# Patient Record
Sex: FEMALE | Race: WHITE | URBAN - METROPOLITAN AREA
[De-identification: names, ages, dates, MRNs, and addresses within clinical notes are randomized per-mention and may not be internally consistent; named-entity substitution may affect disease eponyms.]

---

## 2017-02-01 ENCOUNTER — NURSING HOME VISIT (OUTPATIENT)
Dept: GERIATRICS | Facility: CLINIC | Age: 82
End: 2017-02-01
Payer: COMMERCIAL

## 2017-02-01 VITALS
HEART RATE: 60 BPM | DIASTOLIC BLOOD PRESSURE: 74 MMHG | TEMPERATURE: 98 F | WEIGHT: 209 LBS | SYSTOLIC BLOOD PRESSURE: 118 MMHG | BODY MASS INDEX: 33.75 KG/M2 | RESPIRATION RATE: 18 BRPM

## 2017-02-01 DIAGNOSIS — E89.0 POSTSURGICAL HYPOTHYROIDISM: ICD-10-CM

## 2017-02-01 DIAGNOSIS — M54.42 CHRONIC LOW BACK PAIN WITH BILATERAL SCIATICA, UNSPECIFIED BACK PAIN LATERALITY: ICD-10-CM

## 2017-02-01 DIAGNOSIS — G89.29 CHRONIC LOW BACK PAIN WITH BILATERAL SCIATICA, UNSPECIFIED BACK PAIN LATERALITY: ICD-10-CM

## 2017-02-01 DIAGNOSIS — G62.9 PERIPHERAL POLYNEUROPATHY: Primary | Chronic | ICD-10-CM

## 2017-02-01 DIAGNOSIS — E11.9 TYPE 2 DIABETES MELLITUS WITHOUT COMPLICATION, WITHOUT LONG-TERM CURRENT USE OF INSULIN (H): ICD-10-CM

## 2017-02-01 DIAGNOSIS — N39.0 FREQUENT UTI: ICD-10-CM

## 2017-02-01 DIAGNOSIS — M54.41 CHRONIC LOW BACK PAIN WITH BILATERAL SCIATICA, UNSPECIFIED BACK PAIN LATERALITY: ICD-10-CM

## 2017-02-01 PROCEDURE — 99309 SBSQ NF CARE MODERATE MDM 30: CPT | Performed by: NURSE PRACTITIONER

## 2017-02-01 PROCEDURE — 99207 ZZC CDG-CORRECTLY CODED, REVIEWED AND AGREE: CPT | Performed by: NURSE PRACTITIONER

## 2017-02-01 NOTE — PROGRESS NOTES
[place] [facility]    Chief Complaint   Patient presents with     RECHECK     HPI:    Mireille Taylor is a 87 year old  (8/2/1929), who is being seen today for an episodic care visit at [facility]. Today's concern is:  Peripheral polyneuropathy (H)  Continues with chronic back and bilateral leg pain.  This has affected feeling in both lower extremities.  No recent changes.    Chronic low back pain with bilateral sciatica, unspecified back pain laterality  No recent changes to scheduled pain medications.  Took prn oxycodone 1-2 times per day in January.  Implanted stimulator remains in place.     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  TSH in August was 0.88.  No recent changes to levothyroxine dose.    Type 2 diabetes mellitus without complication, without long-term current use of insulin (H)  Fasting accuchecks in January: 124-139.  Remains on Metformin.    Frequent UTI, Dr Bigg Muhammad, Urologist  Remains on premarin cream twice a week, myrbetriq and hiprex. No UTI for 6 months.      ALLERGIES: Bactrim; Gabapentin; Lyrica; Methadone; Nsaids; Penicillin g; Tramadol; Cephalexin hcl; Clindamycin hcl; and Macrobid  Past Medical, Surgical, Family and Social History reviewed and updated in [facility].    Current Outpatient Prescriptions   Medication Sig Dispense Refill     DiazePAM (VALIUM PO) Take 2.5 mg by mouth At Bedtime       Multiple Vitamins-Minerals (PRESERVISION AREDS 2 PO) Take 1 tablet by mouth 2 times daily       Pseudoephedrine-DM-GG (ROBITUSSIN CF PO) Take 5 mLs by mouth as needed       Acetaminophen (TYLENOL PO) Take 650 mg by mouth 4 times daily       conjugated estrogens (PREMARIN) vaginal cream Place 0.5 g vaginally twice a week 30 g 12     polyethylene glycol (MIRALAX/GLYCOLAX) powder Take 17 g by mouth daily as needed for constipation 119 g      oxyCODONE (ROXICODONE) 5 MG immediate release tablet 2.5 mg am and HS and q6h prn pain.  Check with pt  before administering morning dose if she has had a prn overnight to see if she would like to hold the morning dose.  0     metFORMIN (GLUCOPHAGE) 500 MG tablet Take 1 tablet (500 mg) by mouth 2 times daily (with meals) 180 tablet 1     pantoprazole (PROTONIX) 40 MG enteric coated tablet Take 1 tablet (40 mg) by mouth daily Take 30-60 minutes before a meal. 180 tablet 3     levothyroxine (SYNTHROID, LEVOTHROID) 125 MCG tablet Take 1 tablet (125 mcg) by mouth daily 90 tablet 1     sodium chloride ([employer] SALINE NASAL SPRAY) 0.65 % nasal spray Spray 2 sprays into both nostrils 2 times daily       aspirin 81 MG EC tablet Take 1 tablet (81 mg) by mouth daily 90 tablet 3     Ascorbic Acid 1000 MG TABS Take 1,000 mg by mouth daily       furosemide (LASIX) 20 MG tablet Take 1 tablet (20 mg) by mouth daily 90 tablet 4     citalopram (CELEXA) 40 MG tablet TAKE 1 TABLET BY MOUTH DAILY 90 tablet 1     potassium chloride SA (K-DUR,KLOR-CON M) 10 MEQ tablet TAKE ONE TABLET BY MOUTH DAILY 90 tablet 1     methenamine hippurate (HIPREX) 1 G TABS TAKE 1 TABLET BY MOUTH ONCE A DAY.  TAKE ALONG WITH 1000MG VITAMIN-C 180 tablet 4     mirabegron (MYRBETRIQ) 50 MG 24 hr tablet Take 1 tablet (50 mg) by mouth daily 30 tablet 11     Medications reviewed:  Medications reconciled to facility chart and changes were made to reflect current medications as identified as above med list. Below are the changes that were made:   Medications stopped since last EPIC medication reconciliation:   There are no discontinued medications.    Medications started since last [facility] medication reconciliation:  No orders of the defined types were placed in this encounter.     Patient Active Problem List   Diagnosis     GERD (gastroesophageal reflux disease)     Systolic murmur     CARDIOVASCULAR SCREENING; LDL GOAL LESS THAN 100     JAMES on CPAP     Osteoporosis     BMI 30-35     Peripheral neuropathy (H)     Anxiety     Vitamin D deficiency     Mixed incontinence urge  and stress     Health Care Home     Frequent UTI, Dr Bigg Muhammad, Urologist     Candidiasis of skin     Dependent edema     Benzodiazepine dependence, pt reports daily use of valium since the 1930's.     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     Chronic low back pain with bilateral sciatica     Macular degeneration (senile) of retina     Recurrent major depressive disorder, in partial remission (H)       REVIEW OF SYSTEMS:  Negative selected, CONSTITUTIONAL: weight loss, weight gain, poor appetite and fevers, EYES: tearing, redness and Positive for eyeglasses and macular degeneration.  Followed by Dr. Carlson ENT: [place], dysphagia and positive for h/o epistaxis,  CV: chest pain and Positive for dependent edema., RESPIRATORY: shortness of breath, cough and wheezing, : Positive for frequent UTI's and dribble incontinence. Has implanted stimulator for bladder control...SEE HPI. GI: N&V.  Positive for occasional loose stools and constipation.  See HPI for rectal bleeding.. NEURO: headaches and Positive for variable memory issues - son reports it is dependent on how much valium and oxycodone she has taken & pt agrees.. , and MUSCULOSKELETAL: Positive for chronic back pain.    Physical Exam:  /74 mmHg  Pulse 60  Temp(Src) 98  F (36.7  C)  Resp 18  Wt 209 lb (94.802 kg)  GENERAL APPEARANCE: Alert, overweight female seen.  Sitting up at side of bed.  Well groomed. .  Able to  express self verbally. No obvious acute distress.  HEAD:  Normocephalic.  No facial asymmetry..  ENT: Mouth and posterior oropharynx normal, moist mucous membranes, .  EYES: EOM normal, conjunctiva and lids normal  NECK  Thick neck, trachea midline. No swallow difficulties.  RESP: Quiet, effortless respiration.  No cough.  Diminished lung sounds bilateral lower lobes, otherwise CTA bilaterally.  Old surgical scar from  thyroidectomy visible at base of neck and upper chest..  CV: regular rate and rhythm, 2/6 systolic murmur. Trace/+1 ankle and pedal edema.  ABDOMEN: Large, rounded abdomen. BS's positive all 4 quadrants. Slight discomfort with palpation of right periumbilical area.  PSYCH: oriented X 3, Mood is somewhat flat per baseline, but overall positive.     Recent Labs:    CBC RESULTS:   Recent Labs   Lab Test 12/05/16 09/27/16 09/21/13   1455  06/01/11   1845   WBC  5.2  5.8   < >  10.9   --    RBC  4.23  4.3   < >  4.77   --    HGB  13.1  13.3   < >  14.9  15.0   HCT  40.6  41.2   < >  43.1  49.2   MCV  96.0  95.8   < >  90  94.1   MCH   --    --    --   31.2  28.7   MCHC   --    --    --   34.6  30.5*   RDW  13.3  13.0   < >  12.7  13.7   PLT  175  179   < >  128*   --     < > = values in this interval not displayed.       Last Basic Metabolic Panel:  Recent Labs   Lab Test 12/05/16 09/27/16   NA  142  139   POTASSIUM  4.3  3.8   CHLORIDE  103  100   KURTIS  10.0  10.1   CO2  30  31   BUN  11  <10   CR  0.62  0.72   GLC  110*  203*     GFR ESTIMATE   Date Value Ref Range Status   12/05/2016 >60 ml/min/1.73m2 Final   09/27/2016 >60 >60 ml/min/1.73m2 Final   08/25/2016 >60 ml/min/1.73m2 Final   06/09/2016 >60 ml/min/1.73m2 Final   03/17/2016 >60 ml/min/1.73m2 Final     TSH   Date Value Ref Range Status   08/24/2016 0.88 0.20 - 4.50 mcU/mL Final   ]  A1C      5.7   8/24/2016  A1C      5.8   4/14/2016  A1C      5.9   12/17/2015  A1C      6.2   6/19/2015  A1C      6.4   1/13/2015      Assessment/Plan:  (G62.9) Peripheral polyneuropathy (H)  (primary encounter diagnosis)  Comment: Chronic  Plan: Continue current POC.  Dr. Leon to see on Monday for routine eval.    (M54.41,  G89.29,  M54.42) Chronic low back pain with bilateral sciatica, unspecified back pain laterality  Comment: Chronic, but she feels current medications are adequate for pain control  Plan: Continue current POC    (E89.0) Postsurgical hypothyroidism, Surgical  excision in her 20's.  Comment: Chronic  Plan: Continue current dose of levothyroxine and check TSH tomorrow due to the low trend of TSH 6 months ago.    (E11.9) Type 2 diabetes mellitus without complication, without long-term current use of insulin (H)  Comment: Chronic, HgbA1C goal of <8%. At goal  Plan: Continue current POC.  Check HgbA1C, Hgb and BMP in am.    (N39.0) Frequent UTI, Dr Bigg Muhammad, Urologist  Comment: Chronic, but with recent good control  Plan: Continue current POC.    Electronically signed by  SABINE Borjas CNP

## 2017-02-02 ENCOUNTER — TRANSFERRED RECORDS (OUTPATIENT)
Dept: HEALTH INFORMATION MANAGEMENT | Facility: CLINIC | Age: 82
End: 2017-02-02

## 2017-02-02 LAB
BUN SERPL-MCNC: 11 MG/DL (ref 9–26)
CALCIUM SERPL-MCNC: 9.7 MG/DL (ref 8.4–10.2)
CHLORIDE SERPLBLD-SCNC: 104 MMOL/L (ref 98–109)
CHLORIDE SERPLBLD-SCNC: 104 MMOL/L (ref 98–109)
CO2 SERPL-SCNC: 31 MMOL/L (ref 22–31)
CREAT SERPL-MCNC: 0.81 MG/DL (ref 0.55–1.02)
GFR SERPL CREATININE-BSD FRML MDRD: >60 ML/MIN/1.73M2
GLUCOSE SERPL-MCNC: 118 MG/DL (ref 70–100)
HBA1C MFR BLD: 5.8 % (ref 4–5.6)
HEMOGLOBIN: 13.1 G/DL (ref 11.8–15.5)
POTASSIUM SERPL-SCNC: 4.5 MMOL/L (ref 3.5–5.2)
POTASSIUM SERPL-SCNC: 4.5 MMOL/L (ref 3.5–5.2)
SODIUM SERPL-SCNC: 142 MMOL/L (ref 136–145)
SODIUM SERPL-SCNC: 142 MMOL/L (ref 136–145)
TSH SERPL-ACNC: 0.6 MCU/ML (ref 0.2–4.5)
TSH SERPL-ACNC: 0.6 ULU/ML (ref 0.2–4.5)

## 2017-02-20 ENCOUNTER — NURSING HOME VISIT (OUTPATIENT)
Dept: GERIATRICS | Facility: CLINIC | Age: 82
End: 2017-02-20

## 2017-02-20 DIAGNOSIS — G60.9 IDIOPATHIC PERIPHERAL NEUROPATHY: ICD-10-CM

## 2017-02-20 DIAGNOSIS — M54.41 CHRONIC LOW BACK PAIN WITH BILATERAL SCIATICA, UNSPECIFIED BACK PAIN LATERALITY: Primary | ICD-10-CM

## 2017-02-20 DIAGNOSIS — E11.9 TYPE 2 DIABETES MELLITUS WITHOUT COMPLICATION, WITHOUT LONG-TERM CURRENT USE OF INSULIN (H): ICD-10-CM

## 2017-02-20 DIAGNOSIS — G89.29 CHRONIC LOW BACK PAIN WITH BILATERAL SCIATICA, UNSPECIFIED BACK PAIN LATERALITY: Primary | ICD-10-CM

## 2017-02-20 DIAGNOSIS — M54.42 CHRONIC LOW BACK PAIN WITH BILATERAL SCIATICA, UNSPECIFIED BACK PAIN LATERALITY: Primary | ICD-10-CM

## 2017-02-21 NOTE — PROGRESS NOTES
Pt seen for a regulatory visit  Case reviewed with NP     Pt denies acute physical concerns     She would like to have oxycodone scheduled earlier in am, for low back and LE pain     She denies chest pain, SOB     VSS  In good spirits, appears well  Lungs clear  CV rrr  Abd soft  No LE edema     Assessment     Chronic LBP, stable  Peripheral neuropathy, stable  DM type 2, controlled on metformin  Hx chronic UTI, stable, Pt remains on Hiprex     Plan  Continue current tx  Adjust timing of scheduled am oxycodone.

## 2017-02-23 VITALS
HEIGHT: 66 IN | TEMPERATURE: 98.2 F | DIASTOLIC BLOOD PRESSURE: 66 MMHG | WEIGHT: 212 LBS | SYSTOLIC BLOOD PRESSURE: 129 MMHG | BODY MASS INDEX: 34.07 KG/M2 | RESPIRATION RATE: 20 BRPM | HEART RATE: 63 BPM

## 2017-02-24 ENCOUNTER — NURSING HOME VISIT (OUTPATIENT)
Dept: GERIATRICS | Facility: CLINIC | Age: 82
End: 2017-02-24
Payer: COMMERCIAL

## 2017-02-24 DIAGNOSIS — N39.46 MIXED INCONTINENCE URGE AND STRESS: Primary | Chronic | ICD-10-CM

## 2017-02-24 DIAGNOSIS — N32.89 BLADDER SPASMS: ICD-10-CM

## 2017-02-24 DIAGNOSIS — N39.0 CHRONIC UTI: ICD-10-CM

## 2017-02-24 PROCEDURE — 99309 SBSQ NF CARE MODERATE MDM 30: CPT | Performed by: NURSE PRACTITIONER

## 2017-04-12 ENCOUNTER — NURSING HOME VISIT (OUTPATIENT)
Dept: GERIATRICS | Facility: CLINIC | Age: 82
End: 2017-04-12
Payer: COMMERCIAL

## 2017-04-12 VITALS
WEIGHT: 210 LBS | HEART RATE: 80 BPM | SYSTOLIC BLOOD PRESSURE: 132 MMHG | TEMPERATURE: 98.1 F | BODY MASS INDEX: 33.89 KG/M2 | DIASTOLIC BLOOD PRESSURE: 72 MMHG | RESPIRATION RATE: 18 BRPM

## 2017-04-12 DIAGNOSIS — M54.41 CHRONIC BILATERAL LOW BACK PAIN WITH BILATERAL SCIATICA: ICD-10-CM

## 2017-04-12 DIAGNOSIS — L21.9 SEBORRHEIC DERMATITIS: Primary | ICD-10-CM

## 2017-04-12 DIAGNOSIS — F13.20 BENZODIAZEPINE DEPENDENCE (H): ICD-10-CM

## 2017-04-12 DIAGNOSIS — E89.0 POSTSURGICAL HYPOTHYROIDISM: ICD-10-CM

## 2017-04-12 DIAGNOSIS — G89.29 CHRONIC BILATERAL LOW BACK PAIN WITH BILATERAL SCIATICA: ICD-10-CM

## 2017-04-12 DIAGNOSIS — E11.9 TYPE 2 DIABETES MELLITUS WITHOUT COMPLICATION, WITHOUT LONG-TERM CURRENT USE OF INSULIN (H): ICD-10-CM

## 2017-04-12 DIAGNOSIS — M54.42 CHRONIC BILATERAL LOW BACK PAIN WITH BILATERAL SCIATICA: ICD-10-CM

## 2017-04-12 PROCEDURE — 99309 SBSQ NF CARE MODERATE MDM 30: CPT | Performed by: NURSE PRACTITIONER

## 2017-04-12 RX ORDER — SELENIUM SULFIDE 22.5 MG/ML
1 SHAMPOO TOPICAL
Start: 2017-04-13

## 2017-04-12 NOTE — PROGRESS NOTES
[place] [facility]    Chief Complaint   Patient presents with     alf Regulatory     HPI:    Mireille Taylor is a 87 year old  (8/2/1929), who is being seen today for a federally mandated E/M visit at [facility]. Today's concerns are:  Seborrheic dermatitis  Pt is reporting increasing scaliness and itching of scalp.  Shampoos completed weekly.    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  No recent changes in levothyroxine dose. TSH of .60 in February.    Chronic bilateral low back pain with bilateral sciatica  Long standing pain with h/o opioid dependency.  Currently on low dose oxycodone bid, but has taken 15 prn doses this month.  Also on tylenol qid and has implantable pain device.  She feels that she is controlling her pain well.    Type 2 diabetes mellitus without complication, without long-term current use of insulin (H)  Remains on metformin bid.  HgbA1c on 2/2 was 5.8%    Benzodiazepine dependence, pt reports daily use of valium since the 1930's.  Continues nightly use of valium  Has had 6 prn doses this month.  Comfort is her goal and is not interested in decreasing use.    ALLERGIES: Bactrim [sulfamethoxazole w-trimethoprim]; Gabapentin; Lyrica; Methadone; Nsaids; Penicillin g; Tramadol; Cephalexin hcl; Clindamycin hcl; and Macrobid [nitrofurantoin]  PAST MEDICAL HISTORY:  has a past medical history of Advanced directives, counseling/discussion, POLST completed 8/27/15, DNR/DNI, No tube feedings, No dialysis,  Do not hospitalize unless comfort can be improved.  OK for IV's and antibiotics (8/27/2015); Agoraphobia without mention of panic attacks; Benzodiazepine dependence, pt reports daily use of valium since the 1930's. 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 (1/28/2016); Recurrent major depressive disorder, in partial remission (H) (12/6/2016); Seborrheic dermatitis (4/12/2017); Squamous cell carcinoma of skin of upper limb, including shoulder; Type 2 diabetes mellitus without complication (H) (12/16/2015); Unspecified curvature of spine; Unspecified essential hypertension; and Unspecified vitamin D deficiency.  PAST SURGICAL HISTORY:  has a past surgical history that includes Abdomen surgery (1940); hip surgery (2010); Foot surgery (2000); Hysterectomy vaginal (1980'S); Breast surgery (1970'S); Eye surgery (2003); Abdomen surgery (1990's); Head and neck surgery (1960's); knee surgery (9/2004, 11/2007); implant back (4/7/2009); and Implant stimulator and leads sacral nerve (stage one and two) (2/16/2015 2005).  FAMILY HISTORY: family history includes Family History Negative in her son, son, son, and son; HEART DISEASE in her father and mother.  SOCIAL HISTORY:  reports that she quit smoking about 57 years ago. Her smoking use included Cigarettes. She has a 24.00 pack-year smoking history. She has never used smokeless tobacco. She reports that she uses illicit drugs. She reports that she does not drink alcohol.    MEDICATIONS:  Current Outpatient Prescriptions   Medication Sig Dispense Refill     DiazePAM (VALIUM PO) Take 2.5 mg by mouth At Bedtime       Multiple Vitamins-Minerals (PRESERVISION AREDS 2 PO) Take 1 tablet by mouth 2 times daily       Pseudoephedrine-DM-GG (ROBITUSSIN CF PO) Take 5 mLs by mouth as needed       Acetaminophen (TYLENOL PO) Take 650 mg by mouth 4 times daily       conjugated estrogens (PREMARIN) vaginal cream Place 0.5 g vaginally twice a week 30 g 12     polyethylene glycol (MIRALAX/GLYCOLAX) powder Take 17 g by mouth daily as needed for  constipation 119 g      oxyCODONE (ROXICODONE) 5 MG immediate release tablet 2.5 mg am and HS and q6h prn pain.  Check with pt before administering morning dose if she has had a prn overnight to see if she would like to hold the morning dose.  0     metFORMIN (GLUCOPHAGE) 500 MG tablet Take 1 tablet (500 mg) by mouth 2 times daily (with meals) 180 tablet 1     pantoprazole (PROTONIX) 40 MG enteric coated tablet Take 1 tablet (40 mg) by mouth daily Take 30-60 minutes before a meal. 180 tablet 3     levothyroxine (SYNTHROID, LEVOTHROID) 125 MCG tablet Take 1 tablet (125 mcg) by mouth daily 90 tablet 1     sodium chloride (CVS SALINE NASAL SPRAY) 0.65 % nasal spray Spray 2 sprays into both nostrils 2 times daily       aspirin 81 MG EC tablet Take 1 tablet (81 mg) by mouth daily 90 tablet 3     Ascorbic Acid 1000 MG TABS Take 1,000 mg by mouth daily       furosemide (LASIX) 20 MG tablet Take 1 tablet (20 mg) by mouth daily 90 tablet 4     citalopram (CELEXA) 40 MG tablet TAKE 1 TABLET BY MOUTH DAILY 90 tablet 1     potassium chloride SA (K-DUR,KLOR-CON M) 10 MEQ tablet TAKE ONE TABLET BY MOUTH DAILY 90 tablet 1     methenamine hippurate (HIPREX) 1 G TABS TAKE 1 TABLET BY MOUTH ONCE A DAY.  TAKE ALONG WITH 1000MG VITAMIN-C 180 tablet 4     mirabegron (MYRBETRIQ) 50 MG 24 hr tablet Take 1 tablet (50 mg) by mouth daily 30 tablet 11     Medications reviewed:  Medications reconciled to facility chart and changes were made to reflect current medications as identified as above med list. Below are the changes that were made:   Medications stopped since last EPIC medication reconciliation:   There are no discontinued medications.    Medications started since last [facility] medication reconciliation:  No orders of the defined types were placed in this encounter.    Patient Active Problem List   Diagnosis     GERD (gastroesophageal reflux disease)     Systolic murmur     CARDIOVASCULAR SCREENING; LDL GOAL LESS THAN 100     JAMES on CPAP      Osteoporosis     BMI 30-35     Peripheral neuropathy (H)     Anxiety     Vitamin D deficiency     Mixed incontinence urge and stress     Health Care Home     Frequent UTI, Dr Bigg Muhammad, Urologist     Candidiasis of skin     Dependent edema     Benzodiazepine dependence, pt reports daily use of valium since the 1930's.     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     Chronic low back pain with bilateral sciatica     Macular degeneration (senile) of retina     Recurrent major depressive disorder, in partial remission (H)     Seborrheic dermatitis       Case Management:  I have reviewed the care plan and MDS and do agree with the plan. Patient's desire to return to the community is present, but is not able due to care needs .  Information reviewed:  Medications, vital signs, orders, and nursing notes.    ROS:  Negative selected, CONSTITUTIONAL: weight loss, weight gain, poor appetite and fevers, EYES: tearing, redness and Positive for eyeglasses and macular degeneration.  Followed by Dr. Carlson ENT: [place], dysphagia and positive for h/o epistaxis,  CV: chest pain and Positive for dependent edema., RESPIRATORY: shortness of breath, cough and wheezing, : Positive for frequent UTI's and dribble incontinence. Has implanted stimulator for bladder control...SEE HPI. GI: N&V.  Positive for occasional loose stools and constipation.  See HPI for rectal bleeding.. NEURO: headaches and Positive for variable memory issues - son reports it is dependent on how much valium and oxycodone she has taken & pt agrees.. , and MUSCULOSKELETAL: Positive for chronic back pain.    Exam:  /72  Pulse 80  Temp 98.1  F (36.7  C)  Resp 18  Wt 210 lb (95.3 kg)  BMI 33.89 kg/m2  GENERAL APPEARANCE: Alert, overweight female seen.  Sitting up in w/c.  Well groomed. .  Able to  express self verbally. No obvious acute  distress.  HEAD:  Normocephalic.  No facial asymmetry..  ENT: Mouth and posterior oropharynx normal, moist mucous membranes, .  EYES: EOM normal, conjunctiva and lids normal  RESP: Quiet, effortless respiration.  No cough.  Diminished lung sounds bilateral lower lobes, otherwise CTA bilaterally.  Old surgical scar from thyroidectomy visible at base of neck and upper chest..  CV: regular rate and rhythm, 2/6 systolic murmur. Trace/+1 ankle and pedal edema.  MUSC:  Up in electric scooter.  Slight pain with palpation of lumbar spine and with ROM of knees.  ABDOMEN: Large, rounded abdomen. BS's positive all 4 quadrants. No discomfort with palpation of abdomen including suprapubic area.   SKIN:  Slightly scaly scalp.  No wounds.  No pedal wounds..  PSYCH: oriented X 3, Mood is somewhat flat per baseline, but overall positive.     Lab/Diagnostic data:    2/2/17:  Hgb: 13.1.    Last Basic Metabolic Panel:  Recent Labs   Lab Test 02/02/17 12/05/16 09/27/16   NA  142  142  142  139   POTASSIUM  4.5  4.5  4.3  3.8   CHLORIDE  104  104  103  100   KURTIS   --   10.0  10.1   CO2  31  30  31   BUN   --   11  <10   CR   --   0.62  0.72   GLC   --   110*  203*     GFR Estimate   Date Value Ref Range Status   12/05/2016 >60 ml/min/1.73m2 Final   09/27/2016 >60 >60 ml/min/1.73m2 Final   08/25/2016 >60 ml/min/1.73m2 Final     GFR Estimate If Black   Date Value Ref Range Status   12/05/2016 >60 ml/min/1.73m2 Final   08/25/2016 >60 ml/min/1.73m2 Final   06/09/2016 >60 ml/min/1.73m2 Final       TSH   Date Value Ref Range Status   02/02/2017 0.60 0.20 - 4.50 mcU/mL Final   02/02/2017 0.60 0.20 - 4.50 ulU/ml Final     Lab Results   Component Value Date    A1C 5.7 08/24/2016    A1C 5.8 04/14/2016    A1C 5.9 12/17/2015    A1C 6.2 06/19/2015    A1C 6.4 01/13/2015     2/2/17:  HgbA1C: 5.8%    ASSESSMENT/PLAN  (L21.9) Seborrheic dermatitis  (primary encounter diagnosis)  Comment: Acute  Plan: Selenium sulfide shampoo twice weekly.  Monitor for  improvement    (E89.0) Postsurgical hypothyroidism, Surgical excision in her 20's.  Comment: Controlled with levothyroxine  Plan: Continue current dose of levothyroxine and check TSH annually,    (M54.42,  M54.41,  G89.29) Chronic bilateral low back pain with bilateral sciatica  Comment: Chronic, opioid dependent  Plan: Continue current POC.  Pt is not willing to attempt further decrease of narcotics.    (E11.9) Type 2 diabetes mellitus without complication, without long-term current use of insulin (H)  Comment: Chronic, but controlled.  HgbA1C goal of <8%, at goal  Plan: Continue current POC.    (F13.20) Benzodiazepine dependence, pt reports daily use of valium since the 1930's.  Comment: Chronic  Plan: Continue current low dose of valium.  Monitor.    Electronically signed by:  SABINE Borjas CNP

## 2017-04-17 ENCOUNTER — TRANSFERRED RECORDS (OUTPATIENT)
Dept: HEALTH INFORMATION MANAGEMENT | Facility: CLINIC | Age: 82
End: 2017-04-17

## 2017-04-17 LAB
BUN SERPL-MCNC: 14 MG/DL (ref 9–26)
CALCIUM SERPL-MCNC: 9.5 MG/DL (ref 8.4–10.2)
CHLORIDE SERPLBLD-SCNC: 104 MMOL/L (ref 98–109)
CO2 SERPL-SCNC: 29 MMOL/L (ref 22–31)
CREAT SERPL-MCNC: 0.61 MG/DL (ref 0.55–1.02)
GFR SERPL CREATININE-BSD FRML MDRD: >60 ML/MIN/1.73M2
GLUCOSE SERPL-MCNC: 108 MG/DL (ref 70–100)
HEMOGLOBIN: 13.9 G/DL (ref 11.8–15.5)
POTASSIUM SERPL-SCNC: 3.9 MMOL/L (ref 3.5–5.2)
SODIUM SERPL-SCNC: 141 MMOL/L (ref 136–145)

## 2017-06-01 VITALS
TEMPERATURE: 96.3 F | HEIGHT: 66 IN | HEART RATE: 65 BPM | DIASTOLIC BLOOD PRESSURE: 60 MMHG | RESPIRATION RATE: 18 BRPM | SYSTOLIC BLOOD PRESSURE: 129 MMHG | BODY MASS INDEX: 34.39 KG/M2 | WEIGHT: 214 LBS

## 2017-06-01 NOTE — PROGRESS NOTES
[facility]    Chief Complaint   Patient presents with     RECHECK       HPI:    Mireille Taylor is a 87 year old  (8/2/1929), who is being seen today for an episodic care visit at [facility].  HPI information obtained from: facility chart records, facility staff, patient report and [facility] chart review.Today's concern is:  Frequent UTI, Dr Bigg Muhammad, Urologist  Remains on Hiprex and premarin cream.  She continues with chronic bladder and urethral pain with and without urination.  This occurs independent of infection.  Afebrile.    Vaginal burning  Premarin cream has been effective, but no long lasting.  Using it twice weekly.    Type 2 diabetes mellitus without complication, without long-term current use of insulin (H)  HgbA1C in February was 5.8%  Remains on metformin.    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  No recent changes to levothyroxine dose.  TSH on 2/2/17 was 0.60.    Chronic bilateral low back pain with bilateral sciatica  Remains on oxycodone and valium  Using prn valium and oxycodone most days.      ALLERGIES: Bactrim [sulfamethoxazole w-trimethoprim]; Gabapentin; Lyrica; Methadone; Nsaids; Penicillin g; Tramadol; Cephalexin hcl; Clindamycin hcl; and Macrobid [nitrofurantoin]  Past Medical, Surgical, Family and Social History reviewed and updated in [facility].    Current Outpatient Prescriptions   Medication Sig Dispense Refill     VITAMIN D, CHOLECALCIFEROL, PO Take 2,000 Units by mouth daily       Selenium Sulfide 2.25 % SHAM Externally apply 1 Application topically twice a week       DiazePAM (VALIUM PO) Take 2.5 mg by mouth At Bedtime       Multiple Vitamins-Minerals (PRESERVISION AREDS 2 PO) Take 1 tablet by mouth 2 times daily       Pseudoephedrine-DM-GG (ROBITUSSIN CF PO) Take 5 mLs by mouth as needed       Acetaminophen (TYLENOL PO) Take 650 mg by mouth 4 times daily       conjugated estrogens (PREMARIN) vaginal cream Place  0.5 g vaginally twice a week 30 g 12     polyethylene glycol (MIRALAX/GLYCOLAX) powder Take 17 g by mouth daily as needed for constipation 119 g      oxyCODONE (ROXICODONE) 5 MG immediate release tablet 2.5 mg am and HS and q6h prn pain.  Check with pt before administering morning dose if she has had a prn overnight to see if she would like to hold the morning dose.  0     metFORMIN (GLUCOPHAGE) 500 MG tablet Take 1 tablet (500 mg) by mouth 2 times daily (with meals) 180 tablet 1     pantoprazole (PROTONIX) 40 MG enteric coated tablet Take 1 tablet (40 mg) by mouth daily Take 30-60 minutes before a meal. 180 tablet 3     levothyroxine (SYNTHROID, LEVOTHROID) 125 MCG tablet Take 1 tablet (125 mcg) by mouth daily 90 tablet 1     sodium chloride ([employer]S SALINE NASAL SPRAY) 0.65 % nasal spray Spray 2 sprays into both nostrils 2 times daily       aspirin 81 MG EC tablet Take 1 tablet (81 mg) by mouth daily 90 tablet 3     Ascorbic Acid 1000 MG TABS Take 1,000 mg by mouth daily       furosemide (LASIX) 20 MG tablet Take 1 tablet (20 mg) by mouth daily 90 tablet 4     citalopram (CELEXA) 40 MG tablet TAKE 1 TABLET BY MOUTH DAILY 90 tablet 1     potassium chloride SA (K-DUR,KLOR-CON M) 10 MEQ tablet TAKE ONE TABLET BY MOUTH DAILY 90 tablet 1     methenamine hippurate (HIPREX) 1 G TABS TAKE 1 TABLET BY MOUTH ONCE A DAY.  TAKE ALONG WITH 1000MG VITAMIN-C 180 tablet 4     mirabegron (MYRBETRIQ) 50 MG 24 hr tablet Take 1 tablet (50 mg) by mouth daily 30 tablet 11     Medications reviewed:  Medications reconciled to facility chart and changes were made to reflect current medications as identified as above med list. Below are the changes that were made:   Medications stopped since last EPIC medication reconciliation:   There are no discontinued medications.    Medications started since last [facility] medication reconciliation:  Orders Placed This Encounter   Medications     VITAMIN D, CHOLECALCIFEROL, PO     Sig: Take 2,000 Units by  "mouth daily     Patient Active Problem List   Diagnosis     GERD (gastroesophageal reflux disease)     Systolic murmur     CARDIOVASCULAR SCREENING; LDL GOAL LESS THAN 100     JAMES on CPAP     Osteoporosis     BMI 30-35     Peripheral neuropathy (H)     Anxiety     Vitamin D deficiency     Mixed incontinence urge and stress     Health Care Home     Frequent UTI, Dr Bigg Muhammad, Urologist     Candidiasis of skin     Dependent edema     Benzodiazepine dependence, pt reports daily use of valium since the 1930's.     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     Chronic low back pain with bilateral sciatica     Macular degeneration (senile) of retina     Recurrent major depressive disorder, in partial remission (H)     Seborrheic dermatitis       REVIEW OF SYSTEMS:  Negative selected, CONSTITUTIONAL: weight loss, weight gain, poor appetite and fevers, EYES: tearing, redness and Positive for eyeglasses and macular degeneration.  Followed by Dr. Carlson ENT: [place], dysphagia and positive for h/o epistaxis,  CV: chest pain and Positive for dependent edema., RESPIRATORY: shortness of breath, cough and wheezing, : Positive for frequent UTI's and dribble incontinence. Has implanted stimulator for bladder control...SEE HPI. GI: N&V.  Positive for occasional loose stools and constipation.  See HPI for rectal bleeding.. NEURO: headaches and Positive for variable memory issues - son reports it is dependent on how much valium and oxycodone she has taken & pt agrees.. , and MUSCULOSKELETAL: Positive for chronic back pain.    Physical Exam:  /60  Pulse 65  Temp 96.3  F (35.7  C)  Resp 18  Ht 5' 6\" (1.676 m)  Wt 214 lb (97.1 kg)  BMI 34.54 kg/m2  GENERAL APPEARANCE: Alert, overweight female seen.  Resting in bed late morning.  Sleeping soundly, but easily aroused.  Well groomed. .  Able to  express self " verbally. No obvious acute distress.  HEAD:  Normocephalic.  No facial asymmetry..  ENT: Mouth and posterior oropharynx normal, moist mucous membranes, .  EYES: EOM normal, conjunctiva and lids normal  RESP: Quiet, effortless respiration.  No cough.  Diminished lung sounds bilateral lower lobes, otherwise CTA bilaterally.  Old surgical scar from thyroidectomy visible at base of neck and upper chest..  CV: regular rate and rhythm, 2/6 systolic murmur. Trace/+1 ankle and pedal edema.  MUSC: Slight pain with palpation of lumbar spine and with ROM of knees.  ABDOMEN: Large, rounded abdomen. BS's positive all 4 quadrants. No discomfort with palpation of abdomen including suprapubic area.   PSYCH: oriented X 3, Mood is somewhat flat per baseline, but overall positive.     Recent Labs:    CBC RESULTS:   Recent Labs   Lab Test 04/17/17 02/02/17 12/05/16 09/27/16 09/21/13   1455  06/01/11   1845   WBC   --    --   5.2  5.8   < >  10.9   --    RBC   --    --   4.23  4.3   < >  4.77   --    HGB  13.9  13.1  13.1  13.3   < >  14.9  15.0   HCT   --    --   40.6  41.2   < >  43.1  49.2   MCV   --    --   96.0  95.8   < >  90  94.1   MCH   --    --    --    --    --   31.2  28.7   MCHC   --    --    --    --    --   34.6  30.5*   RDW   --    --   13.3  13.0   < >  12.7  13.7   PLT   --    --   175  179   < >  128*   --     < > = values in this interval not displayed.       Last Basic Metabolic Panel:  Recent Labs   Lab Test 04/17/17 02/02/17   NA  141  142  142   POTASSIUM  3.9  4.5  4.5   CHLORIDE  104  104  104   KURTIS  9.5  9.7   CO2  29  31   BUN  14  11   CR  0.61  0.81   GLC  108*  118*     GFR Estimate   Date Value Ref Range Status   04/17/2017 >60 >60 mL/min/1.73m2 Final   02/02/2017 >60 >60 ml/min/1.73m2 Final   12/05/2016 >60 ml/min/1.73m2 Final   09/27/2016 >60 >60 ml/min/1.73m2 Final   08/25/2016 >60 ml/min/1.73m2 Final       Liver Function Studies -   Recent Labs   Lab Test  05/23/14   1510  12/13/13   1303    PROTTOTAL  7.0  7.0   ALBUMIN  4.3  3.8   BILITOTAL  0.9  1.0   ALKPHOS  79  85   AST  24  35   ALT  39  46     TSH   Date Value Ref Range Status   02/02/2017 0.60 0.20 - 4.50 mcU/mL Final   02/02/2017 0.60 0.20 - 4.50 ulU/ml Final   ]    Lab Results   Component Value Date    A1C 5.8 02/02/2017    A1C 5.7 08/24/2016    A1C 5.8 04/14/2016    A1C 5.9 12/17/2015    A1C 6.2 06/19/2015     Assessment/Plan:  (N39.0) Frequent UTI, Dr Bigg Muhammad, Urologist  (primary encounter diagnosis)  Comment: Ongoing bladder discomfort  Plan: Dr. Leon to see on 6/12.  Pt will discuss with him at that time.    (N94.9) Vaginal burning  Comment: Improved with premarin cream, but is not long lasting  Plan: Discuss with Dr. Leon.    (E11.9) Type 2 diabetes mellitus without complication, without long-term current use of insulin (H)  Comment: Chronic, HgbA1C goal: <8%, at goal.  Plan: Continue current POC.    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  Comment: Chronic, controlled by levothyroxine  Plan: Continue current POC.  TSH annually.    (M54.42,  M54.41,  G89.29) Chronic bilateral low back pain with bilateral sciatica  Comment: Chronic  Plan: Continue current pain management.  Has known dependency to narcotics and benzodiazepine    Electronically signed by  SABINE Borjas CNP

## 2017-06-02 ENCOUNTER — NURSING HOME VISIT (OUTPATIENT)
Dept: GERIATRICS | Facility: CLINIC | Age: 82
End: 2017-06-02
Payer: COMMERCIAL

## 2017-06-02 DIAGNOSIS — M54.42 CHRONIC BILATERAL LOW BACK PAIN WITH BILATERAL SCIATICA: ICD-10-CM

## 2017-06-02 DIAGNOSIS — N39.0 FREQUENT UTI: Primary | ICD-10-CM

## 2017-06-02 DIAGNOSIS — E89.0 POSTSURGICAL HYPOTHYROIDISM: ICD-10-CM

## 2017-06-02 DIAGNOSIS — E11.9 TYPE 2 DIABETES MELLITUS WITHOUT COMPLICATION, WITHOUT LONG-TERM CURRENT USE OF INSULIN (H): ICD-10-CM

## 2017-06-02 DIAGNOSIS — M54.41 CHRONIC BILATERAL LOW BACK PAIN WITH BILATERAL SCIATICA: ICD-10-CM

## 2017-06-02 DIAGNOSIS — N94.89 VAGINAL BURNING: ICD-10-CM

## 2017-06-02 DIAGNOSIS — G89.29 CHRONIC BILATERAL LOW BACK PAIN WITH BILATERAL SCIATICA: ICD-10-CM

## 2017-06-02 PROCEDURE — 99309 SBSQ NF CARE MODERATE MDM 30: CPT | Performed by: NURSE PRACTITIONER

## 2017-06-07 ENCOUNTER — NURSING HOME VISIT (OUTPATIENT)
Dept: GERIATRICS | Facility: CLINIC | Age: 82
End: 2017-06-07
Payer: COMMERCIAL

## 2017-06-07 ENCOUNTER — TRANSFERRED RECORDS (OUTPATIENT)
Dept: HEALTH INFORMATION MANAGEMENT | Facility: CLINIC | Age: 82
End: 2017-06-07

## 2017-06-07 VITALS
HEIGHT: 66 IN | BODY MASS INDEX: 34.72 KG/M2 | TEMPERATURE: 98.6 F | DIASTOLIC BLOOD PRESSURE: 80 MMHG | RESPIRATION RATE: 20 BRPM | WEIGHT: 216 LBS | SYSTOLIC BLOOD PRESSURE: 119 MMHG | HEART RATE: 66 BPM

## 2017-06-07 DIAGNOSIS — R30.0 DYSURIA: Primary | ICD-10-CM

## 2017-06-07 DIAGNOSIS — N95.2 ATROPHIC VAGINITIS: ICD-10-CM

## 2017-06-07 DIAGNOSIS — N39.0 FREQUENT UTI: ICD-10-CM

## 2017-06-07 PROCEDURE — 99309 SBSQ NF CARE MODERATE MDM 30: CPT | Performed by: NURSE PRACTITIONER

## 2017-06-07 RX ORDER — PYRIDOXINE HCL (VITAMIN B6) 100 MG
1 TABLET ORAL 2 TIMES DAILY
Qty: 90 CAPSULE
Start: 2017-06-07

## 2017-06-07 NOTE — PROGRESS NOTES
[facility]    Chief Complaint   Patient presents with     RECHECK     HPI:    Mireille Taylor is a 87 year old  (8/2/1929), who is being seen today for an episodic care visit at [facility].  HPI information obtained from: facility chart records, facility staff, patient report and [facility] chart review.Today's concern is:  Dysuria  Pt has chronic dysuria, but today she reports that in the past two days the dysuria has been more intense and she has experienced chills.  Afebrile at this time and she reports no signs of obvious blood.    Atrophic vaginitis  Chronic vaginal irritation.  Receives premarin cream twice a week, which she reports is helpful, but only for a short period of time.  Unclear if the vaginal burning correlates with increase in burning with urination.    Frequent UTI, Dr Bigg Muhammad, Urologist  Has implanted device to help with her pain due to her bladder.  Continues with oxycodone as she reports that this is the only thing that eases the pain at times.  Has not seen Dr. Muhammad for several months.  Remains on mybretriq and Hipprex.    ALLERGIES: Bactrim [sulfamethoxazole w-trimethoprim]; Gabapentin; Lyrica; Methadone; Nsaids; Penicillin g; Tramadol; Cephalexin hcl; Clindamycin hcl; and Macrobid [nitrofurantoin]  Past Medical, Surgical, Family and Social History reviewed and updated in [facility].    Current Outpatient Prescriptions   Medication Sig Dispense Refill     VITAMIN D, CHOLECALCIFEROL, PO Take 2,000 Units by mouth daily       Selenium Sulfide 2.25 % SHAM Externally apply 1 Application topically twice a week       DiazePAM (VALIUM PO) Take 2.5 mg by mouth At Bedtime       Multiple Vitamins-Minerals (PRESERVISION AREDS 2 PO) Take 1 tablet by mouth 2 times daily       Pseudoephedrine-DM-GG (ROBITUSSIN CF PO) Take 5 mLs by mouth as needed       Acetaminophen (TYLENOL PO) Take 650 mg by mouth 4 times daily       conjugated estrogens (PREMARIN)  vaginal cream Place 0.5 g vaginally twice a week 30 g 12     polyethylene glycol (MIRALAX/GLYCOLAX) powder Take 17 g by mouth daily as needed for constipation 119 g      oxyCODONE (ROXICODONE) 5 MG immediate release tablet 2.5 mg am and HS and q6h prn pain.  Check with pt before administering morning dose if she has had a prn overnight to see if she would like to hold the morning dose.  0     metFORMIN (GLUCOPHAGE) 500 MG tablet Take 1 tablet (500 mg) by mouth 2 times daily (with meals) 180 tablet 1     pantoprazole (PROTONIX) 40 MG enteric coated tablet Take 1 tablet (40 mg) by mouth daily Take 30-60 minutes before a meal. 180 tablet 3     levothyroxine (SYNTHROID, LEVOTHROID) 125 MCG tablet Take 1 tablet (125 mcg) by mouth daily 90 tablet 1     sodium chloride (CVS SALINE NASAL SPRAY) 0.65 % nasal spray Spray 2 sprays into both nostrils 2 times daily       aspirin 81 MG EC tablet Take 1 tablet (81 mg) by mouth daily 90 tablet 3     Ascorbic Acid 1000 MG TABS Take 1,000 mg by mouth daily       furosemide (LASIX) 20 MG tablet Take 1 tablet (20 mg) by mouth daily 90 tablet 4     citalopram (CELEXA) 40 MG tablet TAKE 1 TABLET BY MOUTH DAILY 90 tablet 1     potassium chloride SA (K-DUR,KLOR-CON M) 10 MEQ tablet TAKE ONE TABLET BY MOUTH DAILY 90 tablet 1     methenamine hippurate (HIPREX) 1 G TABS TAKE 1 TABLET BY MOUTH ONCE A DAY.  TAKE ALONG WITH 1000MG VITAMIN-C 180 tablet 4     mirabegron (MYRBETRIQ) 50 MG 24 hr tablet Take 1 tablet (50 mg) by mouth daily 30 tablet 11     Medications reviewed:  Medications reconciled to facility chart and changes were made to reflect current medications as identified as above med list. Below are the changes that were made:   Medications stopped since last EPIC medication reconciliation:   There are no discontinued medications.    Medications started since last [facility] medication reconciliation:  No orders of the defined types were placed in this encounter.    Patient Active Problem  "List   Diagnosis     GERD (gastroesophageal reflux disease)     Systolic murmur     CARDIOVASCULAR SCREENING; LDL GOAL LESS THAN 100     JAMES on CPAP     Osteoporosis     BMI 30-35     Peripheral neuropathy (H)     Anxiety     Vitamin D deficiency     Mixed incontinence urge and stress     Health Care Home     Frequent UTI, Dr Bigg Muhammad, Urologist     Candidiasis of skin     Dependent edema     Benzodiazepine dependence, pt reports daily use of valium since the 1930's.     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     Chronic low back pain with bilateral sciatica     Macular degeneration (senile) of retina     Recurrent major depressive disorder, in partial remission (H)     Seborrheic dermatitis       REVIEW OF SYSTEMS:  egative selected, CONSTITUTIONAL: weight loss, weight gain, poor appetite and fevers, EYES: tearing, redness and Positive for eyeglasses and macular degeneration.  Followed by Dr. Carlson ENT: [place], dysphagia and positive for h/o epistaxis,  CV: chest pain and Positive for dependent edema., RESPIRATORY: shortness of breath, cough and wheezing, : Positive for frequent UTI's and dribble incontinence. Has implanted stimulator for bladder control...SEE HPI. GI: N&V.  Positive for occasional loose stools and constipation.  See HPI for rectal bleeding.. NEURO: headaches and Positive for variable memory issues - son reports it is dependent on how much valium and oxycodone she has taken & pt agrees.. , and MUSCULOSKELETAL: Positive for chronic back pain.      Physical Exam:  /80  Pulse 66  Temp 98.6  F (37  C)  Resp 20  Ht 5' 6\" (1.676 m)  Wt 216 lb (98 kg)  BMI 34.86 kg/m2  GENERAL APPEARANCE: Alert, overweight female seen.  Up in w/c preparing for guests.  Well groomed. .  Able to  express self verbally. No obvious acute distress.  HEAD:  Normocephalic.  No facial " asymmetry..  ENT: Mouth and posterior oropharynx normal, moist mucous membranes, .  EYES: EOM normal, conjunctiva and lids normal  RESP: Quiet, effortless respiration.  No cough.  Diminished lung sounds bilateral lower lobes, otherwise CTA bilaterally.  Old surgical scar from thyroidectomy visible at base of neck and upper chest..  CV: regular rate and rhythm, 2/6 systolic murmur. Trace/+1 ankle and pedal edema.  MUSC: Slight pain with palpation of lumbar spine and with ROM of knees.  ABDOMEN: Large, rounded abdomen. BS's positive all 4 quadrants. No discomfort with palpation of abdomen including suprapubic area. Unable to assess perineal area as pt was up in chair, but in the past her exam has been negative with same complaints..  PSYCH: oriented X 3, Mood is somewhat flat per baseline, but overall positive.     Recent Labs:    CBC RESULTS:   Recent Labs   Lab Test 04/17/17 02/02/17 12/05/16 09/27/16 09/21/13   1455  06/01/11   1845   WBC   --    --   5.2  5.8   < >  10.9   --    RBC   --    --   4.23  4.3   < >  4.77   --    HGB  13.9  13.1  13.1  13.3   < >  14.9  15.0   HCT   --    --   40.6  41.2   < >  43.1  49.2   MCV   --    --   96.0  95.8   < >  90  94.1   MCH   --    --    --    --    --   31.2  28.7   MCHC   --    --    --    --    --   34.6  30.5*   RDW   --    --   13.3  13.0   < >  12.7  13.7   PLT   --    --   175  179   < >  128*   --     < > = values in this interval not displayed.       Last Basic Metabolic Panel:  Recent Labs   Lab Test 04/17/17 02/02/17   NA  141  142  142   POTASSIUM  3.9  4.5  4.5   CHLORIDE  104  104  104   KURTIS  9.5  9.7   CO2  29  31   BUN  14  11   CR  0.61  0.81   GLC  108*  118*     GFR Estimate   Date Value Ref Range Status   04/17/2017 >60 >60 mL/min/1.73m2 Final   02/02/2017 >60 >60 ml/min/1.73m2 Final   12/05/2016 >60 ml/min/1.73m2 Final   09/27/2016 >60 >60 ml/min/1.73m2 Final   08/25/2016 >60 ml/min/1.73m2 Final     Assessment/Plan:  (R30.0) Dysuria  (primary  encounter diagnosis)  Comment: Chronic with recent worsening of symptoms.  Plan: Obtain urine specimen by catheterization for UA/UC.  Start cranberry tablets.  Dr. Leon to see MOnday.  CBC and BMP in morning.    (N95.2) Atrophic vaginitis  Comment: Chronic  Plan: Continue premarin cream. Discuss with Dr. Leon on Monday morning.    (N39.0) Frequent UTI, Dr Bigg Muhammad, Urologist  Comment: Ongoing  Plan: Same as number one.  Continue Hipprex and myrbetriq per Urology plan.  If symptoms persist, pt will need to see Urology again.    Electronically signed by  SABINE Borjas CNP

## 2017-06-13 ENCOUNTER — NURSING HOME VISIT (OUTPATIENT)
Dept: GERIATRICS | Facility: CLINIC | Age: 82
End: 2017-06-13

## 2017-06-13 DIAGNOSIS — R39.9 GENITOURINARY SYMPTOMS: Primary | ICD-10-CM

## 2017-06-13 DIAGNOSIS — M54.41 CHRONIC BILATERAL LOW BACK PAIN WITH BILATERAL SCIATICA: ICD-10-CM

## 2017-06-13 DIAGNOSIS — R30.0 DYSURIA: Primary | ICD-10-CM

## 2017-06-13 DIAGNOSIS — G89.29 CHRONIC BILATERAL LOW BACK PAIN WITH BILATERAL SCIATICA: ICD-10-CM

## 2017-06-13 DIAGNOSIS — M54.42 CHRONIC BILATERAL LOW BACK PAIN WITH BILATERAL SCIATICA: ICD-10-CM

## 2017-06-13 DIAGNOSIS — N95.2 ATROPHIC VAGINITIS: ICD-10-CM

## 2017-06-13 RX ORDER — ESTRADIOL 0.1 MG/G
2 CREAM VAGINAL
Qty: 42.5 G
Start: 2017-06-14 | End: 2019-01-01

## 2017-06-13 NOTE — PROGRESS NOTES
Premarin cream not covered by insurance.  Pharmacy has recommended Estrace vaginal cream as substitute and reports that it is covered.

## 2017-06-14 NOTE — PROGRESS NOTES
Pt was seen for a regulatory LTC visit  Case reviewed with NP    Pt has recently experienced recurrence of dysuria, was found to have a + UC E coli.  Is now in the midst of treatment of Cipro. She continues to receive premarin cream 2 X weekly, with minimal benefit    Pt notes improvement in dysuria since cipro started  She denies fevers, chills  Chronic LBP is stable    Exam  In NAD  Lungs clear  CV rrr  Abd soft, non-distended, non-tender  No CVA tenderness      Assessment    Hx of chronic dysuria, currently on cipro for + UC, on cipro (and chronic premarin cream), with some apparent improvement.  Suspect symptoms are secondary to UTI and vaginitis    Plan  Complete course of cipro  Consider increase in frequency of premarin cream  Urology f/u

## 2017-06-28 ENCOUNTER — TELEPHONE (OUTPATIENT)
Dept: GERIATRICS | Facility: CLINIC | Age: 82
End: 2017-06-28

## 2017-06-29 ENCOUNTER — TELEPHONE (OUTPATIENT)
Dept: GERIATRICS | Facility: CLINIC | Age: 82
End: 2017-06-29

## 2017-06-29 NOTE — TELEPHONE ENCOUNTER
Called about patient's diazepam, which she has run out of tonight.   PLAN: talked with pharmacist.  Okay for emergency supply #10.   Follow up with primary team.   Cata Whyte MD

## 2017-06-29 NOTE — TELEPHONE ENCOUNTER
TELEPHONE ENCOUNTER:      Mireille Taylor is a 87 year old  (8/2/1929),Nurse called today to report: patient nearly out of her Diazepam,  In need of hard copy    ASSESSMENT/PLAN  Controlled substance reorder - script sent to [facility] - NB for 60 tabs and 3 refills    SABINE Garza CNP

## 2017-07-14 ENCOUNTER — TRANSFERRED RECORDS (OUTPATIENT)
Dept: HEALTH INFORMATION MANAGEMENT | Facility: CLINIC | Age: 82
End: 2017-07-14

## 2017-07-14 LAB
BUN SERPL-MCNC: 16 MG/DL (ref 9–26)
CALCIUM SERPL-MCNC: 9.9 MG/DL (ref 8.4–10.2)
CHLORIDE SERPLBLD-SCNC: 101 MMOL/L (ref 98–109)
CO2 SERPL-SCNC: 29 MMOL/L (ref 22–31)
CREAT SERPL-MCNC: 0.74 MG/DL (ref 0.55–1.02)
GFR SERPL CREATININE-BSD FRML MDRD: >60 ML/MIN/1.73M2
GLUCOSE SERPL-MCNC: 108 MG/DL (ref 70–100)
HEMOGLOBIN: 13.5 G/DL (ref 11.8–15.5)
POTASSIUM SERPL-SCNC: 4.5 MMOL/L (ref 3.5–5.2)
SODIUM SERPL-SCNC: 138 MMOL/L (ref 136–145)

## 2017-08-17 VITALS
DIASTOLIC BLOOD PRESSURE: 70 MMHG | BODY MASS INDEX: 35.03 KG/M2 | WEIGHT: 218 LBS | SYSTOLIC BLOOD PRESSURE: 128 MMHG | HEIGHT: 66 IN | RESPIRATION RATE: 22 BRPM | TEMPERATURE: 97.2 F | HEART RATE: 64 BPM

## 2017-08-17 RX ORDER — CHLORHEXIDINE GLUCONATE ORAL RINSE 1.2 MG/ML
SOLUTION DENTAL 2 TIMES DAILY
COMMUNITY
End: 2019-01-01

## 2017-08-17 ASSESSMENT — PATIENT HEALTH QUESTIONNAIRE - PHQ9: SUM OF ALL RESPONSES TO PHQ QUESTIONS 1-9: 4

## 2017-08-17 NOTE — PROGRESS NOTES
"[facility]  Chief Complaint   Patient presents with     Annual Comprehensive Nursing Home     HPI:    Mireille Taylor is a 88 year old  (8/2/1929), who is being seen today for an annual comprehensive visit at [facility].  HPI information obtained from: facility chart records, facility staff, patient report and [facility] chart review.  Today's concerns are:  Type 2 diabetes mellitus without complication, without long-term current use of insulin (H)  No recent change to metformin.  Accuchecks this month (fasting) 141-148.  HgbA1C in February was 5.8    Chronic bilateral low back pain with bilateral sciatica  Continues on oxycodone and valium, which she has used from many years.   Us of low dose oxycodone 18 times this month and well as scheduled bid. Also on tylenol qid.  Has implanted estim device for many years.  Relies on electric w/c for all distance movement..     Recurrent major depressive disorder, in partial remission (H)  Remains on citalopram daily.  PHQ 9 score of 4. Discussed today with patient the possibility of conversion from citalopram to cymbalta for control of both her depression and chronic pain.  At this time, she feels she is handling her situation well and does not want any med changes.    Morbid obesity, unspecified obesity type (H)  BMI of 35, but affiliated with multiple comorbidities, does meet criteria for morbid obesity.  Sedentary due to her spinal stenosis and w/c dependent.  Had been utilizing the therapeutic pool at the facility, but no longer is, as she \"feels lazy\".    Slow transit constipation  BM's qd-qod    Peripheral polyneuropathy (H)  Chronic LE pain.  Does have allergy to gabapentin and lyrica.    Benzodiazepine dependence, pt reports daily use of valium since the 1930's.  Ongoing daily use of valium at HS and has had 5 prn doses this month.  Reports that her back pain is too severe without it.    Frequent UTI, Dr Bigg Muhammad, " Urologist  Treated for UTI in June with Cipro for Ecoli.  Remains on Hipprex, vitamin C and myrbetriq per Urology. Plans to see Urology following the current appts with opthalmology and dental.    JAMES on CPAP  Refuses CPAP at night and ordered was asael'd for ongoing use.    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  No recent changes to levothyroxine dose.  TSH in February was 0.60.    Hypertension screen  BP ranges in past month: 111//70.  No reports of chest pain.    PHQ-9 SCORE 5/23/2017       Total Score 4       ALLERGIES: Bactrim [sulfamethoxazole w-trimethoprim]; Gabapentin; Lyrica; Methadone; Nsaids; Penicillin g; Tramadol; Cephalexin hcl; Clindamycin hcl; and Macrobid [nitrofurantoin]   PROBLEM LIST:  Patient Active Problem List   Diagnosis     GERD (gastroesophageal reflux disease)     Systolic murmur     CARDIOVASCULAR SCREENING; LDL GOAL LESS THAN 100     JAMES on CPAP     Osteoporosis     Peripheral neuropathy (H)     Anxiety     Vitamin D deficiency     Mixed incontinence urge and stress     Health Care Home     Frequent UTI, Dr Bigg Muhammad, Urologist     Candidiasis of skin     Dependent edema     Benzodiazepine dependence, pt reports daily use of valium since the 1930's.     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     Chronic low back pain with bilateral sciatica     Macular degeneration (senile) of retina     Recurrent major depressive disorder, in partial remission (H)     Seborrheic dermatitis     Morbid obesity (H)     BMI 35.0-35.9,adult     PAST MEDICAL HISTORY:  has a past medical history of Advanced directives, counseling/discussion, POLST completed 8/27/15, DNR/DNI, No tube feedings, No dialysis,  Do not hospitalize unless comfort can be improved.  OK for IV's and antibiotics (8/27/2015); Agoraphobia without mention of panic attacks; Benzodiazepine  dependence, pt reports daily use of valium since the 1930's. (8/27/2015); Chronic low back pain with bilateral sciatica (6/3/2016); Constipation (8/27/2015); Degenerative disc disease; Dependent edema (8/27/2015); Depressive disorder, not elsewhere classified; Esophageal reflux; Hypothyroidism; Macular degeneration (senile) of retina (8/23/2016); Nasal bleeding (1/28/2016); Obstructive sleep apnea (adult) (pediatric); Postsurgical hypothyroidism, Surgical excision in her 20's. (1/28/2016); Recurrent major depressive disorder, in partial remission (H) (12/6/2016); Seborrheic dermatitis (4/12/2017); Squamous cell carcinoma of skin of upper limb, including shoulder; Type 2 diabetes mellitus without complication (H) (12/16/2015); Unspecified curvature of spine; Unspecified essential hypertension; and Unspecified vitamin D deficiency.  PAST SURGICAL HISTORY:  has a past surgical history that includes Abdomen surgery (1940); hip surgery (2010); Foot surgery (2000); Hysterectomy vaginal (1980'S); Breast surgery (1970'S); Eye surgery (2003); Abdomen surgery (1990's); Head and neck surgery (1960's); knee surgery (9/2004, 11/2007); implant back (4/7/2009); and Implant stimulator and leads sacral nerve (stage one and two) (2/16/2015 2005).  FAMILY HISTORY: family history includes Family History Negative in her son, son, son, and son; HEART DISEASE in her father and mother.  SOCIAL HISTORY:  reports that she quit smoking about 57 years ago. Her smoking use included Cigarettes. She has a 24.00 pack-year smoking history. She has never used smokeless tobacco. She reports that she uses illicit drugs. She reports that she does not drink alcohol.  IMMUNIZATIONS:  Most Recent Immunizations   Administered Date(s) Administered     Influenza (High Dose) 3 valent vaccine 11/12/2015     Influenza Vaccine IM 3yrs+ 4 Valent IIV4 10/06/2016     Mantoux 08/21/2015     Pneumococcal (PCV 13) 08/21/2015     Pneumococcal 23 valent 11/05/2008      TDAP Vaccine (Adacel) 11/30/2012     Zoster vaccine, live 09/24/2007     Above immunizations pulled from [facility]. [facility] and facility records also reconciled.   Future immunizations needed:  yearly influenza per facility protocol  MEDICATIONS:  Current Outpatient Prescriptions   Medication Sig Dispense Refill     chlorhexidine (PERIDEX) 0.12 % solution Swish and spit in mouth 2 times daily Rinse with 1/2 oz BID for 30 secs and expectorate       estradiol (ESTRACE VAGINAL) 0.1 MG/GM cream Place 2 g vaginally three times a week 42.5 g      Cranberry (CRANBERRY CONCENTRATE) 500 MG CAPS Take 1 capsule (500 mg) by mouth 2 times daily 90 capsule      VITAMIN D, CHOLECALCIFEROL, PO Take 2,000 Units by mouth daily       Selenium Sulfide 2.25 % SHAM Externally apply 1 Application topically twice a week       DiazePAM (VALIUM PO) Take 2.5 mg by mouth At Bedtime And q8h prn. Do not give more than 3 doses in 24 hrs,  Including scheduled dose.       Multiple Vitamins-Minerals (PRESERVISION AREDS 2 PO) Take 1 tablet by mouth 2 times daily       Pseudoephedrine-DM-GG (ROBITUSSIN CF PO) Take 5 mLs by mouth as needed       Acetaminophen (TYLENOL PO) Take 650 mg by mouth 4 times daily       polyethylene glycol (MIRALAX/GLYCOLAX) powder Take 17 g by mouth daily as needed for constipation 119 g      oxyCODONE (ROXICODONE) 5 MG immediate release tablet 2.5 mg am and HS and q6h prn pain.  Check with pt before administering morning dose if she has had a prn overnight to see if she would like to hold the morning dose.  0     metFORMIN (GLUCOPHAGE) 500 MG tablet Take 1 tablet (500 mg) by mouth 2 times daily (with meals) 180 tablet 1     pantoprazole (PROTONIX) 40 MG enteric coated tablet Take 1 tablet (40 mg) by mouth daily Take 30-60 minutes before a meal. 180 tablet 3     levothyroxine (SYNTHROID, LEVOTHROID) 125 MCG tablet Take 1 tablet (125 mcg) by mouth daily 90 tablet 1     sodium chloride ([employer] SALINE NASAL SPRAY) 0.65 % nasal  spray Spray 2 sprays into both nostrils 2 times daily       aspirin 81 MG EC tablet Take 1 tablet (81 mg) by mouth daily 90 tablet 3     Ascorbic Acid 1000 MG TABS Take 1,000 mg by mouth daily       furosemide (LASIX) 20 MG tablet Take 1 tablet (20 mg) by mouth daily 90 tablet 4     citalopram (CELEXA) 40 MG tablet TAKE 1 TABLET BY MOUTH DAILY 90 tablet 1     potassium chloride SA (K-DUR,KLOR-CON M) 10 MEQ tablet TAKE ONE TABLET BY MOUTH DAILY 90 tablet 1     methenamine hippurate (HIPREX) 1 G TABS TAKE 1 TABLET BY MOUTH ONCE A DAY.  TAKE ALONG WITH 1000MG VITAMIN-C 180 tablet 4     mirabegron (MYRBETRIQ) 50 MG 24 hr tablet Take 1 tablet (50 mg) by mouth daily 30 tablet 11     Medications reviewed:  Medications reconciled to facility chart and changes were made to reflect current medications as identified as above med list. Below are the changes that were made:   Medications stopped since last EPIC medication reconciliation:   There are no discontinued medications.    Medications started since last [facility] medication reconciliation:  Orders Placed This Encounter   Medications     chlorhexidine (PERIDEX) 0.12 % solution     Sig: Swish and spit in mouth 2 times daily Rinse with 1/2 oz BID for 30 secs and expectorate       Case Management:  I have reviewed the facility/SNF care plan/MDS which was done 5/23/17, including the falls risk, nutrition and pain screening. I also reviewed the current immunizations, and preventive care..Future cancer screening is not clinically indicated secondary to age/goals of care Patient's desire to return to the community is present, but is not able due to care needs . Current Level of Care is appropriate.    Advance Directive Discussion:    I reviewed the current advanced directives as reflected in EPIC, the POLST and the facility chart, and verified the congruency of orders. I reviewed the POLST with the patient (who is her own decision maker) and discussed the plan of Care.  She has opted  "for no changes.  Wants to stay at the NH for acute illnesses, including potential life ending illnesses.      Team Discussion:  I communicated with the appropriate disciplines involved with the Plan of Care:   Nursing:  Nsg manager    Patient Goal:  Patient's goal is pain control and comfort.    Information reviewed:  Medications, vital signs, orders, and nursing notes.    ROS:  Negative selected, CONSTITUTIONAL: weight loss, weight gain, poor appetite and fevers, EYES: tearing, redness and Positive for eyeglasses and macular degeneration.  Followed by Dr. Carlson ENT: [place], dysphagia and positive for h/o epistaxis,  CV: chest pain and Positive for dependent edema., RESPIRATORY: shortness of breath, cough and wheezing, : Positive for frequent UTI's and dribble incontinence. Has implanted stimulator for bladder control...SEE HPI. GI: N&V.  Positive for occasional loose stools and constipation.  See HPI for rectal bleeding.. NEURO: headaches and Positive for variable memory issues - son reports it is dependent on how much valium and oxycodone she has taken & pt agrees.. , and MUSCULOSKELETAL: Positive for chronic back pain.    Exam:  /70  Pulse 64  Temp 97.2  F (36.2  C)  Resp 22  Ht 5' 6\" (1.676 m)  Wt 218 lb (98.9 kg)  BMI 35.19 kg/m2  GENERAL APPEARANCE: Alert, overweight female seen.  Up in w/c.  Well groomed. .  Able to  express self verbally. No obvious acute distress.  HEAD:  Normocephalic.  No facial asymmetry..  NECK  Thick neck.  Trachea midline, no palpable masses.  ENT: Mouth and posterior oropharynx normal, moist mucous membranes, .  EYES: EOM normal, conjunctiva and lids normal. Wearing eyeglasses.  RESP: Quiet, effortless respiration.  No cough.  Diminished lung sounds bilateral lower lobes, otherwise CTA bilaterally.  Old surgical scar from thyroidectomy visible at base of neck and upper chest..  BREASTS:  Large, symmetrical breasts.  No dimpling.  No palpable masses, but difficult to " "evaluate due to size.  No axilla lymphadenopathy.  Pt declines mammography, stated \"I would not do anything about it anyway\".  CV: regular rate and rhythm, 2/6 systolic murmur. Trace/+1 ankle and pedal edema.  MUSC: Slight pain with palpation of lumbar spine and with ROM of knees. Purposeful movement of arms without pain. Implanted pain stimulator palpable in right upper buttock.  ABDOMEN: Large, rounded abdomen. BS's positive all 4 quadrants. No discomfort with palpation of abdomen including suprapubic area.   SKIN:  Raised lesion on lateral left mid shin, approx 1.5 cm of total area, with the lesion approx 0.5 cm, surrounded by mild pinkness. No pain with palpation and pt has no awareness of it.  Some firmness under the skin   NEURO:  Alert and oriented X3.  Purposeful movement of extremities without focal weakness, although legs are weaker than arms due to deconditioning. No tremors or cogwheeling.  Strong hand grasp bilaterally.  PSYCH: Mood is somewhat flat per baseline, but overall positive.  No anxiety    Lab/Diagnostic data:    CBC RESULTS:   Recent Labs   Lab Test 07/14/17 04/17/17 12/05/16 09/27/16 09/21/13   1455  06/01/11   1845   WBC   --    --    --   5.2  5.8   < >  10.9   --    RBC   --    --    --   4.23  4.3   < >  4.77   --    HGB  13.5  13.9   < >  13.1  13.3   < >  14.9  15.0   HCT   --    --    --   40.6  41.2   < >  43.1  49.2   MCV   --    --    --   96.0  95.8   < >  90  94.1   MCH   --    --    --    --    --    --   31.2  28.7   MCHC   --    --    --    --    --    --   34.6  30.5*   RDW   --    --    --   13.3  13.0   < >  12.7  13.7   PLT   --    --    --   175  179   < >  128*   --     < > = values in this interval not displayed.       Last Basic Metabolic Panel:  Recent Labs   Lab Test 07/14/17 04/17/17   NA  138  141   POTASSIUM  4.5  3.9   CHLORIDE  101  104   KURTIS  9.9  9.5   CO2  29  29   BUN  16  14   CR  0.74  0.61   GLC  108*  108*     GFR Estimate   Date Value Ref Range " Status   07/14/2017 >60 >60 ml/min/1.73m2 Final   04/17/2017 >60 >60 mL/min/1.73m2 Final   02/02/2017 >60 >60 ml/min/1.73m2 Final   12/05/2016 >60 ml/min/1.73m2 Final   09/27/2016 >60 >60 ml/min/1.73m2 Final     Lab Results   Component Value Date    A1C 5.8 02/02/2017    A1C 5.7 08/24/2016    A1C 5.8 04/14/2016    A1C 5.9 12/17/2015    A1C 6.2 06/19/2015     TSH   Date Value Ref Range Status   02/02/2017 0.60 0.20 - 4.50 mcU/mL Final   02/02/2017 0.60 0.20 - 4.50 ulU/ml Final   ]    ASSESSMENT/PLAN  (E11.9) Type 2 diabetes mellitus without complication, without long-term current use of insulin (H)  (primary encounter diagnosis)  Comment: Chronic, HgbA1C goal <8%, at goal  Plan: Continue current POC.  HgbA1C at next lab day.    (M54.42,  M54.41,  G89.29) Chronic bilateral low back pain with bilateral sciatica  Comment: Chronic, with dependence on narcotics and benzo's.  Plan: Continue POC.  Offered consideration of cymbalta which patient is declining at this time.    (F33.41) Recurrent major depressive disorder, in partial remission (H)  Comment: Depression screen done: PHQ-9 Given screen score and clinical assessment patient is stable on current plan of care/interventions of citalopram and is not interested in converting to cymbalta for duel therapy of depression and pain.  PLAN:Continue current dose of citalopram.  No dose reduction at this time due to mood and concern for further decline with psychological condition negatively affective quality of life.  On going assessment will continue.    (E66.01) Morbid obesity, unspecified obesity type (H)  Comment: Chronic with multiple comorbidities  Plan: Pt is independent with food choices.  Continue to encourage good food choices as well as increased activity.  Pain and motivation continue to be limiting factors.    (K59.01) Slow transit constipation  Comment: Chronic, but controlled  Plan: Continue current POC.    (G62.9) Peripheral polyneuropathy (H)  Comment:  Chronic  Plan: Monitor    (F13.20) Benzodiazepine dependence, pt reports daily use of valium since the 1930's.  Comment: Chronic  Plan: Comfort is goal of tx.  Continue current dose of valium.    (N39.0) Frequent UTI, Dr Bigg Muhammad, Urologist  Comment: Ongoing  Plan: Continue POC as developed by Dr. Muhammad.  Pt plans to see him soon.    (G47.33,  Z99.89) JAMES on CPAP  Comment: Chronic, pt refusing CPAP.  Plan:  Monitor.    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  Comment: Chronic  Plan: Continue current dose of levothyroxine and check TSH annually.    (Z13.6) Hypertension screen  Comment: Based on JNC-8 goals,  patients age of 88 year old, presence of diabetes or CKD, and goals of care goal BP is <150/90 mm Hg. patient is stable and continue without pharmacological invention with routine assessment.    (L98.9) Leg skin lesion, left  Comment:  Unclear onset  Plan: Nsg to monitor for adverse changes.     Electronically signed by:  SABINE Borjas CNP

## 2017-08-18 ENCOUNTER — NURSING HOME VISIT (OUTPATIENT)
Dept: GERIATRICS | Facility: CLINIC | Age: 82
End: 2017-08-18
Payer: COMMERCIAL

## 2017-08-18 DIAGNOSIS — K59.01 SLOW TRANSIT CONSTIPATION: ICD-10-CM

## 2017-08-18 DIAGNOSIS — E66.01 MORBID OBESITY, UNSPECIFIED OBESITY TYPE (H): ICD-10-CM

## 2017-08-18 DIAGNOSIS — G62.9 PERIPHERAL POLYNEUROPATHY: Chronic | ICD-10-CM

## 2017-08-18 DIAGNOSIS — G47.33 OSA ON CPAP: Chronic | ICD-10-CM

## 2017-08-18 DIAGNOSIS — E11.9 TYPE 2 DIABETES MELLITUS WITHOUT COMPLICATION, WITHOUT LONG-TERM CURRENT USE OF INSULIN (H): Primary | ICD-10-CM

## 2017-08-18 DIAGNOSIS — M54.41 CHRONIC BILATERAL LOW BACK PAIN WITH BILATERAL SCIATICA: ICD-10-CM

## 2017-08-18 DIAGNOSIS — E89.0 POSTSURGICAL HYPOTHYROIDISM: ICD-10-CM

## 2017-08-18 DIAGNOSIS — G89.29 CHRONIC BILATERAL LOW BACK PAIN WITH BILATERAL SCIATICA: ICD-10-CM

## 2017-08-18 DIAGNOSIS — M54.42 CHRONIC BILATERAL LOW BACK PAIN WITH BILATERAL SCIATICA: ICD-10-CM

## 2017-08-18 DIAGNOSIS — N39.0 FREQUENT UTI: ICD-10-CM

## 2017-08-18 DIAGNOSIS — Z13.6 HYPERTENSION SCREEN: ICD-10-CM

## 2017-08-18 DIAGNOSIS — F33.41 RECURRENT MAJOR DEPRESSIVE DISORDER, IN PARTIAL REMISSION (H): ICD-10-CM

## 2017-08-18 DIAGNOSIS — F13.20 BENZODIAZEPINE DEPENDENCE (H): ICD-10-CM

## 2017-08-18 DIAGNOSIS — L98.9 LEG SKIN LESION, LEFT: ICD-10-CM

## 2017-08-18 PROCEDURE — 99318 ZZC ANNUAL NURSING FAC ASSESSMNT, STABLE: CPT | Performed by: NURSE PRACTITIONER

## 2017-08-21 ENCOUNTER — TRANSFERRED RECORDS (OUTPATIENT)
Dept: HEALTH INFORMATION MANAGEMENT | Facility: CLINIC | Age: 82
End: 2017-08-21

## 2017-08-21 LAB
BUN SERPL-MCNC: 19 MG/DL (ref 9–26)
CALCIUM SERPL-MCNC: 10 MG/DL (ref 8.4–10.2)
CHLORIDE SERPLBLD-SCNC: 101 MMOL/L (ref 98–109)
CO2 SERPL-SCNC: 32 MMOL/L (ref 22–31)
CREAT SERPL-MCNC: 0.67 MG/DL (ref 0.55–1.02)
GFR SERPL CREATININE-BSD FRML MDRD: >60 ML/MIN/1.73M2
GLUCOSE SERPL-MCNC: 116 MG/DL (ref 70–100)
HBA1C MFR BLD: 6 % (ref 4–5.6)
HEMOGLOBIN: 13.8 G/DL (ref 11.8–15.5)
POTASSIUM SERPL-SCNC: 4.2 MMOL/L (ref 3.5–5.2)
SODIUM SERPL-SCNC: 141 MMOL/L (ref 136–145)

## 2017-09-05 NOTE — PROGRESS NOTES
[facility]    Chief Complaint   Patient presents with     RECHECK     HPI:    Mireille Taylor is a 88 year old  (8/2/1929), who is being seen today for an episodic care visit at [facility].  HPI information obtained from: facility chart records, facility staff, patient report and [facility] chart review.Today's concern is:  Primary insomnia  Pt requested to speak to me due to her waking most nights between 1 and 2 am.  She feels that back and joint pain is what wakes her.  She takes a valium and oxycodone before bed at 8:30pm and falls to sleep but then awakes as noted above.  In the past, she has tried melatonin for sleep and cannot recall if it was effective, but is willing to try it again.    Chronic bilateral low back pain with bilateral sciatica  Long standing use of opioids.  Likely dependent. Pt has become overly sedated when 12 hour formulation of oxycontin tried and rather than use this again for pain during the night, is willing to try sleep aid.    Benzodiazepine dependence, pt reports daily use of valium since the 1930's.  Receives both scheduled valium as well as a prn most days.  Comfort is goal of tx for patient and she is not interested in treatment for this dependency.      ALLERGIES: Bactrim [sulfamethoxazole w-trimethoprim]; Gabapentin; Lyrica; Methadone; Nsaids; Penicillin g; Tramadol; Cephalexin hcl; Clindamycin hcl; and Macrobid [nitrofurantoin]  Past Medical, Surgical, Family and Social History reviewed and updated in [facility].    Current Outpatient Prescriptions   Medication Sig Dispense Refill     chlorhexidine (PERIDEX) 0.12 % solution Swish and spit in mouth 2 times daily Rinse with 1/2 oz BID for 30 secs and expectorate       estradiol (ESTRACE VAGINAL) 0.1 MG/GM cream Place 2 g vaginally three times a week 42.5 g      Cranberry (CRANBERRY CONCENTRATE) 500 MG CAPS Take 1 capsule (500 mg) by mouth 2 times daily 90 capsule      VITAMIN D,  CHOLECALCIFEROL, PO Take 2,000 Units by mouth daily       Selenium Sulfide 2.25 % SHAM Externally apply 1 Application topically twice a week       DiazePAM (VALIUM PO) Take 2.5 mg by mouth At Bedtime And q8h prn. Do not give more than 3 doses in 24 hrs,  Including scheduled dose.       Multiple Vitamins-Minerals (PRESERVISION AREDS 2 PO) Take 1 tablet by mouth 2 times daily       Pseudoephedrine-DM-GG (ROBITUSSIN CF PO) Take 5 mLs by mouth as needed       Acetaminophen (TYLENOL PO) Take 650 mg by mouth 4 times daily       polyethylene glycol (MIRALAX/GLYCOLAX) powder Take 17 g by mouth daily as needed for constipation 119 g      oxyCODONE (ROXICODONE) 5 MG immediate release tablet 2.5 mg am and HS and q6h prn pain.  Check with pt before administering morning dose if she has had a prn overnight to see if she would like to hold the morning dose.  0     metFORMIN (GLUCOPHAGE) 500 MG tablet Take 1 tablet (500 mg) by mouth 2 times daily (with meals) 180 tablet 1     pantoprazole (PROTONIX) 40 MG enteric coated tablet Take 1 tablet (40 mg) by mouth daily Take 30-60 minutes before a meal. 180 tablet 3     levothyroxine (SYNTHROID, LEVOTHROID) 125 MCG tablet Take 1 tablet (125 mcg) by mouth daily 90 tablet 1     sodium chloride (CVS SALINE NASAL SPRAY) 0.65 % nasal spray Spray 2 sprays into both nostrils 2 times daily       aspirin 81 MG EC tablet Take 1 tablet (81 mg) by mouth daily 90 tablet 3     Ascorbic Acid 1000 MG TABS Take 1,000 mg by mouth daily       furosemide (LASIX) 20 MG tablet Take 1 tablet (20 mg) by mouth daily 90 tablet 4     citalopram (CELEXA) 40 MG tablet TAKE 1 TABLET BY MOUTH DAILY 90 tablet 1     potassium chloride SA (K-DUR,KLOR-CON M) 10 MEQ tablet TAKE ONE TABLET BY MOUTH DAILY 90 tablet 1     methenamine hippurate (HIPREX) 1 G TABS TAKE 1 TABLET BY MOUTH ONCE A DAY.  TAKE ALONG WITH 1000MG VITAMIN-C 180 tablet 4     mirabegron (MYRBETRIQ) 50 MG 24 hr tablet Take 1 tablet (50 mg) by mouth daily 30  tablet 11     Medications reviewed:  Medications reconciled to facility chart and changes were made to reflect current medications as identified as above med list. Below are the changes that were made:   Medications stopped since last EPIC medication reconciliation:   There are no discontinued medications.    Medications started since last [facility] medication reconciliation:  No orders of the defined types were placed in this encounter.    Patient Active Problem List   Diagnosis     GERD (gastroesophageal reflux disease)     Systolic murmur     CARDIOVASCULAR SCREENING; LDL GOAL LESS THAN 100     JAMES on CPAP     Osteoporosis     Peripheral neuropathy (H)     Anxiety     Vitamin D deficiency     Mixed incontinence urge and stress     Health Care Home     Frequent UTI, Dr Bigg Muhammad, Urologist     Candidiasis of skin     Dependent edema     Benzodiazepine dependence, pt reports daily use of valium since the 1930's.     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     Chronic low back pain with bilateral sciatica     Macular degeneration (senile) of retina     Recurrent major depressive disorder, in partial remission (H)     Seborrheic dermatitis     Morbid obesity (H)     BMI 35.0-35.9,adult     Leg skin lesion, left     REVIEW OF SYSTEMS:  Negative selected, CONSTITUTIONAL: weight loss, weight gain, poor appetite and fevers, EYES: tearing, redness and Positive for eyeglasses and macular degeneration.  Followed by Dr. Carlson ENT: [place], dysphagia and positive for h/o epistaxis,  CV: chest pain and Positive for dependent edema., RESPIRATORY: shortness of breath, cough and wheezing, : Positive for frequent UTI's and dribble incontinence. Has implanted stimulator for bladder control.... GI: N&V.  Positive for occasional loose stools and constipation.  H/o occ rectal bleeding.. NEURO: headaches and Positive for  "variable memory issues - son reports it is dependent on how much valium and oxycodone she has taken & pt agrees.. , and MUSCULOSKELETAL: Positive for chronic back pain.    Physical Exam:  /67  Pulse 56  Temp 98  F (36.7  C)  Resp 18  Ht 5' 6\" (1.676 m)  Wt 219 lb (99.3 kg)  BMI 35.35 kg/m2  GENERAL APPEARANCE: Alert, overweight female seen.  Up in w/c.  Well groomed. .  Able to  express self verbally. No obvious acute distress.  HEAD:  Normocephalic.  No facial asymmetry..  ENT: Mouth and posterior oropharynx normal, moist mucous membranes, .  EYES: EOM normal, conjunctiva and lids normal. Wearing eyeglasses.  RESP: Quiet, effortless respiration.  No cough.  Diminished lung sounds bilateral lower lobes, otherwise CTA bilaterally.  Old surgical scar from thyroidectomy visible at base of neck and upper chest..  CV: regular rate and rhythm, 2/6 systolic murmur. Trace/+1 ankle and pedal edema.  MUSC: Slight pain with palpation of lumbar spine and with ROM of knees. Purposeful movement of arms without pain. Implanted pain stimulator palpable in right upper buttock.  ABDOMEN: Large, rounded abdomen. BS's positive all 4 quadrants. No discomfort with palpation of abdomen including suprapubic area.   NEURO:  Alert and oriented X3.  Purposeful movement of extremities without focal weakness, although legs are weaker than arms due to deconditioning. No tremors or cogwheeling.  Strong hand grasp bilaterally.  PSYCH: Mood is somewhat flat per baseline, but overall positive.  No anxiety    Recent Labs:    CBC RESULTS:   Recent Labs   Lab Test 08/21/17 07/14/17 12/05/16 09/27/16 09/21/13   1455  06/01/11   1845   WBC   --    --    --   5.2  5.8   < >  10.9   --    RBC   --    --    --   4.23  4.3   < >  4.77   --    HGB  13.8  13.5   < >  13.1  13.3   < >  14.9  15.0   HCT   --    --    --   40.6  41.2   < >  43.1  49.2   MCV   --    --    --   96.0  95.8   < >  90  94.1   MCH   --    --    --    --    --    --   " 31.2  28.7   MCHC   --    --    --    --    --    --   34.6  30.5*   RDW   --    --    --   13.3  13.0   < >  12.7  13.7   PLT   --    --    --   175  179   < >  128*   --     < > = values in this interval not displayed.       Last Basic Metabolic Panel:  Recent Labs   Lab Test 08/21/17 07/14/17   NA  141  138   POTASSIUM  4.2  4.5   CHLORIDE  101  101   KURTIS  10.0  9.9   CO2  32*  29   BUN  19  16   CR  0.67  0.74   GLC  116*  108*     GFR Estimate   Date Value Ref Range Status   08/21/2017 >60 >60 mL/min/1.73m2 Final   07/14/2017 >60 >60 ml/min/1.73m2 Final   04/17/2017 >60 >60 mL/min/1.73m2 Final   02/02/2017 >60 >60 ml/min/1.73m2 Final   12/05/2016 >60 ml/min/1.73m2 Final       Lab Results   Component Value Date    A1C 6.0 08/21/2017    A1C 5.8 02/02/2017     Assessment/Plan:  (F51.01) Primary insomnia  (primary encounter diagnosis)  Comment: Related to pain and benzodiazepine wearing off.  Plan: Will try melatonin to determine if this will extend the sleeptime.  Monitor.    (M54.42,  M54.41,  G89.29) Chronic bilateral low back pain with bilateral sciatica  Comment: Chronic, likely opioid dependence  Plan: Continue current pain meds.  Has implantable device for back pain control.  Long acting opioids have resulted in excessive sedation.    (F13.20) Benzodiazepine dependence, pt reports daily use of valium since the 1930's.  Comment: Ongoing  Plan: Continue current dose of valium.  Pt is not interested in treatment.    Electronically signed by  SABINE Borjas CNP

## 2017-09-06 ENCOUNTER — NURSING HOME VISIT (OUTPATIENT)
Dept: GERIATRICS | Facility: CLINIC | Age: 82
End: 2017-09-06
Payer: COMMERCIAL

## 2017-09-06 VITALS
RESPIRATION RATE: 18 BRPM | TEMPERATURE: 98 F | SYSTOLIC BLOOD PRESSURE: 123 MMHG | WEIGHT: 219 LBS | HEART RATE: 56 BPM | DIASTOLIC BLOOD PRESSURE: 67 MMHG | HEIGHT: 66 IN | BODY MASS INDEX: 35.2 KG/M2

## 2017-09-06 DIAGNOSIS — M54.41 CHRONIC BILATERAL LOW BACK PAIN WITH BILATERAL SCIATICA: ICD-10-CM

## 2017-09-06 DIAGNOSIS — F51.01 PRIMARY INSOMNIA: Primary | ICD-10-CM

## 2017-09-06 DIAGNOSIS — G89.29 CHRONIC BILATERAL LOW BACK PAIN WITH BILATERAL SCIATICA: ICD-10-CM

## 2017-09-06 DIAGNOSIS — F13.20 BENZODIAZEPINE DEPENDENCE (H): ICD-10-CM

## 2017-09-06 DIAGNOSIS — M54.42 CHRONIC BILATERAL LOW BACK PAIN WITH BILATERAL SCIATICA: ICD-10-CM

## 2017-09-06 PROCEDURE — 99309 SBSQ NF CARE MODERATE MDM 30: CPT | Performed by: NURSE PRACTITIONER

## 2017-10-04 ENCOUNTER — NURSING HOME VISIT (OUTPATIENT)
Dept: GERIATRICS | Facility: CLINIC | Age: 82
End: 2017-10-04
Payer: COMMERCIAL

## 2017-10-04 VITALS
HEART RATE: 64 BPM | DIASTOLIC BLOOD PRESSURE: 61 MMHG | TEMPERATURE: 97.5 F | SYSTOLIC BLOOD PRESSURE: 128 MMHG | RESPIRATION RATE: 16 BRPM | WEIGHT: 231 LBS | BODY MASS INDEX: 37.12 KG/M2 | HEIGHT: 66 IN | OXYGEN SATURATION: 90 %

## 2017-10-04 DIAGNOSIS — F13.20 BENZODIAZEPINE DEPENDENCE (H): ICD-10-CM

## 2017-10-04 DIAGNOSIS — E66.01 MORBID OBESITY (H): ICD-10-CM

## 2017-10-04 DIAGNOSIS — M54.41 CHRONIC MIDLINE LOW BACK PAIN WITH BILATERAL SCIATICA: ICD-10-CM

## 2017-10-04 DIAGNOSIS — M54.42 CHRONIC MIDLINE LOW BACK PAIN WITH BILATERAL SCIATICA: ICD-10-CM

## 2017-10-04 DIAGNOSIS — R05.9 COUGH: Primary | ICD-10-CM

## 2017-10-04 DIAGNOSIS — G89.29 CHRONIC MIDLINE LOW BACK PAIN WITH BILATERAL SCIATICA: ICD-10-CM

## 2017-10-04 DIAGNOSIS — E89.0 POSTSURGICAL HYPOTHYROIDISM: ICD-10-CM

## 2017-10-04 DIAGNOSIS — L98.9 LEG SKIN LESION, LEFT: ICD-10-CM

## 2017-10-04 DIAGNOSIS — E11.9 TYPE 2 DIABETES MELLITUS WITHOUT COMPLICATION, WITHOUT LONG-TERM CURRENT USE OF INSULIN (H): ICD-10-CM

## 2017-10-04 PROCEDURE — 99309 SBSQ NF CARE MODERATE MDM 30: CPT | Performed by: NURSE PRACTITIONER

## 2017-10-04 RX ORDER — ALBUTEROL SULFATE 0.83 MG/ML
1 SOLUTION RESPIRATORY (INHALATION) 3 TIMES DAILY
Qty: 360 ML
Start: 2017-10-04 | End: 2017-10-19

## 2017-10-04 NOTE — PROGRESS NOTES
[facility]    Chief Complaint   Patient presents with     RECHECK     HPI:    Mireille Taylor is a 88 year old  (8/2/1929), who is being seen today for an episodic care visit at [facility].  HPI information obtained from: facility chart records, facility staff, patient report and [facility] chart review.Today's concern is:  Cough  Pt seen for new onset of loose productive cough.  She reports that it started overnight and she is coughing up large amounts of clear phlegm.  Slight headache and sore throat.  No chest pain or new GI issues.  Has not been febrile    Type 2 diabetes mellitus without complication, without long-term current use of insulin (H)  No recent change to metformin dose.  Fasting accuchecks in past month: 138-141. 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  TSH was .60 on 2/2/17.  No recent changes to levothyroxine dose.    Chronic midline low back pain with bilateral sciatica  Continues on bid low dose oxycodone.  Is receiving 1-2 doses a day of prn oxycodone.  Usually wakes up around midnight after falling to sleep at 9pm and asks for it and then awakes at approx 3 am and asks for a valium.  Discussed today and she believes that insomnia is what awakes her and if she has a sleep aid, she may not need the prn oxycodone or valium.  Had declined melatonin due to insurance non payment, but is now willing to pay for it and give it a try (once her current illness is resolved).    Morbid obesity (H)  No weight loss.  Weight is stable though.  Limited activity due to her spinal pain.    Benzodiazepine dependence, pt reports daily use of valium since the 1930's.  Continues with daily and prn use of valium.  Does admit to daytime sleepiness, likely due to her night time use of oxycodone and valium.      Left leg lesion  Seeing dermatology on 10/9 for evaluation of non healing painful left calf lesion.    ALLERGIES:  Bactrim [sulfamethoxazole w-trimethoprim]; Gabapentin; Lyrica; Methadone; Nsaids; Penicillin g; Tramadol; Cephalexin hcl; Clindamycin hcl; and Macrobid [nitrofurantoin]  Past Medical, Surgical, Family and Social History reviewed and updated in EPIC.    Current Outpatient Prescriptions   Medication Sig Dispense Refill     chlorhexidine (PERIDEX) 0.12 % solution Swish and spit in mouth 2 times daily Rinse with 1/2 oz BID for 30 secs and expectorate       estradiol (ESTRACE VAGINAL) 0.1 MG/GM cream Place 2 g vaginally three times a week 42.5 g      Cranberry (CRANBERRY CONCENTRATE) 500 MG CAPS Take 1 capsule (500 mg) by mouth 2 times daily 90 capsule      VITAMIN D, CHOLECALCIFEROL, PO Take 2,000 Units by mouth daily       Selenium Sulfide 2.25 % SHAM Externally apply 1 Application topically twice a week       DiazePAM (VALIUM PO) Take 2.5 mg by mouth At Bedtime And q8h prn. Do not give more than 3 doses in 24 hrs,  Including scheduled dose.       Multiple Vitamins-Minerals (PRESERVISION AREDS 2 PO) Take 1 tablet by mouth 2 times daily       Pseudoephedrine-DM-GG (ROBITUSSIN CF PO) Take 5 mLs by mouth as needed       Acetaminophen (TYLENOL PO) Take 650 mg by mouth 4 times daily       polyethylene glycol (MIRALAX/GLYCOLAX) powder Take 17 g by mouth daily as needed for constipation 119 g      oxyCODONE (ROXICODONE) 5 MG immediate release tablet 2.5 mg am and HS and q6h prn pain.  Check with pt before administering morning dose if she has had a prn overnight to see if she would like to hold the morning dose.  0     metFORMIN (GLUCOPHAGE) 500 MG tablet Take 1 tablet (500 mg) by mouth 2 times daily (with meals) 180 tablet 1     pantoprazole (PROTONIX) 40 MG enteric coated tablet Take 1 tablet (40 mg) by mouth daily Take 30-60 minutes before a meal. 180 tablet 3     levothyroxine (SYNTHROID, LEVOTHROID) 125 MCG tablet Take 1 tablet (125 mcg) by mouth daily 90 tablet 1     sodium chloride (CVS SALINE NASAL SPRAY) 0.65 % nasal  spray Spray 2 sprays into both nostrils 2 times daily       aspirin 81 MG EC tablet Take 1 tablet (81 mg) by mouth daily 90 tablet 3     Ascorbic Acid 1000 MG TABS Take 1,000 mg by mouth daily       furosemide (LASIX) 20 MG tablet Take 1 tablet (20 mg) by mouth daily 90 tablet 4     citalopram (CELEXA) 40 MG tablet TAKE 1 TABLET BY MOUTH DAILY 90 tablet 1     potassium chloride SA (K-DUR,KLOR-CON M) 10 MEQ tablet TAKE ONE TABLET BY MOUTH DAILY 90 tablet 1     methenamine hippurate (HIPREX) 1 G TABS TAKE 1 TABLET BY MOUTH ONCE A DAY.  TAKE ALONG WITH 1000MG VITAMIN-C 180 tablet 4     mirabegron (MYRBETRIQ) 50 MG 24 hr tablet Take 1 tablet (50 mg) by mouth daily 30 tablet 11     Medications reviewed:  Medications reconciled to facility chart and changes were made to reflect current medications as identified as above med list. Below are the changes that were made:   Medications stopped since last EPIC medication reconciliation:   There are no discontinued medications.    Medications started since last [facility] medication reconciliation:  No orders of the defined types were placed in this encounter.    Patient Active Problem List   Diagnosis     GERD (gastroesophageal reflux disease)     Systolic murmur     CARDIOVASCULAR SCREENING; LDL GOAL LESS THAN 100     JAMES on CPAP     Osteoporosis     Peripheral neuropathy     Anxiety     Vitamin D deficiency     Mixed incontinence urge and stress     Health Care Home     Frequent UTI, Dr Bigg Muhammad, Urologist     Candidiasis of skin     Dependent edema     Benzodiazepine dependence, pt reports daily use of valium since the 1930's.     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     Chronic low back pain with bilateral sciatica     Macular degeneration (senile) of retina     Recurrent major depressive disorder, in partial remission (H)     Seborrheic dermatitis      "Morbid obesity (H)     BMI 35.0-35.9,adult     Leg skin lesion, left       REVIEW OF SYSTEMS:  Negative selected, CONSTITUTIONAL: weight loss, weight gain, poor appetite and fevers, EYES: tearing, redness and Positive for eyeglasses and macular degeneration.  Followed by Dr. Carlson ENT: [place], dysphagia and positive for h/o epistaxis,  CV: chest pain and Positive for dependent edema., RESPIRATORY: shortness of breath, Positive for cough and wheezing, : Positive for frequent UTI's and dribble incontinence. Has implanted stimulator for bladder control.... GI: N&V.  Positive for occasional loose stools and constipation.  H/o occ rectal bleeding.. NEURO: headaches and Positive for variable memory issues - son reports it is dependent on how much valium and oxycodone she has taken & pt agrees and MUSCULOSKELETAL: Positive for chronic back pain.    Physical Exam:  /61  Pulse 64  Temp 97.5  F (36.4  C)  Resp 16  Ht 5' 6\" (1.676 m)  Wt 231 lb (104.8 kg)  SpO2 90%  BMI 37.28 kg/m2  GENERAL APPEARANCE: Alert, overweight female seen.  Up in w/c.  Well groomed. .  Able to  express self verbally. Pale facial color.  HEAD:  Normocephalic.  No facial asymmetry..  ENT: Mouth and posterior oropharynx normal, moist mucous membranes, .  EYES: EOM normal, conjunctiva and lids normal. Wearing eyeglasses.  RESP: Frequent cough.  Diminished lung sounds bilateral lower lobes, with faint expiratory wheezes in right lung.  Old surgical scar from thyroidectomy visible at base of neck and upper chest..  CV: regular rate and rhythm, 2/6 systolic murmur. +1 ankle and pedal edema.  MUSC: Slight pain with palpation of lumbar spine and with ROM of knees. Purposeful movement of arms without pain. Implanted pain stimulator palpable in right upper buttock.  ABDOMEN: Large, rounded abdomen. BS's positive all 4 quadrants. No discomfort with palpation of abdomen including suprapubic area.  SKIN:  2 cm dark wound surrounded by light redness.  " Tender to touch.  Irregular edges with slight firmness palpated below lesion.   NEURO:  Alert and oriented X3.  Purposeful movement of extremities without focal weakness, although legs are weaker than arms due to deconditioning. No tremors or cogwheeling.  Strong hand grasp bilaterally.  PSYCH: Mood is somewhat flat per baseline, but is interactive and grateful for visit.  Forgetful about past discussions..  No anxiety      Recent Labs:    CBC RESULTS:   Recent Labs   Lab Test 08/21/17 07/14/17 12/05/16 09/27/16 09/21/13   1455  06/01/11   1845   WBC   --    --    --   5.2  5.8   < >  10.9   --    RBC   --    --    --   4.23  4.3   < >  4.77   --    HGB  13.8  13.5   < >  13.1  13.3   < >  14.9  15.0   HCT   --    --    --   40.6  41.2   < >  43.1  49.2   MCV   --    --    --   96.0  95.8   < >  90  94.1   MCH   --    --    --    --    --    --   31.2  28.7   MCHC   --    --    --    --    --    --   34.6  30.5*   RDW   --    --    --   13.3  13.0   < >  12.7  13.7   PLT   --    --    --   175  179   < >  128*   --     < > = values in this interval not displayed.       Last Basic Metabolic Panel:  Recent Labs   Lab Test 08/21/17 07/14/17   NA  141  138   POTASSIUM  4.2  4.5   CHLORIDE  101  101   KURTIS  10.0  9.9   CO2  32*  29   BUN  19  16   CR  0.67  0.74   GLC  116*  108*     GFR Estimate   Date Value Ref Range Status   08/21/2017 >60 >60 mL/min/1.73m2 Final   07/14/2017 >60 >60 ml/min/1.73m2 Final   04/17/2017 >60 >60 mL/min/1.73m2 Final   02/02/2017 >60 >60 ml/min/1.73m2 Final   12/05/2016 >60 ml/min/1.73m2 Final       Lab Results   Component Value Date    A1C 6.0 08/21/2017    A1C 5.8 02/02/2017     TSH   Date Value Ref Range Status   02/02/2017 0.60 0.20 - 4.50 mcU/mL Final   02/02/2017 0.60 0.20 - 4.50 ulU/ml Final   ]    Assessment/Plan:  (R05) Cough  (primary encounter diagnosis)  Comment: Acute, likely viral, but will work up due to risk factors.  Plan: STAT chest XRay.  CBC and BMP in am.  Albuterol  nebs tid for 5 days.  Robitussin cough syrup bid and prn.  Monitor VS's and encourage fluids.    (E11.9) Type 2 diabetes mellitus without complication, without long-term current use of insulin (H)  Comment: Chronic, HgbA1C goal <7%, at goal  Plan: Continue current POC.  HgbA1C q6m    (E89.0) Postsurgical hypothyroidism, Surgical excision in her 20's.  Comment: Chronic  Plan: Continue levothyroxine dose and check TSH annually.    (M54.41,  M54.42,  G89.29) Chronic midline low back pain with bilateral sciatica  Comment: Chronic  Plan: Continue current pain meds.  After tx for acute illness, retry melatonin for sleep and evaluate effectiveness.    (E66.01) Morbid obesity (H)  Comment: Chronic, weight loss unlikely due to level of inactivity  Plan: Continue to encourage positive food choices.    (F13.20) Benzodiazepine dependence, pt reports daily use of valium since the 1930's.  Comment: Chronic  Plan:  After tx for acute respiratory illness, consider melatonin for sleep and monitor if prn use of valium is decreased.    (L98.9) Leg skin lesion, left  Comment: Non healing, concerning for cancer  Plan: Dermatology appt on Monday.    Total time spent with patient visit at the skilled nursing facility was 35 min including patient visit, review of past records and phone call to patient contact. Greater than 50% of total time spent with counseling and coordinating care due to acute respiratory illness and other comorbid conditions.    Electronically signed by  SABINE Borjas CNP

## 2017-10-05 ENCOUNTER — TRANSFERRED RECORDS (OUTPATIENT)
Dept: HEALTH INFORMATION MANAGEMENT | Facility: CLINIC | Age: 82
End: 2017-10-05

## 2017-10-05 LAB
DIFFERENTIAL: NORMAL
ERYTHROCYTE [DISTWIDTH] IN BLOOD BY AUTOMATED COUNT: 13.4 % (ref 11–15)
HCT VFR BLD AUTO: 42.1 % (ref 35–46)
HEMOGLOBIN: 13.2 G/DL (ref 11.8–15.5)
MCV RBC AUTO: 96.8 FL (ref 80–100)
PLATELET # BLD AUTO: 153 K/CMM (ref 140–450)
RBC # BLD AUTO: 4.35 M/CMM (ref 3.7–5.2)
WBC # BLD AUTO: 9.3 K/CMM (ref 3.8–11)

## 2017-10-16 ENCOUNTER — NURSING HOME VISIT (OUTPATIENT)
Dept: GERIATRICS | Facility: CLINIC | Age: 82
End: 2017-10-16

## 2017-10-16 DIAGNOSIS — E11.9 TYPE 2 DIABETES MELLITUS WITHOUT COMPLICATION, WITHOUT LONG-TERM CURRENT USE OF INSULIN (H): ICD-10-CM

## 2017-10-16 DIAGNOSIS — R05.9 COUGH: Primary | ICD-10-CM

## 2017-10-18 NOTE — PROGRESS NOTES
Pt was seen for a regulatory LTC visit  Case reviewed with NP    Pt has been ill with a cough, chest congestion, likely secondary to a viral URI. She is completing a 7 day course of prednisone.. CXR 10/4/17 was clear    Pt notes some improvement in cough, but still feels ill with a non-productive cough, SOB  Staff notes paroxysms of coughing, particularly at night  She has not recently had a fever    VSS  Alert, sitting in WC, appears comfortable, RR 12 unlabored, though Pt c/o SOB at the time of exam  No coughing noted during interview and exam  Lungs clear  CV rrr  Abd soft      Assessment    URI, with persistent cough.  Exam currently unremarkable.  DM, stable on metformin    Plan  Extend course of prednisone for another 3-4 days  Continue neb tx  Repeat CXR if Pt has persistent cough or fever  Monitor BG

## 2017-10-19 ENCOUNTER — TRANSFERRED RECORDS (OUTPATIENT)
Dept: HEALTH INFORMATION MANAGEMENT | Facility: CLINIC | Age: 82
End: 2017-10-19

## 2017-10-19 ENCOUNTER — NURSING HOME VISIT (OUTPATIENT)
Dept: GERIATRICS | Facility: CLINIC | Age: 82
End: 2017-10-19
Payer: COMMERCIAL

## 2017-10-19 VITALS
RESPIRATION RATE: 20 BRPM | HEIGHT: 66 IN | HEART RATE: 92 BPM | TEMPERATURE: 97.8 F | OXYGEN SATURATION: 92 % | WEIGHT: 229 LBS | DIASTOLIC BLOOD PRESSURE: 78 MMHG | BODY MASS INDEX: 36.8 KG/M2 | SYSTOLIC BLOOD PRESSURE: 126 MMHG

## 2017-10-19 DIAGNOSIS — G47.33 OSA (OBSTRUCTIVE SLEEP APNEA): ICD-10-CM

## 2017-10-19 DIAGNOSIS — E11.9 TYPE 2 DIABETES MELLITUS WITHOUT COMPLICATION, WITHOUT LONG-TERM CURRENT USE OF INSULIN (H): ICD-10-CM

## 2017-10-19 DIAGNOSIS — J20.9 ACUTE BRONCHITIS WITH SYMPTOMS > 10 DAYS: Primary | ICD-10-CM

## 2017-10-19 DIAGNOSIS — I49.9 IRREGULAR HEART BEAT: ICD-10-CM

## 2017-10-19 DIAGNOSIS — R60.0 LOCALIZED EDEMA: ICD-10-CM

## 2017-10-19 LAB
BUN SERPL-MCNC: 18 MG/DL (ref 9–26)
CALCIUM SERPL-MCNC: 9.9 MG/DL (ref 8.4–10.2)
CHLORIDE SERPLBLD-SCNC: 97 MMOL/L (ref 98–109)
CO2 SERPL-SCNC: 32 MMOL/L (ref 22–31)
CREAT SERPL-MCNC: 0.78 MG/DL (ref 0.55–1.02)
DIFFERENTIAL: NORMAL
ERYTHROCYTE [DISTWIDTH] IN BLOOD BY AUTOMATED COUNT: 13.1 % (ref 11–15)
GFR SERPL CREATININE-BSD FRML MDRD: >60 ML/MIN/1.73M2
GLUCOSE SERPL-MCNC: 216 MG/DL (ref 70–100)
HCT VFR BLD AUTO: 42.6 % (ref 35–46)
HEMOGLOBIN: 13.3 G/DL (ref 11.8–15.5)
MCV RBC AUTO: 97.9 FL (ref 80–100)
PLATELET # BLD AUTO: 201 K/CMM (ref 140–450)
POTASSIUM SERPL-SCNC: 4.1 MMOL/L (ref 3.5–5.2)
RBC # BLD AUTO: 4.35 M/CMM (ref 3.7–5.2)
SODIUM SERPL-SCNC: 139 MMOL/L (ref 136–145)
WBC # BLD AUTO: 8.8 K/CMM (ref 3.8–11)

## 2017-10-19 PROCEDURE — 99207 ZZC CDG-CORRECTLY CODED, REVIEWED AND AGREE: CPT | Performed by: NURSE PRACTITIONER

## 2017-10-19 PROCEDURE — 99310 SBSQ NF CARE HIGH MDM 45: CPT | Performed by: NURSE PRACTITIONER

## 2017-10-19 RX ORDER — IPRATROPIUM BROMIDE AND ALBUTEROL SULFATE 2.5; .5 MG/3ML; MG/3ML
1 SOLUTION RESPIRATORY (INHALATION) 4 TIMES DAILY
COMMUNITY
Start: 2017-10-16 | End: 2017-10-20

## 2017-10-19 RX ORDER — AZITHROMYCIN 250 MG/1
TABLET, FILM COATED ORAL
Qty: 6 TABLET
Start: 2017-10-19 | End: 2017-10-23

## 2017-10-19 RX ORDER — IPRATROPIUM BROMIDE AND ALBUTEROL SULFATE 2.5; .5 MG/3ML; MG/3ML
1 SOLUTION RESPIRATORY (INHALATION) EVERY 4 HOURS PRN
COMMUNITY
End: 2017-10-20

## 2017-10-19 NOTE — PROGRESS NOTES
"[facility]    Chief Complaint   Patient presents with     Nursing Home Acute     HPI:    Mireille Taylor is a 88 year old  (8/2/1929), who is being seen today for an episodic care visit at [facility], [place].  HPI information obtained from: facility chart records, facility staff, patient report and [facility] chart review.Today's concern is:  Acute bronchitis with symptoms > 10 days  Pt has been ill with productive cough for 2 weeks.  Has been afebrile and O2 sats >90% on room air.  Has had audible wheezing and is on nebs.  Due to ongoing reactive airway issues, also placed on prednisone.  Wheezing improves with nebs and prednisone but productive cough continues.  Pt reports that she coughs very hard and will \"throw up\" white phlegm which will ease the cough.  Also on Robitussin which is minimally helpful. Cough is very difficult at night causing a significant sleep disturbance. Chest XRay completed early in illness, which was negative for pneumonia and was repeated again today.  Chest XRay showed that the study was limited by technical factors including reduced inspiration and obesity.  But did not show any overt consolidation or pleural fluid. Labs today showed a normal WBC, but slightly altered Chloride and CO2.      Irregular heart beat  Noted on exam today.  Patient denies any h/o atrial fibrillation or feelings of palpitations. No nsg reports of irregular heart beats, but pulse this past week has ranged from .    JAMES (obstructive sleep apnea)  Has known long standing sleep apnea, but refuses to wear CPAP.    Type 2 diabetes mellitus without complication, without long-term current use of insulin (H)  On metformin with fasting accucheck q2w.   HgbA1C was 6% in August.    Localized edema  Chronic LE edema and is on low dose lasix and KCL.  Pt feels that her edema may be increased. Weight does appear to be 10 lbs from baseline in August.     ALLERGIES: Bactrim " [sulfamethoxazole w-trimethoprim]; Gabapentin; Lyrica; Methadone; Nsaids; Penicillin g; Tramadol; Cephalexin hcl; Clindamycin hcl; and Macrobid [nitrofurantoin]  Past Medical, Surgical, Family and Social History reviewed and updated in EPIC.    Current Outpatient Prescriptions   Medication Sig Dispense Refill     PREDNISONE PO Take 10 mg by mouth daily       ipratropium - albuterol 0.5 mg/2.5 mg/3 mL (DUONEB) 0.5-2.5 (3) MG/3ML neb solution Take 1 vial by nebulization 4 times daily       ipratropium - albuterol 0.5 mg/2.5 mg/3 mL (DUONEB) 0.5-2.5 (3) MG/3ML neb solution Take 1 vial by nebulization every 4 hours as needed for shortness of breath / dyspnea or wheezing       chlorhexidine (PERIDEX) 0.12 % solution Swish and spit in mouth 2 times daily Rinse with 1/2 oz BID for 30 secs and expectorate       estradiol (ESTRACE VAGINAL) 0.1 MG/GM cream Place 2 g vaginally three times a week 42.5 g      Cranberry (CRANBERRY CONCENTRATE) 500 MG CAPS Take 1 capsule (500 mg) by mouth 2 times daily 90 capsule      VITAMIN D, CHOLECALCIFEROL, PO Take 2,000 Units by mouth daily       Selenium Sulfide 2.25 % SHAM Externally apply 1 Application topically twice a week       DiazePAM (VALIUM PO) Take 2.5 mg by mouth At Bedtime And q8h prn. Do not give more than 3 doses in 24 hrs,  Including scheduled dose.       Multiple Vitamins-Minerals (PRESERVISION AREDS 2 PO) Take 1 tablet by mouth 2 times daily       Pseudoephedrine-DM-GG (ROBITUSSIN CF PO) Take 5 mLs by mouth as needed       Acetaminophen (TYLENOL PO) Take 650 mg by mouth 4 times daily       polyethylene glycol (MIRALAX/GLYCOLAX) powder Take 17 g by mouth daily as needed for constipation 119 g      oxyCODONE (ROXICODONE) 5 MG immediate release tablet 2.5 mg am and HS and q6h prn pain.  Check with pt before administering morning dose if she has had a prn overnight to see if she would like to hold the morning dose.  0     metFORMIN (GLUCOPHAGE) 500 MG tablet Take 1 tablet (500  mg) by mouth 2 times daily (with meals) 180 tablet 1     pantoprazole (PROTONIX) 40 MG enteric coated tablet Take 1 tablet (40 mg) by mouth daily Take 30-60 minutes before a meal. 180 tablet 3     levothyroxine (SYNTHROID, LEVOTHROID) 125 MCG tablet Take 1 tablet (125 mcg) by mouth daily 90 tablet 1     sodium chloride ([employer]S SALINE NASAL SPRAY) 0.65 % nasal spray Spray 2 sprays into both nostrils 2 times daily       aspirin 81 MG EC tablet Take 1 tablet (81 mg) by mouth daily 90 tablet 3     Ascorbic Acid 1000 MG TABS Take 1,000 mg by mouth daily       furosemide (LASIX) 20 MG tablet Take 1 tablet (20 mg) by mouth daily 90 tablet 4     citalopram (CELEXA) 40 MG tablet TAKE 1 TABLET BY MOUTH DAILY 90 tablet 1     potassium chloride SA (K-DUR,KLOR-CON M) 10 MEQ tablet TAKE ONE TABLET BY MOUTH DAILY 90 tablet 1     methenamine hippurate (HIPREX) 1 G TABS TAKE 1 TABLET BY MOUTH ONCE A DAY.  TAKE ALONG WITH 1000MG VITAMIN-C 180 tablet 4     mirabegron (MYRBETRIQ) 50 MG 24 hr tablet Take 1 tablet (50 mg) by mouth daily 30 tablet 11     Medications reviewed:  Medications reconciled to facility chart and changes were made to reflect current medications as identified as above med list. Below are the changes that were made:   Medications stopped since last EPIC medication reconciliation:   There are no discontinued medications.    Medications started since last [facility] medication reconciliation:  No orders of the defined types were placed in this encounter.    Patient Active Problem List   Diagnosis     GERD (gastroesophageal reflux disease)     Systolic murmur     CARDIOVASCULAR SCREENING; LDL GOAL LESS THAN 100     JAMES on CPAP     Osteoporosis     Peripheral neuropathy     Anxiety     Vitamin D deficiency     Mixed incontinence urge and stress     Health Care Home     Frequent UTI, Dr Bigg Muhammad, Urologist     Candidiasis of skin     Dependent edema     Benzodiazepine dependence, pt reports daily use of valium since the  "1930's.     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     Chronic low back pain with bilateral sciatica     Macular degeneration (senile) of retina     Recurrent major depressive disorder, in partial remission (H)     Seborrheic dermatitis     Morbid obesity (H)     BMI 35.0-35.9,adult     Leg skin lesion, left       REVIEW OF SYSTEMS:  Negative selected, CONSTITUTIONAL: weight loss, weight gain, poor appetite and fevers, EYES: tearing, redness and Positive for eyeglasses and macular degeneration.  Followed by Dr. Carlson ENT: [place], dysphagia and positive for h/o epistaxis,  CV: chest pain and Positive for dependent edema and now weight gain with irregular heartrate.., RESPIRATORY: shortness of breath, Positive for cough and wheezing, : Positive for frequent UTI's and dribble incontinence. Has implanted stimulator for bladder control.... GI: N&V.  Positive for occasional loose stools and constipation.  H/o occ rectal bleeding.. NEURO: headaches and Positive for variable memory issues - son reports it is dependent on how much valium and oxycodone she has taken & pt agrees and MUSCULOSKELETAL: Positive for chronic back pain.    Physical Exam:  /78  Pulse 92  Temp 97.8  F (36.6  C)  Resp 20  Ht 5' 6\" (1.676 m)  Wt 229 lb (103.9 kg)  SpO2 92%  BMI 36.96 kg/m2  GENERAL APPEARANCE: Alert, overweight female seen.  Up in w/c.  Well groomed. .  Able to  express self verbally. Pale facial color.  HEAD:  Normocephalic.  No facial asymmetry..  ENT: Mouth and posterior oropharynx normal, moist mucous membranes, .  EYES: EOM normal, conjunctiva and lids normal. Wearing eyeglasses.  RESP: Frequent loose cough.  Diminished lung sounds bilateral lower lobes, with faint expiratory wheezes in right lung.  Old surgical scar from thyroidectomy visible at base of neck and upper chest..  CV: Irregularly, " irregular rate and rhythm, 2/6 systolic murmur. +2/+3 ankle and pedal edema.  MUSC: Slight pain with palpation of lumbar spine and with ROM of knees. Purposeful movement of arms without pain. Implanted pain stimulator palpable in right upper buttock.  ABDOMEN: Large, rounded abdomen. BS's positive all 4 quadrants. No discomfort with palpation of abdomen including suprapubic area.  SKIN:  2 cm scab from Bx site on left lower posterior leg. .   NEURO:  Alert and oriented X3.  Purposeful movement of extremities without focal weakness, although legs are weaker than arms due to deconditioning. No tremors or cogwheeling.  Strong hand grasp bilaterally.  PSYCH: Mood is somewhat flat per baseline, but is interactive and grateful for visit.  Fo No anxiety    Recent Labs:    CBC RESULTS:   Recent Labs   Lab Test 10/19/17 10/05/17   09/21/13   1455   06/01/11   1845   WBC  8.8  9.3   < >  10.9   --    --    RBC  4.35  4.35   < >  4.77   --    --    HGB  13.3  13.2   < >  14.9   --   15.0   HCT  42.6  42.1   < >  43.1   --   49.2   MCV  97.9  96.8   < >  90   --   94.1   MCH   --    --    --   31.2   --   28.7   MCHC   --    --    --   34.6   --   30.5*   RDW  13.1  13.4   < >  12.7   --   13.7   PLT  201  153   < >  128*   < >   --     < > = values in this interval not displayed.       Last Basic Metabolic Panel:  Recent Labs   Lab Test 10/19/17 08/21/17   NA  139  141   POTASSIUM  4.1  4.2   CHLORIDE  97*  101   KURTIS  9.9  10.0   CO2  32*  32*   BUN  18  19   CR  0.78  0.67   GLC  216*  116*       GFR Estimate   Date Value Ref Range Status   10/19/2017 >60 >60 ml/min/1.73m2 Final   08/21/2017 >60 >60 mL/min/1.73m2 Final   07/14/2017 >60 >60 ml/min/1.73m2 Final   04/17/2017 >60 >60 mL/min/1.73m2 Final   02/02/2017 >60 >60 ml/min/1.73m2 Final     Lab Results   Component Value Date    A1C 6.0 08/21/2017    A1C 5.8 02/02/2017    A1C 5.7 08/24/2016    A1C 5.8 04/14/2016    A1C 5.9 12/17/2015       Assessment/Plan:  (J20.9) Acute  bronchitis with symptoms > 10 days  (primary encounter diagnosis)  Comment: Ongoing with no improvement and ongoing copious mucous production.  Reactive airway process  Plan: Pertussis culture. Will start Zithromax due to length of illness with no improvement and comorbid conditions increasing her risk for infection.  Continue to monitor carefully.    (I49.9) Irregular heart beat  Comment: Likely atrial fibrillation caused by ongoing respiratory illness.  Plan: STAT chest XRay.  On ASA qd.  Monitor rate.  If cardiac rate >100/min, will need to start beta blocker.    (G47.33) JAMES (obstructive sleep apnea)  Comment: Long standing, but refuses CPAP, putting her at risk for respiratory infections, atrial fibrillation and CHF.  Plan: Monitor.    (E11.9) Type 2 diabetes mellitus without complication, without long-term current use of insulin (H)  Comment: Chronic  Plan: Due to prednisone and illness, will increase frequency of monitoring of accuchecks.    (R60.0) Localized edema  Comment: Increased with weight gain.  Cannot r/o CHF due to possible new onset CHF.  Plan: Give additional Lasix 40 mg today and tomorrow and additional 20 meq of KCL.  Monitor weights before breakfast and repeat BMP on Monday    Total time spent with patient visit at the [facility] nursing facility was 35 min including patient visit and review of past records. Greater than 50% of total time spent with counseling and coordinating care due to respiratory illness    Electronically signed by  SABINE Borjas CNP

## 2017-10-20 ENCOUNTER — NURSING HOME VISIT (OUTPATIENT)
Dept: GERIATRICS | Facility: CLINIC | Age: 82
End: 2017-10-20
Payer: COMMERCIAL

## 2017-10-20 VITALS
WEIGHT: 234 LBS | OXYGEN SATURATION: 91 % | RESPIRATION RATE: 22 BRPM | TEMPERATURE: 98.4 F | BODY MASS INDEX: 37.61 KG/M2 | DIASTOLIC BLOOD PRESSURE: 69 MMHG | SYSTOLIC BLOOD PRESSURE: 102 MMHG | HEART RATE: 112 BPM | HEIGHT: 66 IN

## 2017-10-20 DIAGNOSIS — R07.81 RIB PAIN ON LEFT SIDE: ICD-10-CM

## 2017-10-20 DIAGNOSIS — G47.33 OSA ON CPAP: Chronic | ICD-10-CM

## 2017-10-20 DIAGNOSIS — J20.9 ACUTE BRONCHITIS WITH SYMPTOMS > 10 DAYS: Primary | ICD-10-CM

## 2017-10-20 DIAGNOSIS — I48.91 ATRIAL FIBRILLATION, NEW ONSET (H): ICD-10-CM

## 2017-10-20 DIAGNOSIS — R60.9 DEPENDENT EDEMA: ICD-10-CM

## 2017-10-20 PROBLEM — I48.20 CHRONIC ATRIAL FIBRILLATION (H): Status: ACTIVE | Noted: 2017-10-20

## 2017-10-20 PROCEDURE — 99310 SBSQ NF CARE HIGH MDM 45: CPT | Performed by: NURSE PRACTITIONER

## 2017-10-20 RX ORDER — METOPROLOL TARTRATE 25 MG/1
12.5 TABLET, FILM COATED ORAL 2 TIMES DAILY
Qty: 90 TABLET | Refills: 3 | Status: SHIPPED | OUTPATIENT
Start: 2017-10-20 | End: 2018-01-01

## 2017-10-20 RX ORDER — LEVALBUTEROL INHALATION SOLUTION 0.63 MG/3ML
1 SOLUTION RESPIRATORY (INHALATION) EVERY 8 HOURS
Qty: 360 ML
Start: 2017-10-20 | End: 2017-10-23

## 2017-10-20 RX ORDER — BUDESONIDE 1 MG/2ML
1 INHALANT ORAL 2 TIMES DAILY
Start: 2017-10-20 | End: 2017-12-14

## 2017-10-20 RX ORDER — PREDNISONE 1 MG/1
TABLET ORAL
Start: 2017-10-20 | End: 2017-10-23

## 2017-10-20 NOTE — PROGRESS NOTES
"[facility]    Chief Complaint   Patient presents with     RECHECK     HPI:    Mireille Taylor is a 88 year old  (8/2/1929), who is being seen today for an episodic care visit at [facility].  HPI information obtained from: facility chart records, facility staff, patient report and [facility] chart review.Today's concern is:  Acute bronchitis with symptoms > 10 days  Chest XRay completed early in illness, which was negative for pneumonia and was repeated againyesterday.  Chest XRay showed that the study was limited by technical factors including reduced inspiration and obesity.  But did not show any overt consolidation or pleural fluid. Labs yesterday showed a normal WBC, but slightly altered Chloride and CO2. Started on Zithromax yesterday.  Remains on prednisone and dubonebs.  Now with new onset atrial fibrillation confirmed by EKG last evening.  Has been afebrile and O2 sats >90%.  Continues to produce thick clear to white mucous that at times is thick enough to make her feel that she will throw up.  Also now with left rib pain from coughing. Pertussis swab pending.    Atrial fibrillation, new onset (H)  Likely cause is her lengthy respiratory illness that has not improved.  Today pulse is 112/min.  Pt denies chest pain or palpitations.  Has been on duonebs for respiratory infection.  Weight today shows a 10 lbs weight gain in one month with increase of LE edema.  No reports of heart failure in her medical history. Received increased lasix last night and this morning. CHAds-Vasc2 score of 3 and a Has- bled score of 2. Discussed anticoagulation with patient and reviewed above scores.  She does not want coumadin and prefers ASA for anticoagulation.  She understands that this is not as effective as coumadin for reducing stroke risk.  She reports that \"I am not afraid of dying\".  Desires comfort to be goal of care and no hospitalization.    JAMES on CPAP  Pt has been " refusing her CPAP device for her sleep apnea.     Rib pain on left side  Developed following lengthy respiratory illness with cough.  She is using her prn oxycodone and ice packs for relief.    Dependent edema  Chronic issue with use of daily lasix.  Increased lasix dose last night and this morning due to her new atrial fibrillation and weight gain.        ALLERGIES: Bactrim [sulfamethoxazole w-trimethoprim]; Gabapentin; Lyrica; Methadone; Nsaids; Penicillin g; Tramadol; Cephalexin hcl; Clindamycin hcl; and Macrobid [nitrofurantoin]  Past Medical, Surgical, Family and Social History reviewed and updated in [facility].    Current Outpatient Prescriptions   Medication Sig Dispense Refill     PREDNISONE PO Take 10 mg by mouth daily       ipratropium - albuterol 0.5 mg/2.5 mg/3 mL (DUONEB) 0.5-2.5 (3) MG/3ML neb solution Take 1 vial by nebulization 4 times daily       ipratropium - albuterol 0.5 mg/2.5 mg/3 mL (DUONEB) 0.5-2.5 (3) MG/3ML neb solution Take 1 vial by nebulization every 4 hours as needed for shortness of breath / dyspnea or wheezing       azithromycin (ZITHROMAX) 250 MG tablet Two tablets first day, then one tablet daily for four days. 6 tablet      chlorhexidine (PERIDEX) 0.12 % solution Swish and spit in mouth 2 times daily Rinse with 1/2 oz BID for 30 secs and expectorate       estradiol (ESTRACE VAGINAL) 0.1 MG/GM cream Place 2 g vaginally three times a week 42.5 g      Cranberry (CRANBERRY CONCENTRATE) 500 MG CAPS Take 1 capsule (500 mg) by mouth 2 times daily 90 capsule      VITAMIN D, CHOLECALCIFEROL, PO Take 2,000 Units by mouth daily       Selenium Sulfide 2.25 % SHAM Externally apply 1 Application topically twice a week       DiazePAM (VALIUM PO) Take 2.5 mg by mouth At Bedtime And q8h prn. Do not give more than 3 doses in 24 hrs,  Including scheduled dose.       Multiple Vitamins-Minerals (PRESERVISION AREDS 2 PO) Take 1 tablet by mouth 2 times daily       Pseudoephedrine-DM-GG (ROBITUSSIN CF PO) Take  5 mLs by mouth as needed       Acetaminophen (TYLENOL PO) Take 650 mg by mouth 4 times daily       polyethylene glycol (MIRALAX/GLYCOLAX) powder Take 17 g by mouth daily as needed for constipation 119 g      oxyCODONE (ROXICODONE) 5 MG immediate release tablet 2.5 mg am and HS and q6h prn pain.  Check with pt before administering morning dose if she has had a prn overnight to see if she would like to hold the morning dose.  0     metFORMIN (GLUCOPHAGE) 500 MG tablet Take 1 tablet (500 mg) by mouth 2 times daily (with meals) 180 tablet 1     pantoprazole (PROTONIX) 40 MG enteric coated tablet Take 1 tablet (40 mg) by mouth daily Take 30-60 minutes before a meal. 180 tablet 3     levothyroxine (SYNTHROID, LEVOTHROID) 125 MCG tablet Take 1 tablet (125 mcg) by mouth daily 90 tablet 1     sodium chloride (CVS SALINE NASAL SPRAY) 0.65 % nasal spray Spray 2 sprays into both nostrils 2 times daily       aspirin 81 MG EC tablet Take 1 tablet (81 mg) by mouth daily 90 tablet 3     Ascorbic Acid 1000 MG TABS Take 1,000 mg by mouth daily       furosemide (LASIX) 20 MG tablet Take 1 tablet (20 mg) by mouth daily 90 tablet 4     citalopram (CELEXA) 40 MG tablet TAKE 1 TABLET BY MOUTH DAILY 90 tablet 1     potassium chloride SA (K-DUR,KLOR-CON M) 10 MEQ tablet TAKE ONE TABLET BY MOUTH DAILY 90 tablet 1     methenamine hippurate (HIPREX) 1 G TABS TAKE 1 TABLET BY MOUTH ONCE A DAY.  TAKE ALONG WITH 1000MG VITAMIN-C 180 tablet 4     mirabegron (MYRBETRIQ) 50 MG 24 hr tablet Take 1 tablet (50 mg) by mouth daily 30 tablet 11     Medications reviewed:  Medications reconciled to facility chart and changes were made to reflect current medications as identified as above med list. Below are the changes that were made:   Medications stopped since last EPIC medication reconciliation:   There are no discontinued medications.    Medications started since last [facility] medication reconciliation:  No orders of the defined types were placed in this  "encounter.    Patient Active Problem List   Diagnosis     GERD (gastroesophageal reflux disease)     Systolic murmur     CARDIOVASCULAR SCREENING; LDL GOAL LESS THAN 100     JAMES on CPAP     Osteoporosis     Peripheral neuropathy     Anxiety     Vitamin D deficiency     Mixed incontinence urge and stress     Health Care Home     Frequent UTI, Dr Bigg Muhammad, Urologist     Candidiasis of skin     Dependent edema     Benzodiazepine dependence, pt reports daily use of valium since the 1930's.     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     Chronic low back pain with bilateral sciatica     Macular degeneration (senile) of retina     Recurrent major depressive disorder, in partial remission (H)     Seborrheic dermatitis     Morbid obesity (H)     BMI 35.0-35.9,adult     Leg skin lesion, left     Chronic atrial fibrillation (H)       REVIEW OF SYSTEMS:  Negative selected, CONSTITUTIONAL: weight loss, weight gain, poor appetite and fevers, EYES: tearing, redness and Positive for eyeglasses and macular degeneration.  Followed by Dr. Carlson ENT: [place], dysphagia and positive for h/o epistaxis,  CV: chest pain and Positive for dependent edema and now weight gain with irregular heartrate.., RESPIRATORY: , Positive for productive cough, crackles and wheezing, : Positive for frequent UTI's and dribble incontinence. Has implanted stimulator for bladder control.... GI: N&V.  Positive for occasional loose stools and constipation.  H/o occ rectal bleeding.. NEURO: headaches and Positive for variable memory issues - son reports it is dependent on how much valium and oxycodone she has taken & pt agrees and MUSCULOSKELETAL: Positive for chronic back pain and now left rib pain.    Physical Exam:  /69  Pulse 112  Temp 98.4  F (36.9  C)  Resp 22  Ht 5' 6\" (1.676 m)  Wt 234 lb (106.1 kg)  SpO2 91%  BMI 37.77 " kg/m2  GENERAL APPEARANCE: Alert, overweight female seen.  Up in w/c.  Well groomed. .  Able to  express self verbally. Pale facial color.  HEAD:  Normocephalic.  No facial asymmetry..  ENT: Mouth and posterior oropharynx normal, moist mucous membranes, .  EYES: EOM normal, conjunctiva and lids normal. Wearing eyeglasses.  RESP: Frequent loose productive cough.  Diminished lung sounds bilateral lower lobes, with crackles at bases and expiratory wheezes in right lung.  Old surgical scar from thyroidectomy visible at base of neck and upper chest..  CV: Irregularly, irregular rate and rhythm, 2/6 systolic murmur. +2/+3 ankle and pedal edema.  MUSC: Slight pain with palpation of lumbar spine and with ROM of knees. Purposeful movement of arms without pain. Implanted pain stimulator palpable in right upper buttock.  Left lower anterior rib pain with any cough.  ABDOMEN: Large, rounded abdomen. BS's positive all 4 quadrants. No discomfort with palpation of abdomen including suprapubic area.  SKIN:  2 cm scab from Bx site on left lower posterior leg. .   NEURO:  Alert and oriented X3.  Purposeful movement of extremities without focal weakness, although legs are weaker than arms due to deconditioning. No tremors or cogwheeling.  Strong hand grasp bilaterally.    Recent Labs:    CBC RESULTS:   Recent Labs   Lab Test 10/19/17 10/05/17   09/21/13   1455   06/01/11   1845   WBC  8.8  9.3   < >  10.9   --    --    RBC  4.35  4.35   < >  4.77   --    --    HGB  13.3  13.2   < >  14.9   --   15.0   HCT  42.6  42.1   < >  43.1   --   49.2   MCV  97.9  96.8   < >  90   --   94.1   MCH   --    --    --   31.2   --   28.7   MCHC   --    --    --   34.6   --   30.5*   RDW  13.1  13.4   < >  12.7   --   13.7   PLT  201  153   < >  128*   < >   --     < > = values in this interval not displayed.       Last Basic Metabolic Panel:  Recent Labs   Lab Test 10/19/17 08/21/17   NA  139  141   POTASSIUM  4.1  4.2   CHLORIDE  97*  101   KURTIS  9.9   10.0   CO2  32*  32*   BUN  18  19   CR  0.78  0.67   GLC  216*  116*       GFR Estimate   Date Value Ref Range Status   10/19/2017 >60 >60 ml/min/1.73m2 Final   08/21/2017 >60 >60 mL/min/1.73m2 Final   07/14/2017 >60 >60 ml/min/1.73m2 Final   04/17/2017 >60 >60 mL/min/1.73m2 Final   02/02/2017 >60 >60 ml/min/1.73m2 Final     Family Communication:  Discussed current status [place] to call to discuss status.    Dr. Leon updated and consulted.    Assessment/Plan:  (J20.9) Acute bronchitis with symptoms > 10 days  (primary encounter diagnosis)  Comment: No improvement after 2 weeks of conservative care with nebs and steroids, now on antibiotic. Now with reactive airway disease and likely bronchospasm  Plan: Continue antibiotic.  Start budesonide nebs bid.  Taper off prednisone. Pertussis swab pending.  Change nebs to xopenex due to new atrial fibrillation.  Monitor carefully.    (I48.91) Atrial fibrillation, new onset (H)  Comment: Likely cause is 2 week respiratory illness.  Plan: Start metoprolol bid.  Pt's choice is to continue ASA for anticoagulation vs coumadin.  Portable echocardiogram today to determine if she has CHF. Monitor VS's bid     (G47.33,  Z99.89) JAMES on CPAP  Comment: Chronic increasing her risk of heart disease and respiratory illnesses  Plan: Pt continues to refuse CPAP.      (R07.81) Rib pain on left side  Comment: Secondary to coughing  Plan: Continue tylenol and oxycodone with supportive care with ice/warm packs for comfort.    (R60.9) Dependent edema  Comment: Also with weight gain  Plan: Due to new onset atrial fibrillation, may need ongoing increase of lasix.  Reevaluate on Monday.    Electronically signed by  SABINE Borjas CNP

## 2017-10-23 ENCOUNTER — TRANSFERRED RECORDS (OUTPATIENT)
Dept: HEALTH INFORMATION MANAGEMENT | Facility: CLINIC | Age: 82
End: 2017-10-23

## 2017-10-23 ENCOUNTER — NURSING HOME VISIT (OUTPATIENT)
Dept: GERIATRICS | Facility: CLINIC | Age: 82
End: 2017-10-23
Payer: COMMERCIAL

## 2017-10-23 VITALS
DIASTOLIC BLOOD PRESSURE: 77 MMHG | TEMPERATURE: 97.3 F | BODY MASS INDEX: 37.61 KG/M2 | HEART RATE: 100 BPM | HEIGHT: 66 IN | RESPIRATION RATE: 18 BRPM | WEIGHT: 234 LBS | OXYGEN SATURATION: 92 % | SYSTOLIC BLOOD PRESSURE: 121 MMHG

## 2017-10-23 DIAGNOSIS — E11.9 TYPE 2 DIABETES MELLITUS WITHOUT COMPLICATION, WITHOUT LONG-TERM CURRENT USE OF INSULIN (H): ICD-10-CM

## 2017-10-23 DIAGNOSIS — I50.9 ACUTE CONGESTIVE HEART FAILURE, UNSPECIFIED CONGESTIVE HEART FAILURE TYPE: ICD-10-CM

## 2017-10-23 DIAGNOSIS — J20.9 ACUTE BRONCHITIS WITH SYMPTOMS > 10 DAYS: Primary | ICD-10-CM

## 2017-10-23 DIAGNOSIS — I48.20 CHRONIC ATRIAL FIBRILLATION (H): ICD-10-CM

## 2017-10-23 DIAGNOSIS — S20.212D CONTUSION OF RIB ON LEFT SIDE, SUBSEQUENT ENCOUNTER: ICD-10-CM

## 2017-10-23 DIAGNOSIS — R60.9 DEPENDENT EDEMA: ICD-10-CM

## 2017-10-23 LAB
BUN SERPL-MCNC: 19 MG/DL (ref 9–26)
CALCIUM SERPL-MCNC: 9.7 MG/DL (ref 8.4–10.2)
CHLORIDE SERPLBLD-SCNC: 99 MMOL/L (ref 98–109)
CO2 SERPL-SCNC: 32 MMOL/L (ref 22–31)
CREAT SERPL-MCNC: 0.83 MG/DL (ref 0.55–1.02)
EJECTION FRACTION: 58
GFR SERPL CREATININE-BSD FRML MDRD: >60 ML/MIN/1.73M2
GLUCOSE SERPL-MCNC: 122 MG/DL (ref 70–100)
POTASSIUM SERPL-SCNC: 4.4 MMOL/L (ref 3.5–5.2)
SODIUM SERPL-SCNC: 140 MMOL/L (ref 136–145)

## 2017-10-23 PROCEDURE — 99310 SBSQ NF CARE HIGH MDM 45: CPT | Performed by: NURSE PRACTITIONER

## 2017-10-23 RX ORDER — LEVALBUTEROL INHALATION SOLUTION 0.63 MG/3ML
1 SOLUTION RESPIRATORY (INHALATION) EVERY 8 HOURS PRN
COMMUNITY
Start: 2017-10-21 | End: 2017-11-06

## 2017-10-23 RX ORDER — POTASSIUM CHLORIDE 750 MG/1
20 TABLET, EXTENDED RELEASE ORAL DAILY
Qty: 90 TABLET | Refills: 1
Start: 2017-10-23 | End: 2018-01-01

## 2017-10-23 RX ORDER — FUROSEMIDE 20 MG
40 TABLET ORAL 2 TIMES DAILY
Qty: 90 TABLET | Refills: 4
Start: 2017-10-23 | End: 2018-01-01

## 2017-10-23 NOTE — PROGRESS NOTES
[facility]    Chief Complaint   Patient presents with     RECHECK     HPI:    Mireille Taylor is a 88 year old  (8/2/1929), who is being seen today for an episodic care visit at [facility].  HPI information obtained from: facility chart records, facility staff, patient report and [facility] chart review.Today's concern is:  Acute bronchitis with symptoms > 10 days  Pt now on Xopenex and budesonide nebs.  Has been tapering off oral prednisone. Also on Zithromax due to length of time of illness.  She reports that she is starting to feel better.   Cough remains loose and productive, but less frequent.  Pertussis swab negative.  Afebrile and O2 sats >90% on oxygen.  Chest XRay on 10/19 was inconclusive due to technical limitations. Continues to report chronic SOB.    Acute congestive heart failure, unspecified congestive heart failure type (H)  Not confirmed by chest XRay, but result was technically limited. Weight gain of 10 lbs in past month.  Lasix increased last Thursday and Friday.  Todays' weight of 234 is unchanged from Friday. Echocardiogram pending today.    Contusion of rib on left side, subsequent encounter  Due to her coughing, she has significant left lower rib pain.  Pt is supported her ribs with cough and has increased the use of her prn oxycodone.  XRays did not confirm fx.    Type 2 diabetes mellitus without complication, without long-term current use of insulin (H)  Accuchecks increased to bid for one week due to prednisone use.  0730: 127-158, 5pm: 169-273.    Chronic atrial fibrillation  New onset with current illness. Confirmed by EKG. Metoprolol started on 10/20 due to RVR new atrial fibrillation:  Pulse ranges following start:  with BP ranges of 108-123/71-81.  Other than rib pain, pt denies other chest pain or palpitations.    ALLERGIES: Bactrim [sulfamethoxazole w-trimethoprim]; Gabapentin; Lyrica; Methadone; Nsaids; Penicillin g; Tramadol;  Cephalexin hcl; Clindamycin hcl; and Macrobid [nitrofurantoin]  Past Medical, Surgical, Family and Social History reviewed and updated in EPIC.    Current Outpatient Prescriptions   Medication Sig Dispense Refill     PREDNISONE PO Take 2 mg by mouth daily       levalbuterol (XOPENEX) 0.63 MG/3ML neb solution Take 1 ampule by nebulization every 8 hours as needed for shortness of breath / dyspnea or wheezing       budesonide (PULMICORT) 1 MG/2ML SUSP neb solution Take 2 mLs (1 mg) by nebulization 2 times daily       metoprolol (LOPRESSOR) 25 MG tablet Take 0.5 tablets (12.5 mg) by mouth 2 times daily Hold for systolic BP <100 or pulse <60. 90 tablet 3     chlorhexidine (PERIDEX) 0.12 % solution Swish and spit in mouth 2 times daily Rinse with 1/2 oz BID for 30 secs and expectorate       estradiol (ESTRACE VAGINAL) 0.1 MG/GM cream Place 2 g vaginally three times a week 42.5 g      Cranberry (CRANBERRY CONCENTRATE) 500 MG CAPS Take 1 capsule (500 mg) by mouth 2 times daily 90 capsule      VITAMIN D, CHOLECALCIFEROL, PO Take 2,000 Units by mouth daily       Selenium Sulfide 2.25 % SHAM Externally apply 1 Application topically twice a week       DiazePAM (VALIUM PO) Take 2.5 mg by mouth At Bedtime And q8h prn. Do not give more than 3 doses in 24 hrs,  Including scheduled dose.       Multiple Vitamins-Minerals (PRESERVISION AREDS 2 PO) Take 1 tablet by mouth 2 times daily       Pseudoephedrine-DM-GG (ROBITUSSIN CF PO) Take 5 mLs by mouth 4 times daily as needed        Acetaminophen (TYLENOL PO) Take 650 mg by mouth 4 times daily       polyethylene glycol (MIRALAX/GLYCOLAX) powder Take 17 g by mouth daily as needed for constipation 119 g      oxyCODONE (ROXICODONE) 5 MG immediate release tablet 2.5 mg am and HS and q6h prn pain.  Check with pt before administering morning dose if she has had a prn overnight to see if she would like to hold the morning dose.  0     metFORMIN (GLUCOPHAGE) 500 MG tablet Take 1 tablet (500 mg)  by mouth 2 times daily (with meals) 180 tablet 1     pantoprazole (PROTONIX) 40 MG enteric coated tablet Take 1 tablet (40 mg) by mouth daily Take 30-60 minutes before a meal. 180 tablet 3     levothyroxine (SYNTHROID, LEVOTHROID) 125 MCG tablet Take 1 tablet (125 mcg) by mouth daily 90 tablet 1     sodium chloride ([employer]S SALINE NASAL SPRAY) 0.65 % nasal spray Spray 2 sprays into both nostrils 2 times daily       aspirin 81 MG EC tablet Take 1 tablet (81 mg) by mouth daily 90 tablet 3     Ascorbic Acid 1000 MG TABS Take 1,000 mg by mouth daily       furosemide (LASIX) 20 MG tablet Take 1 tablet (20 mg) by mouth daily 90 tablet 4     citalopram (CELEXA) 40 MG tablet TAKE 1 TABLET BY MOUTH DAILY 90 tablet 1     potassium chloride SA (K-DUR,KLOR-CON M) 10 MEQ tablet TAKE ONE TABLET BY MOUTH DAILY 90 tablet 1     methenamine hippurate (HIPREX) 1 G TABS TAKE 1 TABLET BY MOUTH ONCE A DAY.  TAKE ALONG WITH 1000MG VITAMIN-C 180 tablet 4     mirabegron (MYRBETRIQ) 50 MG 24 hr tablet Take 1 tablet (50 mg) by mouth daily 30 tablet 11     [DISCONTINUED] levalbuterol (XOPENEX) 0.63 MG/3ML neb solution Take 3 mLs (0.63 mg) by nebulization every 8 hours 360 mL      Medications reviewed:  Medications reconciled to facility chart and changes were made to reflect current medications as identified as above med list. Below are the changes that were made:   Medications stopped since last EPIC medication reconciliation:   There are no discontinued medications.    Medications started since last [facility] medication reconciliation:  No orders of the defined types were placed in this encounter.    Patient Active Problem List   Diagnosis     GERD (gastroesophageal reflux disease)     Systolic murmur     CARDIOVASCULAR SCREENING; LDL GOAL LESS THAN 100     JAMES on CPAP     Osteoporosis     Peripheral neuropathy     Anxiety     Vitamin D deficiency     Mixed incontinence urge and stress     Health Care Home     Frequent UTI, Dr Bigg Muhammad,  "Urologist     Candidiasis of skin     Dependent edema     Benzodiazepine dependence, pt reports daily use of valium since the 1930's.     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     Chronic low back pain with bilateral sciatica     Macular degeneration (senile) of retina     Recurrent major depressive disorder, in partial remission (H)     Seborrheic dermatitis     Morbid obesity (H)     BMI 35.0-35.9,adult     Leg skin lesion, left     Chronic atrial fibrillation (H)     REVIEW OF SYSTEMS:  Negative selected, CONSTITUTIONAL: weight loss, weight gain, poor appetite and fevers, EYES: tearing, redness and Positive for eyeglasses and macular degeneration.  Followed by Dr. Carlson ENT: [place], dysphagia and positive for h/o epistaxis,  CV: chest pain and Positive for dependent edema and now weight gain with irregular heartrate.., RESPIRATORY: , Positive for productive cough, crackles and wheezing, : Positive for frequent UTI's and dribble incontinence. Has implanted stimulator for bladder control.... GI: N&V.  Positive for occasional loose stools and constipation.  H/o occ rectal bleeding.. NEURO: headaches and Positive for variable memory issues - son reports it is dependent on how much valium and oxycodone she has taken & pt agrees and MUSCULOSKELETAL: Positive for chronic back pain and now left rib pain.    Physical Exam:  /77  Pulse 100  Temp 97.3  F (36.3  C)  Resp 18  Ht 5' 6\" (1.676 m)  Wt 234 lb (106.1 kg)  SpO2 92%  BMI 37.77 kg/m2  GENERAL APPEARANCE: Alert, overweight female seen.  Up in w/c.  Well groomed. .  Able to  express self verbally. Pale facial color. Some forgetfulness of past details.  HEAD:  Normocephalic.  No facial asymmetry..  ENT: Mouth and posterior oropharynx normal, moist mucous membranes, .  EYES: EOM normal, conjunctiva and lids normal. Wearing eyeglasses.  RESP: " Loose productive cough, less frequent than 72 hours ago..  Diminished lung sounds bilateral lower lobes, with crackles at bases, but no wheezes or rhonchi today.  Old surgical scar from thyroidectomy visible at base of neck and upper chest..  CV: Irregularly, irregular rate and rhythm, 2/6 systolic murmur. +2/+3 ankle and pedal edema.  MUSC: Slight pain with palpation of lumbar spine and with ROM of knees. Purposeful movement of arms without pain. Implanted pain stimulator palpable in right upper buttock.  Left lower anterior rib pain with any cough.  ABDOMEN: Large, rounded abdomen. BS's positive all 4 quadrants. No discomfort with palpation of abdomen including suprapubic area.  NEURO:  Alert and oriented X3.  Purposeful movement of extremities without focal weakness, although legs are weaker than arms due to deconditioning. No tremors or cogwheeling.  Strong hand grasp bilaterally.    Recent Labs:    CBC RESULTS:   Recent Labs   Lab Test 10/19/17 10/05/17   09/21/13   1455   06/01/11   1845   WBC  8.8  9.3   < >  10.9   --    --    RBC  4.35  4.35   < >  4.77   --    --    HGB  13.3  13.2   < >  14.9   --   15.0   HCT  42.6  42.1   < >  43.1   --   49.2   MCV  97.9  96.8   < >  90   --   94.1   MCH   --    --    --   31.2   --   28.7   MCHC   --    --    --   34.6   --   30.5*   RDW  13.1  13.4   < >  12.7   --   13.7   PLT  201  153   < >  128*   < >   --     < > = values in this interval not displayed.       Last Basic Metabolic Panel:  Recent Labs   Lab Test 10/19/17 08/21/17   NA  139  141   POTASSIUM  4.1  4.2   CHLORIDE  97*  101   KURTIS  9.9  10.0   CO2  32*  32*   BUN  18  19   CR  0.78  0.67   GLC  216*  116*     GFR Estimate   Date Value Ref Range Status   10/19/2017 >60 >60 ml/min/1.73m2 Final   08/21/2017 >60 >60 mL/min/1.73m2 Final   07/14/2017 >60 >60 ml/min/1.73m2 Final   04/17/2017 >60 >60 mL/min/1.73m2 Final   02/02/2017 >60 >60 ml/min/1.73m2 Final     Lab Results   Component Value Date    A1C 6.0  08/21/2017    A1C 5.8 02/02/2017    A1C 5.7 08/24/2016    A1C 5.8 04/14/2016    A1C 5.9 12/17/2015       10/20/17:  Pertussis swab: No pertussis detected  10/19/17:  EKG:  Atrial Fibrillation  10/19/17:  Chest XRay:  IMPRESSION:  Technically limited exam, without definite acute process.    Assessment/Plan:  (J20.9) Acute bronchitis with symptoms > 10 days  (primary encounter diagnosis)  Comment: Some improvement  Plan: Continue current POC and monitoring.    (I50.9) Acute congestive heart failure, unspecified congestive heart failure type (H)  Comment: Ongoing  Plan:Increase lasix to 40 mg bid (beginning today) and increase lasix to 20 meq a day. BMP pending today and repeat on Thursday.  Weights before breakfast Mon, Wed, Friday.  Reevaluate on Thursday.  Echocardiogram pending today.     (S20.689D) Contusion of rib on left side, subsequent encounter  Comment: Ongoing  Plan: Continue current POC.    (E11.9) Type 2 diabetes mellitus without complication, without long-term current use of insulin (H)  Comment: Chronic but controlled  Plan: Continue current POC.     (I48.2) Chronic atrial fibrillation (H)  Comment: Ongoing, with ventricular rate around 100  Plan: Continue metoprolol and monitor for lowered rate as cough and CHF symptoms improve.    Total time spent with patient visit at the skilled nursing facility was 35 min including patient visit and review of past records. Greater than 50% of total time spent with counseling and coordinating care due to CHF and bronchitis    Electronically signed by  SABINE Borjas CNP

## 2017-10-26 ENCOUNTER — TRANSFERRED RECORDS (OUTPATIENT)
Dept: HEALTH INFORMATION MANAGEMENT | Facility: CLINIC | Age: 82
End: 2017-10-26

## 2017-10-26 LAB
BUN SERPL-MCNC: 16 MG/DL (ref 9–26)
CALCIUM SERPL-MCNC: 10 MG/DL (ref 8.4–10.2)
CHLORIDE SERPLBLD-SCNC: 98 MMOL/L (ref 98–109)
CO2 SERPL-SCNC: 36 MMOL/L (ref 22–31)
CREAT SERPL-MCNC: 0.73 MG/DL (ref 0.55–1.02)
GFR SERPL CREATININE-BSD FRML MDRD: >60 ML/MIN/1.73M2
GLUCOSE SERPL-MCNC: 148 MG/DL (ref 70–100)
POTASSIUM SERPL-SCNC: 4.1 MMOL/L (ref 3.5–5.2)
SODIUM SERPL-SCNC: 142 MMOL/L (ref 136–145)

## 2017-11-06 ENCOUNTER — NURSING HOME VISIT (OUTPATIENT)
Dept: GERIATRICS | Facility: CLINIC | Age: 82
End: 2017-11-06
Payer: COMMERCIAL

## 2017-11-06 VITALS
DIASTOLIC BLOOD PRESSURE: 90 MMHG | HEART RATE: 101 BPM | SYSTOLIC BLOOD PRESSURE: 138 MMHG | RESPIRATION RATE: 20 BRPM | WEIGHT: 232 LBS | BODY MASS INDEX: 37.45 KG/M2 | TEMPERATURE: 98.1 F

## 2017-11-06 DIAGNOSIS — L98.9 LEG SKIN LESION, LEFT: ICD-10-CM

## 2017-11-06 DIAGNOSIS — L98.9 SKIN LESION: ICD-10-CM

## 2017-11-06 DIAGNOSIS — J45.30 MILD PERSISTENT REACTIVE AIRWAY DISEASE WITHOUT COMPLICATION: Primary | ICD-10-CM

## 2017-11-06 PROCEDURE — 99309 SBSQ NF CARE MODERATE MDM 30: CPT | Performed by: NURSE PRACTITIONER

## 2017-11-06 RX ORDER — LEVALBUTEROL INHALATION SOLUTION 0.63 MG/3ML
1 SOLUTION RESPIRATORY (INHALATION) EVERY 8 HOURS PRN
Qty: 360 ML
Start: 2017-11-06 | End: 2018-01-01

## 2017-11-06 NOTE — PROGRESS NOTES
[facility]    Chief Complaint   Patient presents with     Nursing Home Acute       HPI:    Mireille Taylor is a 88 year old  (8/2/1929), who is being seen today for an episodic care visit at [facility].  HPI information obtained from: facility chart records, facility staff, patient report and [facility] chart review.Today's concern is:  Mild persistent reactive airway disease without complication  Pt had lengthy illness with cough that eventually improved with ABX, pulmicort nebs and xopenex nebs.  Has completed course of xopenex and nsg reporting increasing cough.  Afebrile and O2 sats >90% on room air.    Leg skin lesion, left  Lesion biopsied on 10/9 by dermatology.  Examined today and sutures remain in place. No pathology from dermatology as yet.    Skin lesion  Punch biopsy completed by dermatology on 10/31.  No results as yet.  Suspicious for BCC      ALLERGIES: Bactrim [sulfamethoxazole w-trimethoprim]; Gabapentin; Lyrica; Methadone; Nsaids; Penicillin g; Tramadol; Cephalexin hcl; Clindamycin hcl; and Macrobid [nitrofurantoin]  Past Medical, Surgical, Family and Social History reviewed and updated in [facility].    Current Outpatient Prescriptions   Medication Sig Dispense Refill     DOXYCYCLINE HYCLATE PO Take 100 mg by mouth 2 times daily       furosemide (LASIX) 20 MG tablet Take 2 tablets (40 mg) by mouth 2 times daily 90 tablet 4     potassium chloride SA (K-DUR/KLOR-CON M) 10 MEQ CR tablet Take 2 tablets (20 mEq) by mouth daily 90 tablet 1     budesonide (PULMICORT) 1 MG/2ML SUSP neb solution Take 2 mLs (1 mg) by nebulization 2 times daily       metoprolol (LOPRESSOR) 25 MG tablet Take 0.5 tablets (12.5 mg) by mouth 2 times daily Hold for systolic BP <100 or pulse <60. 90 tablet 3     chlorhexidine (PERIDEX) 0.12 % solution Swish and spit in mouth 2 times daily Rinse with 1/2 oz BID for 30 secs and expectorate       estradiol (ESTRACE VAGINAL) 0.1 MG/GM cream  Place 2 g vaginally three times a week 42.5 g      Cranberry (CRANBERRY CONCENTRATE) 500 MG CAPS Take 1 capsule (500 mg) by mouth 2 times daily 90 capsule      VITAMIN D, CHOLECALCIFEROL, PO Take 2,000 Units by mouth daily       Selenium Sulfide 2.25 % SHAM Externally apply 1 Application topically twice a week       DiazePAM (VALIUM PO) Take 2.5 mg by mouth At Bedtime And q8h prn. Do not give more than 3 doses in 24 hrs,  Including scheduled dose.       Multiple Vitamins-Minerals (PRESERVISION AREDS 2 PO) Take 1 tablet by mouth 2 times daily       Pseudoephedrine-DM-GG (ROBITUSSIN CF PO) Take 5 mLs by mouth 4 times daily as needed        Acetaminophen (TYLENOL PO) Take 650 mg by mouth 4 times daily       polyethylene glycol (MIRALAX/GLYCOLAX) powder Take 17 g by mouth daily as needed for constipation 119 g      oxyCODONE (ROXICODONE) 5 MG immediate release tablet 2.5 mg am and HS and q6h prn pain.  Check with pt before administering morning dose if she has had a prn overnight to see if she would like to hold the morning dose.  0     metFORMIN (GLUCOPHAGE) 500 MG tablet Take 1 tablet (500 mg) by mouth 2 times daily (with meals) 180 tablet 1     pantoprazole (PROTONIX) 40 MG enteric coated tablet Take 1 tablet (40 mg) by mouth daily Take 30-60 minutes before a meal. 180 tablet 3     levothyroxine (SYNTHROID, LEVOTHROID) 125 MCG tablet Take 1 tablet (125 mcg) by mouth daily 90 tablet 1     sodium chloride (CVS SALINE NASAL SPRAY) 0.65 % nasal spray Spray 2 sprays into both nostrils 2 times daily       aspirin 81 MG EC tablet Take 1 tablet (81 mg) by mouth daily 90 tablet 3     Ascorbic Acid 1000 MG TABS Take 1,000 mg by mouth daily       citalopram (CELEXA) 40 MG tablet TAKE 1 TABLET BY MOUTH DAILY 90 tablet 1     methenamine hippurate (HIPREX) 1 G TABS TAKE 1 TABLET BY MOUTH ONCE A DAY.  TAKE ALONG WITH 1000MG VITAMIN-C 180 tablet 4     mirabegron (MYRBETRIQ) 50 MG 24 hr tablet Take 1 tablet (50 mg) by mouth daily 30  tablet 11     levalbuterol (XOPENEX) 0.63 MG/3ML neb solution Take 1 ampule by nebulization every 8 hours as needed for shortness of breath / dyspnea or wheezing       Medications reviewed:  Medications reconciled to facility chart and changes were made to reflect current medications as identified as above med list. Below are the changes that were made:   Medications stopped since last EPIC medication reconciliation:   There are no discontinued medications.    Medications started since last [facility] medication reconciliation:  Orders Placed This Encounter   Medications     DOXYCYCLINE HYCLATE PO     Sig: Take 100 mg by mouth 2 times daily     Patient Active Problem List   Diagnosis     GERD (gastroesophageal reflux disease)     Systolic murmur     CARDIOVASCULAR SCREENING; LDL GOAL LESS THAN 100     JAMES on CPAP     Osteoporosis     Peripheral neuropathy     Anxiety     Vitamin D deficiency     Mixed incontinence urge and stress     Health Care Home     Frequent UTI, Dr Bigg Muhammad, Urologist     Candidiasis of skin     Dependent edema     Benzodiazepine dependence, pt reports daily use of valium since the 1930's.     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     Chronic low back pain with bilateral sciatica     Macular degeneration (senile) of retina     Recurrent major depressive disorder, in partial remission (H)     Seborrheic dermatitis     Morbid obesity (H)     BMI 35.0-35.9,adult     Leg skin lesion, left     Chronic atrial fibrillation (H)     Reactive airway disease without complication       REVIEW OF SYSTEMS:  Negative selected, CONSTITUTIONAL: weight loss, weight gain, poor appetite and fevers, EYES: tearing, redness and Positive for eyeglasses and macular degeneration.  Followed by Dr. Carlson ENT: [place], dysphagia and positive for h/o epistaxis,  CV: chest pain and Positive for dependent edema  and now weight gain with irregular heartrate.., RESPIRATORY: , Positive for productive cough, crackles and wheezing, : Positive for frequent UTI's and dribble incontinence. Has implanted stimulator for bladder control.... GI: N&V.  Positive for occasional loose stools and constipation.  H/o occ rectal bleeding.. NEURO: headaches and Positive for variable memory issues - son reports it is dependent on how much valium and oxycodone she has taken & pt agrees and MUSCULOSKELETAL: Positive for chronic back pain .    Physical Exam:  /90  Pulse 101  Temp 98.1  F (36.7  C)  Resp 20  Wt 232 lb (105.2 kg)  BMI 37.45 kg/m2     GENERAL APPEARANCE: Alert, overweight female seen.  Resting in bed.  Well groomed. .  Able to  express self verbally. Pale facial color. Some forgetfulness of past details.  HEAD:  Normocephalic.  No facial asymmetry..  ENT: Mouth and posterior oropharynx normal, moist mucous membranes, .  EYES: EOM normal, conjunctiva and lids normal. Wearing eyeglasses.  RESP: Occ harsh non productive cough.  Diminished lung sounds bilateral lower lobes, with crackles at bases, but no wheezes or rhonchi today.  Old surgical scar from thyroidectomy visible at base of neck and upper chest..  CV: Irregularly, irregular rate and rhythm, 2/6 systolic murmur. +2 ankle and pedal edema.  ABDOMEN: Large, rounded abdomen. BS's positive all 4 quadrants. No discomfort with palpation of abdomen including suprapubic area.  SKIN:  Left posterior lower leg has sutures remaining following bx on 10/9 with localized mild edema and redness. Posterior upper right back has punch bx area without surrounding redness.  NEURO:  Alert and oriented X3.  Purposeful movement of extremities without focal weakness, although legs are weaker than arms due to deconditioning. No tremors or cogwheeling.  Strong hand grasp bilaterally.    Recent Labs:   CBC RESULTS:   Recent Labs   Lab Test 10/19/17 10/05/17   09/21/13   1455   06/01/11   5480    WBC  8.8  9.3   < >  10.9   --    --    RBC  4.35  4.35   < >  4.77   --    --    HGB  13.3  13.2   < >  14.9   --   15.0   HCT  42.6  42.1   < >  43.1   --   49.2   MCV  97.9  96.8   < >  90   --   94.1   MCH   --    --    --   31.2   --   28.7   MCHC   --    --    --   34.6   --   30.5*   RDW  13.1  13.4   < >  12.7   --   13.7   PLT  201  153   < >  128*   < >   --     < > = values in this interval not displayed.     Last Basic Metabolic Panel:  Recent Labs   Lab Test 10/26/17 10/23/17   NA  142  140   POTASSIUM  4.1  4.4   CHLORIDE  98  99   KURTIS  10.0  9.7   CO2  36*  32*   BUN  16  19   CR  0.73  0.83   GLC  148*  122*     Liver Function Studies -   Recent Labs   Lab Test  05/23/14   1510  12/13/13   1305   PROTTOTAL  7.0  7.0   ALBUMIN  4.3  3.8   BILITOTAL  0.9  1.0   ALKPHOS  79  85   AST  24  35   ALT  39  46     TSH   Date Value Ref Range Status   02/02/2017 0.60 0.20 - 4.50 mcU/mL Final   02/02/2017 0.60 0.20 - 4.50 ulU/ml Final     Lab Results   Component Value Date    A1C 6.0 08/21/2017    A1C 5.8 02/02/2017     GFR Estimate   Date Value Ref Range Status   10/26/2017 >60 >60 mL/min/1.73m2 Final   10/23/2017 >60 >60 ml/min/1.73m2 Final   10/19/2017 >60 >60 ml/min/1.73m2 Final   08/21/2017 >60 >60 mL/min/1.73m2 Final   07/14/2017 >60 >60 ml/min/1.73m2 Final     Assessment/Plan:  (J45.30) Mild persistent reactive airway disease without complication  (primary encounter diagnosis)  Comment: Recurrent cough  Plan: Restart xopenex nebs for two week and then continue q8h prn.  Monitor.    (L98.9) Leg skin lesion, left  Comment: Sutures remain in place.  Plan: Nsg manager to remove today.  Nsg to contact dermatology for path reports.    (L98.9) Skin lesion  Comment: Right upper back, bx  Plan: Continue local tx and nsg to contact dermatology for path reports.    Electronically signed by  SABINE Borjas CNP

## 2017-11-09 ENCOUNTER — TRANSFERRED RECORDS (OUTPATIENT)
Dept: HEALTH INFORMATION MANAGEMENT | Facility: CLINIC | Age: 82
End: 2017-11-09

## 2017-11-09 LAB
BUN SERPL-MCNC: 23 MG/DL (ref 9–26)
CALCIUM SERPL-MCNC: 10.4 MG/DL (ref 8.4–10.2)
CHLORIDE SERPLBLD-SCNC: 99 MMOL/L (ref 98–109)
CO2 SERPL-SCNC: 35 MMOL/L (ref 22–31)
CREAT SERPL-MCNC: 0.88 MG/DL (ref 0.55–1.02)
GFR SERPL CREATININE-BSD FRML MDRD: 59 ML/MIN/1.73M2
GLUCOSE SERPL-MCNC: 155 MG/DL (ref 70–100)
HEMOGLOBIN: 13.9 G/DL (ref 11.8–15.5)
POTASSIUM SERPL-SCNC: 4.4 MMOL/L (ref 3.5–5.2)
SODIUM SERPL-SCNC: 143 MMOL/L (ref 136–145)

## 2017-11-21 ENCOUNTER — TRANSFERRED RECORDS (OUTPATIENT)
Dept: HEALTH INFORMATION MANAGEMENT | Facility: CLINIC | Age: 82
End: 2017-11-21

## 2017-11-21 LAB
BUN SERPL-MCNC: 16 MG/DL (ref 9–26)
CALCIUM SERPL-MCNC: 10 MG/DL (ref 8.4–10.2)
CHLORIDE SERPLBLD-SCNC: 98 MMOL/L (ref 98–109)
CO2 SERPL-SCNC: 32 MMOL/L (ref 22–31)
CREAT SERPL-MCNC: 0.81 MG/DL (ref 0.55–1.02)
GFR SERPL CREATININE-BSD FRML MDRD: >60 ML/MIN/1.73M2
GLUCOSE SERPL-MCNC: 213 MG/DL (ref 70–100)
POTASSIUM SERPL-SCNC: 4.4 MMOL/L (ref 3.5–5.2)
SODIUM SERPL-SCNC: 139 MMOL/L (ref 136–145)

## 2017-12-14 NOTE — PROGRESS NOTES
[facility]    Chief Complaint   Patient presents with     [facility] Regulatory       HPI:    Mireille Taylor is a 88 year old  (8/2/1929), who is being seen today for a federally mandated E/M visit at [facility].  HPI information obtained from: facility chart records, facility staff, patient report and [facility] chart review. Today's concerns are:  Type 2 diabetes mellitus without complication, without long-term current use of insulin (H)  Remains on metformin.  Fasting accucheck this month was 155.  [facility]     Chronic atrial fibrillation (H)  Remains on metoprolol bid. Pulse ranges this month have been , with 6>100/min, but these were before her morning dose.  No rates >100 late afternoon.    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  No recent changes to levothyroxine dose.  TSH of 0.60 in February.    Chronic low back pain with bilateral sciatica, unspecified back pain laterality  Remains on oxycodone and valium scheduled.  Has also had 19 prn dose of oxycodone and 16 prn doses of valium this month.  The valium  has been requested by her from 12 am to 5 am with the exception of four doses. Has received the prn oxycodone at anytime during day and night, often times taking the oxycodone with the valium.     Benzodiazepine dependence, pt reports daily use of valium since the 1930's.  Takes valium every HS and prn use as noted above. Discussed at length with patient.  She does not like how sleepy she is during the day.  Described the long half life of valium.  She stated that she uses it with the oxycodone for sleep aide in addition to pain relief.  Discussed that this was not a safe practice and would contribute to day time sleepiness.  Offered to increase the dose of prn oxycodone and dc the prn valium, but would leave the scheduled dose at HS.  She hesitantly agreed to this plan. Met with pt to discuss this issue from  11am to 11:35 am.     Frequent UTI, Dr Bigg Muhammad, Urologist  REmains on Hiprex daily.    Morbid obesity (H)  Weight has increased 25 lbs in past year.  Pt is non ambulatory and relies on electric w/c for all mobilization.    ALLERGIES: Bactrim [sulfamethoxazole w-trimethoprim]; Gabapentin; Lyrica; Methadone; Nsaids; Penicillin g; Tramadol; Cephalexin hcl; Clindamycin hcl; and Macrobid [nitrofurantoin]  PAST MEDICAL HISTORY:  has a past medical history of Advanced directives, counseling/discussion, POLST completed 8/27/15, DNR/DNI, No tube feedings, No dialysis,  Do not hospitalize unless comfort can be improved.  OK for IV's and antibiotics (8/27/2015); Agoraphobia without mention of panic attacks; Benzodiazepine dependence, pt reports daily use of valium since the 1930's. (8/27/2015); Chronic atrial fibrillation (H) (10/20/2017); Chronic low back pain with bilateral sciatica (6/3/2016); Constipation (8/27/2015); Degenerative disc disease; Dependent edema (8/27/2015); Depressive disorder, not elsewhere classified; Esophageal reflux; Hypothyroidism; Leg skin lesion, left (8/18/2017); Macular degeneration (senile) of retina (8/23/2016); Nasal bleeding (1/28/2016); Obstructive sleep apnea (adult) (pediatric); Postsurgical hypothyroidism, Surgical excision in her 20's. (1/28/2016); Reactive airway disease without complication (11/6/2017); Recurrent major depressive disorder, in partial remission (H) (12/6/2016); Seborrheic dermatitis (4/12/2017); Squamous cell carcinoma of skin of upper limb, including shoulder; Type 2 diabetes mellitus without complication (H) (12/16/2015); Unspecified curvature of spine; Unspecified essential hypertension; and Unspecified vitamin D deficiency.  PAST SURGICAL HISTORY:  has a past surgical history that includes Abdomen surgery (1940); hip surgery (2010); Foot surgery (2000); Hysterectomy vaginal (1980'S); Breast surgery (1970'S); Eye surgery (2003); Abdomen surgery (1990's); Head  and neck surgery (1960's); knee surgery (9/2004, 11/2007); implant back (4/7/2009); and Implant stimulator and leads sacral nerve (stage one and two) (2/16/2015 2005).  FAMILY HISTORY: family history includes Family History Negative in her son, son, son, and son; HEART DISEASE in her father and mother.  SOCIAL HISTORY:  reports that she quit smoking about 58 years ago. Her smoking use included Cigarettes. She has a 24.00 pack-year smoking history. She has never used smokeless tobacco. She reports that she uses illicit drugs. She reports that she does not drink alcohol.    MEDICATIONS:  Current Outpatient Prescriptions   Medication Sig Dispense Refill     OXYCODONE HCL PO Take 2.5 mg by mouth 2 times daily And q6h prn       levalbuterol (XOPENEX) 0.63 MG/3ML neb solution Take 3 mLs (0.63 mg) by nebulization every 8 hours as needed for shortness of breath / dyspnea or wheezing And bid for two weeks. 360 mL      furosemide (LASIX) 20 MG tablet Take 2 tablets (40 mg) by mouth 2 times daily 90 tablet 4     potassium chloride SA (K-DUR/KLOR-CON M) 10 MEQ CR tablet Take 2 tablets (20 mEq) by mouth daily 90 tablet 1     metoprolol (LOPRESSOR) 25 MG tablet Take 0.5 tablets (12.5 mg) by mouth 2 times daily Hold for systolic BP <100 or pulse <60. 90 tablet 3     chlorhexidine (PERIDEX) 0.12 % solution Swish and spit in mouth 2 times daily Rinse with 1/2 oz BID for 30 secs and expectorate       estradiol (ESTRACE VAGINAL) 0.1 MG/GM cream Place 2 g vaginally three times a week 42.5 g      Cranberry (CRANBERRY CONCENTRATE) 500 MG CAPS Take 1 capsule (500 mg) by mouth 2 times daily 90 capsule      VITAMIN D, CHOLECALCIFEROL, PO Take 2,000 Units by mouth daily       Selenium Sulfide 2.25 % SHAM Externally apply 1 Application topically twice a week       DiazePAM (VALIUM PO) Take 2.5 mg by mouth At Bedtime And q8h prn. Do not give more than 3 doses in 24 hrs,  Including scheduled dose.       Multiple Vitamins-Minerals (PRESERVISION  AREDS 2 PO) Take 1 tablet by mouth 2 times daily       Pseudoephedrine-DM-GG (ROBITUSSIN CF PO) Take 5 mLs by mouth 4 times daily as needed        Acetaminophen (TYLENOL PO) Take 650 mg by mouth 4 times daily       polyethylene glycol (MIRALAX/GLYCOLAX) powder Take 17 g by mouth daily as needed for constipation 119 g      metFORMIN (GLUCOPHAGE) 500 MG tablet Take 1 tablet (500 mg) by mouth 2 times daily (with meals) 180 tablet 1     pantoprazole (PROTONIX) 40 MG enteric coated tablet Take 1 tablet (40 mg) by mouth daily Take 30-60 minutes before a meal. 180 tablet 3     levothyroxine (SYNTHROID, LEVOTHROID) 125 MCG tablet Take 1 tablet (125 mcg) by mouth daily 90 tablet 1     sodium chloride ([employer] SALINE NASAL SPRAY) 0.65 % nasal spray Spray 2 sprays into both nostrils 2 times daily       aspirin 81 MG EC tablet Take 1 tablet (81 mg) by mouth daily 90 tablet 3     Ascorbic Acid 1000 MG TABS Take 1,000 mg by mouth daily       citalopram (CELEXA) 40 MG tablet TAKE 1 TABLET BY MOUTH DAILY 90 tablet 1     methenamine hippurate (HIPREX) 1 G TABS TAKE 1 TABLET BY MOUTH ONCE A DAY.  TAKE ALONG WITH 1000MG VITAMIN-C 180 tablet 4     mirabegron (MYRBETRIQ) 50 MG 24 hr tablet Take 1 tablet (50 mg) by mouth daily 30 tablet 11     Medications reviewed:  Medications reconciled to facility chart and changes were made to reflect current medications as identified as above med list. Below are the changes that were made:   Medications stopped since last EPIC medication reconciliation:   Medications Discontinued During This Encounter   Medication Reason     oxyCODONE (ROXICODONE) 5 MG immediate release tablet      budesonide (PULMICORT) 1 MG/2ML SUSP neb solution        Medications started since last [facility] medication reconciliation:  Orders Placed This Encounter   Medications     OXYCODONE HCL PO     Sig: Take 2.5 mg by mouth 2 times daily And q6h prn     Patient Active Problem List   Diagnosis     GERD (gastroesophageal reflux disease)      Systolic murmur     CARDIOVASCULAR SCREENING; LDL GOAL LESS THAN 100     JAMES on CPAP     Osteoporosis     Peripheral neuropathy     Anxiety     Vitamin D deficiency     Mixed incontinence urge and stress     Health Care Home     Frequent UTI, Dr Bigg Muhammad, Urologist     Candidiasis of skin     Dependent edema     Benzodiazepine dependence, pt reports daily use of valium since the 1930's.     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     Chronic low back pain with bilateral sciatica     Macular degeneration (senile) of retina     Recurrent major depressive disorder, in partial remission (H)     Seborrheic dermatitis     Morbid obesity (H)     BMI 35.0-35.9,adult     Leg skin lesion, left     Chronic atrial fibrillation (H)     Reactive airway disease without complication     Case Management:  I have reviewed the care plan and MDS and do agree with the plan. Patient's desire to return to the community is present, but is not able due to care needs .  Information reviewed:  Medications, vital signs, orders, and nursing notes.    ROS:  Negative selected, CONSTITUTIONAL: weight loss, weight gain, poor appetite and fevers, EYES: tearing, redness and Positive for eyeglasses and macular degeneration.  Followed by Dr. Carlson ENT: [place], dysphagia and positive for h/o epistaxis,  CV: chest pain and Positive for dependent edema and now weight gain with irregular heartrate.., RESPIRATORY: , Positive for occasional cough.  No SOB., : Positive for frequent UTI's and dribble incontinence. Has implanted stimulator for bladder control.... GI: N&V.  Positive for occasional loose stools and constipation.  H/o occ rectal bleeding.. NEURO: headaches and Positive for variable memory issues - son reports it is dependent on how much valium and oxycodone she has taken & pt agrees and MUSCULOSKELETAL: Positive for chronic  "back pain .    Exam:  Vitals: /65  Pulse 104  Temp 97.4  F (36.3  C)  Resp 20  Ht 5' 6\" (1.676 m)  Wt 231 lb (104.8 kg)  BMI 37.28 kg/m2  BMI= Body mass index is 37.28 kg/(m^2).  GENERAL APPEARANCE: Alert, overweight female seen.  Resting in bed.  Well groomed. .  Able to  express self verbally. Pale facial color. Some forgetfulness of past details.   HEAD:  Normocephalic.  No facial asymmetry..  ENT: Mouth and posterior oropharynx normal, moist mucous membranes, .  EYES: EOM normal, conjunctiva and lids normal. Had eye appt today with retinal MD and had intraocular injection.  Pupils remain dilated.   RESP:  No cough during visit.   Diminished lung sounds bilateral lower lobes, with crackles at bases (per baseline), but no wheezes or rhonchi today.  Old surgical scar from thyroidectomy visible at base of neck and upper chest..  CV: Irregularly, irregular rate and rhythm, 2/6 systolic murmur. +2 ankle and pedal edema. DP pulses +1 bilateral and MAHAMED stocking put back on.  No pedal wounds._  ABDOMEN: Large, rounded abdomen. BS's positive all 4 quadrants. No discomfort with palpation of abdomen including suprapubic area.  SKIN:  Left posterior lower leg incision has healed following lesion removal.  Dry skin remains over site.  Pt has post op appt in February for evaluation of both this site and punch biopsy site on back.   MUSC:  NO pain with palpation of spinal process or with ROM of lower extremities and hips. .  NEURO:  Alert and oriented X3.  Purposeful movement of extremities without focal weakness, although legs are weaker than arms due to deconditioning. No tremors or cogwheeling.  Strong hand grasp bilaterally.    Lab/Diagnostic data:    CBC RESULTS:   Recent Labs   Lab Test 11/09/17 10/19/17 10/05/17   09/21/13   1455   06/01/11   1845   WBC   --   8.8  9.3   < >  10.9   --    --    RBC   --   4.35  4.35   < >  4.77   --    --    HGB  13.9  13.3  13.2   < >  14.9   --   15.0   HCT   --   42.6  " 42.1   < >  43.1   --   49.2   MCV   --   97.9  96.8   < >  90   --   94.1   MCH   --    --    --    --   31.2   --   28.7   MCHC   --    --    --    --   34.6   --   30.5*   RDW   --   13.1  13.4   < >  12.7   --   13.7   PLT   --   201  153   < >  128*   < >   --     < > = values in this interval not displayed.       Last Basic Metabolic Panel:  Recent Labs   Lab Test 11/21/17 11/09/17   NA  139  143   POTASSIUM  4.4  4.4   CHLORIDE  98  99   KURTIS  10.0  10.4*   CO2  32*  35*   BUN  16  23   CR  0.81  0.88   GLC  213*  155*     GFR Estimate   Date Value Ref Range Status   11/21/2017 >60 >60 ml/min/1.73m2 Final   11/09/2017 59 (L) >60 ml/min/1.73m2 Final   10/26/2017 >60 >60 mL/min/1.73m2 Final   10/23/2017 >60 >60 ml/min/1.73m2 Final   10/19/2017 >60 >60 ml/min/1.73m2 Final     Lab Results   Component Value Date    A1C 6.0 08/21/2017    A1C 5.8 02/02/2017    A1C 5.7 08/24/2016    A1C 5.8 04/14/2016    A1C 5.9 12/17/2015     TSH   Date Value Ref Range Status   02/02/2017 0.60 0.20 - 4.50 mcU/mL Final   02/02/2017 0.60 0.20 - 4.50 ulU/ml Final   ]  ASSESSMENT/PLAN  (E11.9) Type 2 diabetes mellitus without complication, without long-term current use of insulin (H)  (primary encounter diagnosis)  Comment: Chronic, HgbA1C goal <8%, at goal  Plan: Continue current dose of metformin and fasting accuchecks every other week.  HgbA1C on Thursday.    (I48.2) Chronic atrial fibrillation (H)  Comment: Chronic rate controlled  Plan: Continue current dose of metoprolol and ASA for anticoagulation. BMP Thursday.    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  Comment: Chronic  Plan: Continue current dose of levothyroxine and check TSH and free T4 in February.    (M54.41,  G89.29,  M54.42) Chronic low back pain with bilateral sciatica, unspecified back pain laterality  Comment: Chronic  Plan: Will increase prn oxycodone to 5 mg q6h prn, but will dc uses of prn valium due to potential severe side effects of  taking the two together.  She will monitor.      (F13.20) Benzodiazepine dependence, pt reports daily use of valium since the 1930's.  Comment: Chronic  Plan: Continue scheduled valium but will dc prn valium due to risk of oversedation.  Monitor.  If increased valium is needed, will increase the HS dose to 5 mg.     (N39.0) Frequent UTI, Dr Bigg Muhammad, Urologist  Comment: Chronic  Plan: Continue Hiprex and monitor.    (E66.01) Morbid obesity (H)  Comment: Chronic  Plan: Continue to encourage positive food choices.     Electronically signed by:  SABINE Borjas CNP

## 2017-12-18 ENCOUNTER — NURSING HOME VISIT (OUTPATIENT)
Dept: GERIATRICS | Facility: CLINIC | Age: 82
End: 2017-12-18
Payer: COMMERCIAL

## 2017-12-18 VITALS
HEIGHT: 66 IN | RESPIRATION RATE: 20 BRPM | BODY MASS INDEX: 37.12 KG/M2 | WEIGHT: 231 LBS | TEMPERATURE: 97.4 F | DIASTOLIC BLOOD PRESSURE: 65 MMHG | SYSTOLIC BLOOD PRESSURE: 104 MMHG | HEART RATE: 104 BPM

## 2017-12-18 DIAGNOSIS — F13.20 BENZODIAZEPINE DEPENDENCE (H): ICD-10-CM

## 2017-12-18 DIAGNOSIS — E66.01 MORBID OBESITY (H): ICD-10-CM

## 2017-12-18 DIAGNOSIS — M54.41 CHRONIC LOW BACK PAIN WITH BILATERAL SCIATICA, UNSPECIFIED BACK PAIN LATERALITY: ICD-10-CM

## 2017-12-18 DIAGNOSIS — G89.29 CHRONIC LOW BACK PAIN WITH BILATERAL SCIATICA, UNSPECIFIED BACK PAIN LATERALITY: ICD-10-CM

## 2017-12-18 DIAGNOSIS — I48.20 CHRONIC ATRIAL FIBRILLATION (H): ICD-10-CM

## 2017-12-18 DIAGNOSIS — N39.0 FREQUENT UTI: ICD-10-CM

## 2017-12-18 DIAGNOSIS — M54.42 CHRONIC LOW BACK PAIN WITH BILATERAL SCIATICA, UNSPECIFIED BACK PAIN LATERALITY: ICD-10-CM

## 2017-12-18 DIAGNOSIS — E11.9 TYPE 2 DIABETES MELLITUS WITHOUT COMPLICATION, WITHOUT LONG-TERM CURRENT USE OF INSULIN (H): Primary | ICD-10-CM

## 2017-12-18 DIAGNOSIS — E89.0 POSTSURGICAL HYPOTHYROIDISM: ICD-10-CM

## 2017-12-18 PROCEDURE — 99310 SBSQ NF CARE HIGH MDM 45: CPT | Performed by: NURSE PRACTITIONER

## 2017-12-18 RX ORDER — DIAZEPAM 5 MG
TABLET ORAL
Qty: 60 TABLET
Start: 2017-12-18 | End: 2018-01-01

## 2017-12-18 RX ORDER — OXYCODONE HYDROCHLORIDE 5 MG/1
2.5 TABLET ORAL 2 TIMES DAILY
Qty: 18 TABLET | Refills: 0
Start: 2017-12-18 | End: 2018-01-30

## 2017-12-21 ENCOUNTER — TRANSFERRED RECORDS (OUTPATIENT)
Dept: HEALTH INFORMATION MANAGEMENT | Facility: CLINIC | Age: 82
End: 2017-12-21

## 2017-12-21 LAB
CHLORIDE SERPLBLD-SCNC: 97 MMOL/L (ref 98–109)
CO2 SERPL-SCNC: 35 MMOL/L (ref 22–31)
HEMOGLOBIN: 13.9 G/DL (ref 11.8–15.5)
POTASSIUM SERPL-SCNC: 4.2 MMOL/L (ref 3.5–5.2)
SODIUM SERPL-SCNC: 141 MMOL/L (ref 136–145)

## 2018-01-01 ENCOUNTER — NURSING HOME VISIT (OUTPATIENT)
Dept: GERIATRICS | Facility: CLINIC | Age: 83
End: 2018-01-01
Payer: COMMERCIAL

## 2018-01-01 ENCOUNTER — TRANSFERRED RECORDS (OUTPATIENT)
Dept: HEALTH INFORMATION MANAGEMENT | Facility: CLINIC | Age: 83
End: 2018-01-01

## 2018-01-01 ENCOUNTER — NURSING HOME VISIT (OUTPATIENT)
Dept: GERIATRICS | Facility: CLINIC | Age: 83
End: 2018-01-01

## 2018-01-01 ENCOUNTER — TELEPHONE (OUTPATIENT)
Dept: GERIATRICS | Facility: CLINIC | Age: 83
End: 2018-01-01

## 2018-01-01 VITALS
RESPIRATION RATE: 18 BRPM | WEIGHT: 242 LBS | HEART RATE: 126 BPM | TEMPERATURE: 97.1 F | HEIGHT: 66 IN | BODY MASS INDEX: 38.89 KG/M2 | DIASTOLIC BLOOD PRESSURE: 83 MMHG | SYSTOLIC BLOOD PRESSURE: 136 MMHG | OXYGEN SATURATION: 83 %

## 2018-01-01 VITALS
TEMPERATURE: 97.2 F | WEIGHT: 235 LBS | HEART RATE: 80 BPM | BODY MASS INDEX: 37.77 KG/M2 | RESPIRATION RATE: 20 BRPM | SYSTOLIC BLOOD PRESSURE: 145 MMHG | DIASTOLIC BLOOD PRESSURE: 82 MMHG | HEIGHT: 66 IN

## 2018-01-01 VITALS
DIASTOLIC BLOOD PRESSURE: 66 MMHG | TEMPERATURE: 98 F | BODY MASS INDEX: 36.48 KG/M2 | HEIGHT: 66 IN | HEART RATE: 120 BPM | WEIGHT: 227 LBS | RESPIRATION RATE: 18 BRPM | SYSTOLIC BLOOD PRESSURE: 108 MMHG

## 2018-01-01 VITALS
BODY MASS INDEX: 38.25 KG/M2 | TEMPERATURE: 96.5 F | DIASTOLIC BLOOD PRESSURE: 74 MMHG | SYSTOLIC BLOOD PRESSURE: 116 MMHG | WEIGHT: 238 LBS | HEART RATE: 78 BPM | OXYGEN SATURATION: 97 % | HEIGHT: 66 IN | RESPIRATION RATE: 18 BRPM

## 2018-01-01 VITALS
SYSTOLIC BLOOD PRESSURE: 115 MMHG | TEMPERATURE: 97.2 F | DIASTOLIC BLOOD PRESSURE: 61 MMHG | HEART RATE: 84 BPM | BODY MASS INDEX: 38.81 KG/M2 | WEIGHT: 241.5 LBS | HEIGHT: 66 IN

## 2018-01-01 VITALS
OXYGEN SATURATION: 94 % | HEIGHT: 66 IN | HEART RATE: 80 BPM | TEMPERATURE: 97.3 F | DIASTOLIC BLOOD PRESSURE: 72 MMHG | SYSTOLIC BLOOD PRESSURE: 103 MMHG | RESPIRATION RATE: 18 BRPM | WEIGHT: 215 LBS | BODY MASS INDEX: 34.55 KG/M2

## 2018-01-01 VITALS
WEIGHT: 228 LBS | TEMPERATURE: 97.2 F | DIASTOLIC BLOOD PRESSURE: 64 MMHG | HEART RATE: 81 BPM | HEIGHT: 66 IN | SYSTOLIC BLOOD PRESSURE: 119 MMHG | RESPIRATION RATE: 20 BRPM

## 2018-01-01 VITALS
HEIGHT: 66 IN | SYSTOLIC BLOOD PRESSURE: 112 MMHG | WEIGHT: 224 LBS | HEART RATE: 96 BPM | BODY MASS INDEX: 36 KG/M2 | DIASTOLIC BLOOD PRESSURE: 76 MMHG | TEMPERATURE: 98 F | RESPIRATION RATE: 20 BRPM

## 2018-01-01 VITALS
DIASTOLIC BLOOD PRESSURE: 68 MMHG | TEMPERATURE: 97.6 F | WEIGHT: 245 LBS | HEART RATE: 88 BPM | BODY MASS INDEX: 39.54 KG/M2 | OXYGEN SATURATION: 92 % | SYSTOLIC BLOOD PRESSURE: 128 MMHG | RESPIRATION RATE: 18 BRPM

## 2018-01-01 VITALS
TEMPERATURE: 97 F | BODY MASS INDEX: 38.81 KG/M2 | RESPIRATION RATE: 18 BRPM | HEIGHT: 66 IN | HEART RATE: 89 BPM | WEIGHT: 241.5 LBS | OXYGEN SATURATION: 94 % | DIASTOLIC BLOOD PRESSURE: 85 MMHG | SYSTOLIC BLOOD PRESSURE: 135 MMHG

## 2018-01-01 VITALS
BODY MASS INDEX: 36.48 KG/M2 | SYSTOLIC BLOOD PRESSURE: 102 MMHG | HEART RATE: 84 BPM | OXYGEN SATURATION: 94 % | TEMPERATURE: 97.8 F | WEIGHT: 226 LBS | DIASTOLIC BLOOD PRESSURE: 69 MMHG | RESPIRATION RATE: 18 BRPM

## 2018-01-01 VITALS
SYSTOLIC BLOOD PRESSURE: 110 MMHG | HEIGHT: 66 IN | TEMPERATURE: 97.6 F | BODY MASS INDEX: 35.84 KG/M2 | SYSTOLIC BLOOD PRESSURE: 130 MMHG | RESPIRATION RATE: 18 BRPM | OXYGEN SATURATION: 81 % | RESPIRATION RATE: 20 BRPM | HEIGHT: 66 IN | DIASTOLIC BLOOD PRESSURE: 76 MMHG | HEART RATE: 84 BPM | WEIGHT: 238 LBS | WEIGHT: 223 LBS | HEART RATE: 61 BPM | BODY MASS INDEX: 38.25 KG/M2 | DIASTOLIC BLOOD PRESSURE: 78 MMHG | TEMPERATURE: 96.8 F

## 2018-01-01 VITALS
RESPIRATION RATE: 18 BRPM | WEIGHT: 227 LBS | HEIGHT: 66 IN | DIASTOLIC BLOOD PRESSURE: 72 MMHG | SYSTOLIC BLOOD PRESSURE: 110 MMHG | TEMPERATURE: 97.9 F | BODY MASS INDEX: 36.48 KG/M2 | HEART RATE: 95 BPM | OXYGEN SATURATION: 91 %

## 2018-01-01 VITALS
HEART RATE: 50 BPM | WEIGHT: 240 LBS | HEIGHT: 66 IN | DIASTOLIC BLOOD PRESSURE: 71 MMHG | BODY MASS INDEX: 38.57 KG/M2 | TEMPERATURE: 97.8 F | SYSTOLIC BLOOD PRESSURE: 114 MMHG

## 2018-01-01 VITALS
HEIGHT: 66 IN | RESPIRATION RATE: 20 BRPM | BODY MASS INDEX: 41.78 KG/M2 | HEART RATE: 88 BPM | OXYGEN SATURATION: 97 % | WEIGHT: 260 LBS | SYSTOLIC BLOOD PRESSURE: 119 MMHG | TEMPERATURE: 96.7 F | DIASTOLIC BLOOD PRESSURE: 67 MMHG

## 2018-01-01 VITALS
BODY MASS INDEX: 37.77 KG/M2 | RESPIRATION RATE: 20 BRPM | TEMPERATURE: 97 F | HEIGHT: 66 IN | DIASTOLIC BLOOD PRESSURE: 78 MMHG | HEART RATE: 115 BPM | SYSTOLIC BLOOD PRESSURE: 121 MMHG | WEIGHT: 235 LBS

## 2018-01-01 VITALS
WEIGHT: 228 LBS | RESPIRATION RATE: 20 BRPM | HEART RATE: 61 BPM | HEIGHT: 66 IN | BODY MASS INDEX: 36.64 KG/M2 | SYSTOLIC BLOOD PRESSURE: 106 MMHG | TEMPERATURE: 96.3 F | DIASTOLIC BLOOD PRESSURE: 65 MMHG

## 2018-01-01 VITALS
RESPIRATION RATE: 20 BRPM | BODY MASS INDEX: 35.68 KG/M2 | WEIGHT: 222 LBS | HEIGHT: 66 IN | SYSTOLIC BLOOD PRESSURE: 105 MMHG | HEART RATE: 88 BPM | TEMPERATURE: 97.7 F | DIASTOLIC BLOOD PRESSURE: 68 MMHG

## 2018-01-01 VITALS
TEMPERATURE: 97.6 F | BODY MASS INDEX: 38.89 KG/M2 | HEART RATE: 76 BPM | HEIGHT: 66 IN | DIASTOLIC BLOOD PRESSURE: 82 MMHG | SYSTOLIC BLOOD PRESSURE: 142 MMHG | WEIGHT: 242 LBS

## 2018-01-01 VITALS
RESPIRATION RATE: 20 BRPM | SYSTOLIC BLOOD PRESSURE: 115 MMHG | OXYGEN SATURATION: 93 % | BODY MASS INDEX: 38.57 KG/M2 | WEIGHT: 240 LBS | HEART RATE: 74 BPM | TEMPERATURE: 97.1 F | HEIGHT: 66 IN | DIASTOLIC BLOOD PRESSURE: 69 MMHG

## 2018-01-01 VITALS
HEIGHT: 66 IN | TEMPERATURE: 97.6 F | SYSTOLIC BLOOD PRESSURE: 114 MMHG | OXYGEN SATURATION: 90 % | RESPIRATION RATE: 20 BRPM | HEART RATE: 98 BPM | BODY MASS INDEX: 36.64 KG/M2 | DIASTOLIC BLOOD PRESSURE: 72 MMHG | WEIGHT: 228 LBS

## 2018-01-01 VITALS
SYSTOLIC BLOOD PRESSURE: 126 MMHG | WEIGHT: 235 LBS | DIASTOLIC BLOOD PRESSURE: 71 MMHG | HEART RATE: 104 BPM | TEMPERATURE: 97.6 F | RESPIRATION RATE: 20 BRPM | OXYGEN SATURATION: 90 % | BODY MASS INDEX: 37.93 KG/M2

## 2018-01-01 VITALS
OXYGEN SATURATION: 90 % | TEMPERATURE: 98.1 F | SYSTOLIC BLOOD PRESSURE: 110 MMHG | RESPIRATION RATE: 18 BRPM | DIASTOLIC BLOOD PRESSURE: 68 MMHG | BODY MASS INDEX: 36.64 KG/M2 | HEART RATE: 66 BPM | WEIGHT: 227 LBS

## 2018-01-01 VITALS
BODY MASS INDEX: 39.37 KG/M2 | WEIGHT: 245 LBS | TEMPERATURE: 98.2 F | RESPIRATION RATE: 20 BRPM | HEIGHT: 66 IN | DIASTOLIC BLOOD PRESSURE: 74 MMHG | HEART RATE: 106 BPM | SYSTOLIC BLOOD PRESSURE: 111 MMHG

## 2018-01-01 DIAGNOSIS — R41.0 CONFUSION: ICD-10-CM

## 2018-01-01 DIAGNOSIS — G31.84 MILD COGNITIVE IMPAIRMENT WITH MEMORY LOSS: ICD-10-CM

## 2018-01-01 DIAGNOSIS — I50.9 CHF (CONGESTIVE HEART FAILURE), NYHA CLASS III, UNSPECIFIED FAILURE CHRONICITY, UNSPECIFIED TYPE (H): ICD-10-CM

## 2018-01-01 DIAGNOSIS — I48.20 CHRONIC ATRIAL FIBRILLATION (H): ICD-10-CM

## 2018-01-01 DIAGNOSIS — E11.9 TYPE 2 DIABETES MELLITUS WITHOUT COMPLICATION, WITHOUT LONG-TERM CURRENT USE OF INSULIN (H): ICD-10-CM

## 2018-01-01 DIAGNOSIS — R62.7 FAILURE TO THRIVE IN ADULT: ICD-10-CM

## 2018-01-01 DIAGNOSIS — F41.1 GAD (GENERALIZED ANXIETY DISORDER): ICD-10-CM

## 2018-01-01 DIAGNOSIS — E87.29 CARBON DIOXIDE RETENTION: ICD-10-CM

## 2018-01-01 DIAGNOSIS — G47.00 INSOMNIA, UNSPECIFIED TYPE: Primary | ICD-10-CM

## 2018-01-01 DIAGNOSIS — J20.9 ACUTE BRONCHITIS WITH SYMPTOMS > 10 DAYS: Primary | ICD-10-CM

## 2018-01-01 DIAGNOSIS — K59.01 SLOW TRANSIT CONSTIPATION: ICD-10-CM

## 2018-01-01 DIAGNOSIS — I50.9 CHF (CONGESTIVE HEART FAILURE), NYHA CLASS III, UNSPECIFIED FAILURE CHRONICITY, UNSPECIFIED TYPE (H): Primary | ICD-10-CM

## 2018-01-01 DIAGNOSIS — R53.81 DECLINING FUNCTIONAL STATUS: ICD-10-CM

## 2018-01-01 DIAGNOSIS — E87.29 RESPIRATORY ACIDOSIS: Primary | ICD-10-CM

## 2018-01-01 DIAGNOSIS — E11.9 TYPE 2 DIABETES MELLITUS WITHOUT COMPLICATION, WITHOUT LONG-TERM CURRENT USE OF INSULIN (H): Primary | ICD-10-CM

## 2018-01-01 DIAGNOSIS — E89.0 POSTSURGICAL HYPOTHYROIDISM: ICD-10-CM

## 2018-01-01 DIAGNOSIS — K21.9 GASTROESOPHAGEAL REFLUX DISEASE WITHOUT ESOPHAGITIS: ICD-10-CM

## 2018-01-01 DIAGNOSIS — J18.9 PNEUMONIA OF LEFT LOWER LOBE DUE TO INFECTIOUS ORGANISM: ICD-10-CM

## 2018-01-01 DIAGNOSIS — F33.41 RECURRENT MAJOR DEPRESSIVE DISORDER, IN PARTIAL REMISSION (H): ICD-10-CM

## 2018-01-01 DIAGNOSIS — E66.01 MORBID OBESITY (H): ICD-10-CM

## 2018-01-01 DIAGNOSIS — G47.33 OSA ON CPAP: Chronic | ICD-10-CM

## 2018-01-01 DIAGNOSIS — I48.20 CHRONIC ATRIAL FIBRILLATION (H): Primary | ICD-10-CM

## 2018-01-01 DIAGNOSIS — M79.641 PAIN OF RIGHT HAND: ICD-10-CM

## 2018-01-01 DIAGNOSIS — Z98.82 H/O BREAST IMPLANT: ICD-10-CM

## 2018-01-01 DIAGNOSIS — J45.40 MODERATE PERSISTENT REACTIVE AIRWAY DISEASE WITHOUT COMPLICATION: ICD-10-CM

## 2018-01-01 DIAGNOSIS — J20.9 ACUTE BRONCHITIS WITH SYMPTOMS GREATER THAN 10 DAYS: Primary | ICD-10-CM

## 2018-01-01 DIAGNOSIS — F41.9 ANXIETY: Chronic | ICD-10-CM

## 2018-01-01 DIAGNOSIS — J18.9 PNEUMONIA OF BOTH LOWER LOBES DUE TO INFECTIOUS ORGANISM: Primary | ICD-10-CM

## 2018-01-01 DIAGNOSIS — F33.1 MODERATE RECURRENT MAJOR DEPRESSION (H): ICD-10-CM

## 2018-01-01 DIAGNOSIS — R09.02 HYPOXIA: ICD-10-CM

## 2018-01-01 DIAGNOSIS — I50.9: ICD-10-CM

## 2018-01-01 DIAGNOSIS — J45.20 MILD INTERMITTENT REACTIVE AIRWAY DISEASE WITH WHEEZING WITHOUT COMPLICATION: ICD-10-CM

## 2018-01-01 DIAGNOSIS — G47.33 OSA (OBSTRUCTIVE SLEEP APNEA): ICD-10-CM

## 2018-01-01 DIAGNOSIS — Z13.6 HYPERTENSION SCREEN: ICD-10-CM

## 2018-01-01 DIAGNOSIS — E87.4 ACID-BASE IMBALANCE: ICD-10-CM

## 2018-01-01 DIAGNOSIS — G63 POLYNEUROPATHY ASSOCIATED WITH UNDERLYING DISEASE (H): Chronic | ICD-10-CM

## 2018-01-01 DIAGNOSIS — E87.20 ACIDOSIS: ICD-10-CM

## 2018-01-01 DIAGNOSIS — F13.20 BENZODIAZEPINE DEPENDENCE (H): ICD-10-CM

## 2018-01-01 DIAGNOSIS — E87.20 METABOLIC ACIDOSIS: ICD-10-CM

## 2018-01-01 DIAGNOSIS — E87.3 METABOLIC ALKALOSIS: ICD-10-CM

## 2018-01-01 DIAGNOSIS — E87.4 ACID-BASE IMBALANCE: Primary | ICD-10-CM

## 2018-01-01 DIAGNOSIS — E87.29 RESPIRATORY ACIDOSIS: ICD-10-CM

## 2018-01-01 DIAGNOSIS — K21.9 GASTROESOPHAGEAL REFLUX DISEASE, ESOPHAGITIS PRESENCE NOT SPECIFIED: ICD-10-CM

## 2018-01-01 DIAGNOSIS — F33.0 MAJOR DEPRESSIVE DISORDER, RECURRENT EPISODE, MILD (H): ICD-10-CM

## 2018-01-01 DIAGNOSIS — I48.91 ATRIAL FIBRILLATION WITH RVR (H): ICD-10-CM

## 2018-01-01 DIAGNOSIS — R09.02 HYPOXIA: Primary | ICD-10-CM

## 2018-01-01 DIAGNOSIS — J45.40 MODERATE PERSISTENT REACTIVE AIRWAY DISEASE WITHOUT COMPLICATION: Primary | ICD-10-CM

## 2018-01-01 DIAGNOSIS — F33.0 MAJOR DEPRESSIVE DISORDER, RECURRENT EPISODE, MILD (H): Primary | ICD-10-CM

## 2018-01-01 DIAGNOSIS — R60.9 DEPENDENT EDEMA: ICD-10-CM

## 2018-01-01 LAB
BUN SERPL-MCNC: 12 MG/DL (ref 9–26)
BUN SERPL-MCNC: 13 MG/DL (ref 9–26)
BUN SERPL-MCNC: 14 MG/DL (ref 9–26)
BUN SERPL-MCNC: 14 MG/DL (ref 9–26)
BUN SERPL-MCNC: 15 MG/DL (ref 9–26)
BUN SERPL-MCNC: 16 MG/DL (ref 9–26)
BUN SERPL-MCNC: 17 MG/DL (ref 9–26)
BUN SERPL-MCNC: 18 MG/DL (ref 9–26)
BUN SERPL-MCNC: 19 MG/DL (ref 9–26)
BUN SERPL-MCNC: 20 MG/DL (ref 9–26)
BUN SERPL-MCNC: 20 MG/DL (ref 9–26)
BUN SERPL-MCNC: 22 MG/DL (ref 9–26)
BUN SERPL-MCNC: 22 MG/DL (ref 9–26)
BUN SERPL-MCNC: 23 MG/DL (ref 9–26)
BUN SERPL-MCNC: 25 MG/DL (ref 9–26)
BUN SERPL-MCNC: 26 MG/DL (ref 9–26)
BUN SERPL-MCNC: 42 MG/DL (ref 9–26)
CALCIUM SERPL-MCNC: 10 MG/DL (ref 8.4–10.4)
CALCIUM SERPL-MCNC: 10.1 MG/DL (ref 8.4–10.4)
CALCIUM SERPL-MCNC: 10.2 MG/DL (ref 8.4–10.4)
CALCIUM SERPL-MCNC: 10.3 MG/DL (ref 8.4–10.4)
CALCIUM SERPL-MCNC: 10.4 MG/DL (ref 8.4–10.4)
CALCIUM SERPL-MCNC: 10.4 MG/DL (ref 8.4–10.4)
CALCIUM SERPL-MCNC: 10.5 MG/DL (ref 8.4–10.4)
CALCIUM SERPL-MCNC: 10.5 MG/DL (ref 8.4–10.4)
CALCIUM SERPL-MCNC: 9.7 MG/DL (ref 8.4–10.4)
CALCIUM SERPL-MCNC: 9.8 MG/DL (ref 8.4–10.4)
CALCIUM SERPL-MCNC: 9.8 MG/DL (ref 8.4–10.4)
CALCIUM SERPL-MCNC: 9.9 MG/DL (ref 8.4–10.4)
CHLORIDE SERPLBLD-SCNC: 80 MMOL/L (ref 98–109)
CHLORIDE SERPLBLD-SCNC: 83 MMOL/L (ref 98–109)
CHLORIDE SERPLBLD-SCNC: 85 MMOL/L (ref 98–109)
CHLORIDE SERPLBLD-SCNC: 86 MMOL/L (ref 98–109)
CHLORIDE SERPLBLD-SCNC: 88 MMOL/L (ref 98–109)
CHLORIDE SERPLBLD-SCNC: 88 MMOL/L (ref 98–109)
CHLORIDE SERPLBLD-SCNC: 89 MMOL/L (ref 98–109)
CHLORIDE SERPLBLD-SCNC: 90 MMOL/L (ref 98–109)
CHLORIDE SERPLBLD-SCNC: 90 MMOL/L (ref 98–109)
CHLORIDE SERPLBLD-SCNC: 91 MMOL/L (ref 98–109)
CHLORIDE SERPLBLD-SCNC: 92 MMOL/L (ref 98–109)
CHLORIDE SERPLBLD-SCNC: 94 MMOL/L (ref 98–109)
CHLORIDE SERPLBLD-SCNC: 95 MMOL/L (ref 98–109)
CHLORIDE SERPLBLD-SCNC: 97 MMOL/L (ref 98–109)
CHLORIDE SERPLBLD-SCNC: 98 MMOL/L (ref 98–109)
CHLORIDE SERPLBLD-SCNC: 98 MMOL/L (ref 98–109)
CHLORIDE SERPLBLD-SCNC: 99 MMOL/L (ref 98–109)
CO2 SERPL-SCNC: 29 MMOL/L (ref 22–31)
CO2 SERPL-SCNC: 30 MMOL/L (ref 22–31)
CO2 SERPL-SCNC: 31 MMOL/L (ref 22–31)
CO2 SERPL-SCNC: 32 MMOL/L (ref 22–31)
CO2 SERPL-SCNC: 33 MMOL/L (ref 22–31)
CO2 SERPL-SCNC: 33 MMOL/L (ref 22–31)
CO2 SERPL-SCNC: 34 MMOL/L (ref 22–31)
CO2 SERPL-SCNC: 34 MMOL/L (ref 22–31)
CO2 SERPL-SCNC: 35 MMOL/L (ref 22–31)
CO2 SERPL-SCNC: 38 MMOL/L (ref 22–31)
CO2 SERPL-SCNC: 39 MMOL/L (ref 22–31)
CO2 SERPL-SCNC: 40 MMOL/L (ref 22–31)
CO2 SERPL-SCNC: 41 MMOL/L (ref 22–31)
CO2 SERPL-SCNC: 43 MMOL/L (ref 22–31)
CO2 SERPL-SCNC: 44 MMOL/L (ref 22–31)
CO2 SERPL-SCNC: 45 MMOL/L (ref 22–31)
CO2 SERPL-SCNC: 45 MMOL/L (ref 22–31)
CO2 SERPL-SCNC: >45 MMOL/L (ref 22–31)
CO2 SERPL-SCNC: >45 MMOL/L (ref 22–31)
CREAT SERPL-MCNC: 0.54 MG/DL (ref 0.55–1.02)
CREAT SERPL-MCNC: 0.57 MG/DL (ref 0.55–1.02)
CREAT SERPL-MCNC: 0.62 MG/DL (ref 0.55–1.02)
CREAT SERPL-MCNC: 0.64 MG/DL (ref 0.55–1.05)
CREAT SERPL-MCNC: 0.65 MG/DL (ref 0.55–1.02)
CREAT SERPL-MCNC: 0.65 MG/DL (ref 0.55–1.02)
CREAT SERPL-MCNC: 0.68 MG/DL (ref 0.55–1.02)
CREAT SERPL-MCNC: 0.7 MG/DL (ref 0.55–1.02)
CREAT SERPL-MCNC: 0.7 MG/DL (ref 0.55–1.02)
CREAT SERPL-MCNC: 0.73 MG/DL (ref 0.55–1.02)
CREAT SERPL-MCNC: 0.74 MG/DL (ref 0.55–1.02)
CREAT SERPL-MCNC: 0.76 MG/DL (ref 0.55–1.02)
CREAT SERPL-MCNC: 0.76 MG/DL (ref 0.55–1.02)
CREAT SERPL-MCNC: 0.81 MG/DL (ref 0.55–1.02)
CREAT SERPL-MCNC: 0.83 MG/DL (ref 0.55–1.02)
CREAT SERPL-MCNC: 0.83 MG/DL (ref 0.55–1.02)
CREAT SERPL-MCNC: 0.84 MG/DL (ref 0.55–1.02)
CREAT SERPL-MCNC: 0.9 MG/DL (ref 0.55–1.02)
CREAT SERPL-MCNC: 0.9 MG/DL (ref 0.55–1.02)
CREAT SERPL-MCNC: 1.15 MG/DL (ref 0.55–1.02)
DIFFERENTIAL: ABNORMAL
DIFFERENTIAL: NORMAL
ERYTHROCYTE [DISTWIDTH] IN BLOOD BY AUTOMATED COUNT: 13.5 % (ref 11–15)
ERYTHROCYTE [DISTWIDTH] IN BLOOD BY AUTOMATED COUNT: 13.5 % (ref 11–15)
ERYTHROCYTE [DISTWIDTH] IN BLOOD BY AUTOMATED COUNT: 15.6 % (ref 11–15)
ERYTHROCYTE [DISTWIDTH] IN BLOOD BY AUTOMATED COUNT: 15.8 % (ref 11–15)
ERYTHROCYTE [DISTWIDTH] IN BLOOD BY AUTOMATED COUNT: 15.8 % (ref 11–15)
ERYTHROCYTE [DISTWIDTH] IN BLOOD BY AUTOMATED COUNT: 15.9 % (ref 11–15)
ERYTHROCYTE [DISTWIDTH] IN BLOOD BY AUTOMATED COUNT: 16.4 % (ref 11–15)
GFR SERPL CREATININE-BSD FRML MDRD: 42 ML/MIN/1.73M2
GFR SERPL CREATININE-BSD FRML MDRD: 57 ML/MIN/1.73M2
GFR SERPL CREATININE-BSD FRML MDRD: 57 ML/MIN/1.73M2
GFR SERPL CREATININE-BSD FRML MDRD: >60 ML/MIN/1.73M2
GLUCOSE SERPL-MCNC: 105 MG/DL (ref 70–100)
GLUCOSE SERPL-MCNC: 107 MG/DL (ref 70–100)
GLUCOSE SERPL-MCNC: 108 MG/DL (ref 70–100)
GLUCOSE SERPL-MCNC: 114 MG/DL (ref 70–100)
GLUCOSE SERPL-MCNC: 114 MG/DL (ref 70–100)
GLUCOSE SERPL-MCNC: 116 MG/DL (ref 70–100)
GLUCOSE SERPL-MCNC: 121 MG/DL (ref 70–100)
GLUCOSE SERPL-MCNC: 122 MG/DL (ref 70–100)
GLUCOSE SERPL-MCNC: 126 MG/DL (ref 70–100)
GLUCOSE SERPL-MCNC: 128 MG/DL (ref 70–100)
GLUCOSE SERPL-MCNC: 130 MG/DL (ref 70–100)
GLUCOSE SERPL-MCNC: 139 MG/DL (ref 70–100)
GLUCOSE SERPL-MCNC: 139 MG/DL (ref 70–100)
GLUCOSE SERPL-MCNC: 143 MG/DL (ref 70–100)
GLUCOSE SERPL-MCNC: 157 MG/DL (ref 70–99)
GLUCOSE SERPL-MCNC: 159 MG/DL (ref 70–100)
GLUCOSE SERPL-MCNC: 167 MG/DL (ref 70–100)
GLUCOSE SERPL-MCNC: 210 MG/DL (ref 70–100)
GLUCOSE SERPL-MCNC: 223 MG/DL (ref 70–100)
GLUCOSE SERPL-MCNC: 229 MG/DL (ref 70–100)
GLUCOSE SERPL-MCNC: 297 MG/DL (ref 70–100)
GLUCOSE SERPL-MCNC: 94 MG/DL (ref 70–100)
HBA1C MFR BLD: 6.3 % (ref 4–5.6)
HBA1C MFR BLD: 7.3 % (ref 4–5.6)
HCT VFR BLD AUTO: 41.6 % (ref 35–46)
HCT VFR BLD AUTO: 42.3 % (ref 35–46)
HCT VFR BLD AUTO: 43.2 % (ref 35–46)
HCT VFR BLD AUTO: 43.2 % (ref 35–46)
HCT VFR BLD AUTO: 43.7 % (ref 35–46)
HEMOGLOBIN: 12.2 G/DL (ref 11.8–15.5)
HEMOGLOBIN: 12.3 G/DL (ref 11.8–15.5)
HEMOGLOBIN: 12.3 G/DL (ref 11.8–15.5)
HEMOGLOBIN: 12.5 G/DL (ref 11.8–15.5)
HEMOGLOBIN: 12.6 G/DL (ref 11.8–15.5)
HEMOGLOBIN: 12.9 G/DL (ref 11.8–15.5)
HEMOGLOBIN: 13 G/DL (ref 11.8–15.5)
HEMOGLOBIN: 13 G/DL (ref 11.8–15.5)
HEMOGLOBIN: 13.3 G/DL (ref 11.7–15.7)
HEMOGLOBIN: 13.3 G/DL (ref 11.8–15.5)
HEMOGLOBIN: 13.3 G/DL (ref 11.8–15.5)
MCV RBC AUTO: 100.2 FL (ref 80–100)
MCV RBC AUTO: 100.2 FL (ref 80–100)
MCV RBC AUTO: 94.6 FL (ref 80–100)
MCV RBC AUTO: 97.5 FL (ref 80–100)
MCV RBC AUTO: 97.7 FL (ref 80–100)
MCV RBC AUTO: 99.3 FL (ref 80–100)
MCV RBC AUTO: 99.5 FL (ref 80–100)
PLATELET # BLD AUTO: 138 K/CMM (ref 140–450)
PLATELET # BLD AUTO: 138 K/CMM (ref 140–450)
PLATELET # BLD AUTO: 162 K/CMM (ref 140–450)
PLATELET # BLD AUTO: 188 K/CMM (ref 140–450)
PLATELET # BLD AUTO: 200 K/CMM (ref 140–450)
PLATELET # BLD AUTO: 202 K/CMM (ref 140–450)
PLATELET # BLD AUTO: 218 K/CMM (ref 140–450)
POTASSIUM SERPL-SCNC: 3.2 MMOL/L (ref 3.5–5.2)
POTASSIUM SERPL-SCNC: 3.2 MMOL/L (ref 3.5–5.2)
POTASSIUM SERPL-SCNC: 3.5 MMOL/L (ref 3.5–5.2)
POTASSIUM SERPL-SCNC: 3.9 MMOL/L (ref 3.5–5.2)
POTASSIUM SERPL-SCNC: 4.1 MMOL/L (ref 3.5–5.2)
POTASSIUM SERPL-SCNC: 4.2 MMOL/L (ref 3.5–5.2)
POTASSIUM SERPL-SCNC: 4.3 MMOL/L (ref 3.5–5.2)
POTASSIUM SERPL-SCNC: 4.4 MMOL/L (ref 3.5–5.2)
POTASSIUM SERPL-SCNC: 4.5 MMOL/L (ref 3.5–5.2)
POTASSIUM SERPL-SCNC: 4.6 MMOL/L (ref 3.5–5.2)
POTASSIUM SERPL-SCNC: 4.6 MMOL/L (ref 3.5–5.2)
POTASSIUM SERPL-SCNC: 4.9 MMOL/L (ref 3.5–5.2)
POTASSIUM SERPL-SCNC: 5 MMOL/L (ref 3.5–5.2)
POTASSIUM SERPL-SCNC: 5.2 MMOL/L (ref 0.5–5.2)
POTASSIUM SERPL-SCNC: 5.2 MMOL/L (ref 3.5–5.2)
POTASSIUM SERPL-SCNC: 5.3 MMOL/L (ref 3.5–5.2)
POTASSIUM SERPL-SCNC: 5.9 MMOL/L (ref 3.5–5.2)
RBC # BLD AUTO: 4.18 M/CMM (ref 3.7–5.2)
RBC # BLD AUTO: 4.22 M/CMM (ref 3.7–5.2)
RBC # BLD AUTO: 4.22 M/CMM (ref 3.7–5.2)
RBC # BLD AUTO: 4.33 M/CMM (ref 3.7–5.2)
RBC # BLD AUTO: 4.35 M/CMM (ref 3.7–5.2)
RBC # BLD AUTO: 4.43 M/CMM (ref 3.7–5.2)
RBC # BLD AUTO: 4.62 M/CMM (ref 3.7–5.2)
SODIUM SERPL-SCNC: 133 MMOL/L (ref 136–145)
SODIUM SERPL-SCNC: 134 MMOL/L (ref 136–145)
SODIUM SERPL-SCNC: 135 MMOL/L (ref 136–145)
SODIUM SERPL-SCNC: 136 MMOL/L (ref 136–145)
SODIUM SERPL-SCNC: 136 MMOL/L (ref 136–1455)
SODIUM SERPL-SCNC: 137 MMOL/L (ref 136–145)
SODIUM SERPL-SCNC: 138 MMOL/L (ref 136–145)
SODIUM SERPL-SCNC: 139 MMOL/L (ref 136–145)
SODIUM SERPL-SCNC: 140 MMOL/L (ref 136–145)
SODIUM SERPL-SCNC: 141 MMOL/L (ref 136–145)
SODIUM SERPL-SCNC: 141 MMOL/L (ref 136–145)
WBC # BLD AUTO: 5.6 K/CMM (ref 3.8–11)
WBC # BLD AUTO: 5.9 K/CMM (ref 3.8–11)
WBC # BLD AUTO: 6 K/CMM (ref 3.8–11)
WBC # BLD AUTO: 6 K/CMM (ref 3.8–11)
WBC # BLD AUTO: 6.3 K/CMM (ref 3.8–11)
WBC # BLD AUTO: 6.4 K/CMM (ref 3.8–11)
WBC # BLD AUTO: 6.6 K/CMM (ref 3.8–11)

## 2018-01-01 PROCEDURE — 99309 SBSQ NF CARE MODERATE MDM 30: CPT | Performed by: NURSE PRACTITIONER

## 2018-01-01 PROCEDURE — 99310 SBSQ NF CARE HIGH MDM 45: CPT | Performed by: NURSE PRACTITIONER

## 2018-01-01 PROCEDURE — 99309 SBSQ NF CARE MODERATE MDM 30: CPT | Mod: GW | Performed by: NURSE PRACTITIONER

## 2018-01-01 PROCEDURE — 99318 ZZC ANNUAL NURSING FAC ASSESSMNT, STABLE: CPT | Performed by: NURSE PRACTITIONER

## 2018-01-01 RX ORDER — FUROSEMIDE 20 MG
TABLET ORAL
Qty: 90 TABLET | Refills: 4
Start: 2018-01-01 | End: 2018-01-01

## 2018-01-01 RX ORDER — PANTOPRAZOLE SODIUM 40 MG/1
40 TABLET, DELAYED RELEASE ORAL EVERY OTHER DAY
Qty: 180 TABLET | Refills: 3
Start: 2018-01-01 | End: 2019-01-01

## 2018-01-01 RX ORDER — POTASSIUM CHLORIDE 750 MG/1
20 TABLET, EXTENDED RELEASE ORAL DAILY
Qty: 90 TABLET | Refills: 1
Start: 2018-01-01

## 2018-01-01 RX ORDER — MIRTAZAPINE 15 MG/1
15 TABLET, FILM COATED ORAL AT BEDTIME
Qty: 30 TABLET | Refills: 1
Start: 2018-01-01 | End: 2018-01-01

## 2018-01-01 RX ORDER — MIRTAZAPINE 15 MG/1
7.5 TABLET, FILM COATED ORAL AT BEDTIME
Qty: 30 TABLET | Refills: 1
Start: 2018-01-01 | End: 2018-01-01

## 2018-01-01 RX ORDER — POTASSIUM CHLORIDE 750 MG/1
20 TABLET, EXTENDED RELEASE ORAL 2 TIMES DAILY
Qty: 90 TABLET | Refills: 1
Start: 2018-01-01 | End: 2018-01-01

## 2018-01-01 RX ORDER — TRAZODONE HYDROCHLORIDE 50 MG/1
50 TABLET, FILM COATED ORAL AT BEDTIME
Qty: 30 TABLET | Refills: 1
Start: 2018-01-01

## 2018-01-01 RX ORDER — METOLAZONE 5 MG/1
5 TABLET ORAL DAILY
Qty: 3 TABLET | Refills: 0
Start: 2018-01-01 | End: 2018-01-01

## 2018-01-01 RX ORDER — POTASSIUM CHLORIDE 750 MG/1
20 TABLET, EXTENDED RELEASE ORAL DAILY
Qty: 90 TABLET | Refills: 1
Start: 2018-01-01 | End: 2018-01-01

## 2018-01-01 RX ORDER — BISACODYL 10 MG
SUPPOSITORY, RECTAL RECTAL
Qty: 25 SUPPOSITORY | Refills: 1
Start: 2018-01-01

## 2018-01-01 RX ORDER — LEVALBUTEROL INHALATION SOLUTION 0.63 MG/3ML
1 SOLUTION RESPIRATORY (INHALATION) 2 TIMES DAILY
COMMUNITY
Start: 2018-01-01 | End: 2018-01-01

## 2018-01-01 RX ORDER — POLYETHYLENE GLYCOL 3350 17 G/17G
1 POWDER, FOR SOLUTION ORAL DAILY
Qty: 510 G | Refills: 1
Start: 2018-01-01

## 2018-01-01 RX ORDER — DOXYCYCLINE 100 MG/1
100 CAPSULE ORAL 2 TIMES DAILY
Qty: 14 CAPSULE | Refills: 0
Start: 2018-01-01 | End: 2019-01-01

## 2018-01-01 RX ORDER — GUAIFENESIN 600 MG/1
600 TABLET, EXTENDED RELEASE ORAL EVERY 12 HOURS
COMMUNITY
Start: 2018-01-01 | End: 2019-01-01

## 2018-01-01 RX ORDER — POTASSIUM CHLORIDE 750 MG/1
30 TABLET, EXTENDED RELEASE ORAL 2 TIMES DAILY
Qty: 90 TABLET | Refills: 1
Start: 2018-01-01 | End: 2018-01-01

## 2018-01-01 RX ORDER — METOLAZONE 2.5 MG/1
TABLET ORAL
Qty: 30 TABLET | Refills: 1
Start: 2018-01-01 | End: 2018-01-01

## 2018-01-01 RX ORDER — LEVALBUTEROL INHALATION SOLUTION 0.63 MG/3ML
1 SOLUTION RESPIRATORY (INHALATION) EVERY 8 HOURS PRN
COMMUNITY
End: 2018-01-01

## 2018-01-01 RX ORDER — METOPROLOL TARTRATE 25 MG/1
25 TABLET, FILM COATED ORAL 2 TIMES DAILY
Qty: 90 TABLET | Refills: 3
Start: 2018-01-01

## 2018-01-01 RX ORDER — LEVALBUTEROL INHALATION SOLUTION 0.63 MG/3ML
1 SOLUTION RESPIRATORY (INHALATION) EVERY 8 HOURS PRN
Qty: 360 ML
Start: 2018-01-01 | End: 2018-01-01

## 2018-01-01 RX ORDER — FUROSEMIDE 20 MG
60 TABLET ORAL DAILY
Qty: 60 TABLET | Refills: 1
Start: 2018-01-01

## 2018-01-01 RX ORDER — LORAZEPAM 0.5 MG/1
TABLET ORAL
Qty: 4 TABLET | Refills: 0
Start: 2018-01-01

## 2018-01-01 RX ORDER — MIRTAZAPINE 15 MG/1
TABLET, FILM COATED ORAL
Qty: 30 TABLET | Refills: 1 | Status: SHIPPED | OUTPATIENT
Start: 2018-01-01 | End: 2018-01-01

## 2018-01-01 RX ORDER — LEVALBUTEROL INHALATION SOLUTION 0.63 MG/3ML
1 SOLUTION RESPIRATORY (INHALATION) 2 TIMES DAILY
Qty: 360 ML
Start: 2018-01-01

## 2018-01-01 RX ORDER — POLYETHYLENE GLYCOL 3350 17 G/17G
1 POWDER, FOR SOLUTION ORAL EVERY OTHER DAY
Qty: 510 G | Refills: 1
Start: 2018-01-01 | End: 2018-01-01

## 2018-01-01 RX ORDER — PREDNISONE 10 MG/1
TABLET ORAL
Start: 2018-01-01 | End: 2018-01-01

## 2018-01-01 ASSESSMENT — MIFFLIN-ST. JEOR
SCORE: 908.95
SCORE: 1416.98
SCORE: 1475.95
SCORE: 1448.74

## 2018-01-01 ASSESSMENT — PATIENT HEALTH QUESTIONNAIRE - PHQ9: SUM OF ALL RESPONSES TO PHQ QUESTIONS 1-9: 24

## 2018-01-29 VITALS
HEIGHT: 66 IN | TEMPERATURE: 97.5 F | SYSTOLIC BLOOD PRESSURE: 122 MMHG | HEART RATE: 98 BPM | DIASTOLIC BLOOD PRESSURE: 84 MMHG | BODY MASS INDEX: 37.77 KG/M2 | WEIGHT: 235 LBS

## 2018-01-29 NOTE — PROGRESS NOTES
[facility]    Chief Complaint   Patient presents with     RECHECK     HPI:    Mireille Taylor is a 88 year old  (8/2/1929), who is being seen today for an episodic care visit at [facility]  HPI information obtained from: facility chart records, facility staff, patient report and [facility] chart review.Today's concern is:  Chronic low back pain with bilateral sciatica, unspecified back pain laterality  Pt has long standing h/o chronic pain with dependency on narcotics and benzodiazepines.  Recent dc of prn valium.  Continues on low dose oxycodone bid.  Receiving prn oxycodone most days during the night or early morning.    Type 2 diabetes mellitus without complication, without long-term current use of insulin (H)  Fasting accuchecks this month: 157-165. 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  No recent changes to levothyroxine dose. Labs checked last February.    Recurrent major depressive disorder, in partial remission (H)  No recent changes to Celexa dose.  PHQ 9 score of 8.    Chronic atrial fibrillation (H)  Remains on metoprolol for rate control and ASA for anticoagulation. No reports of heart rates >100/min.    LE Edema  Lasix dose increased in October.  Edema is at baseline    Left leg lesion  Biopsied in October.  BCC felt to be the cause and f/u scheduled in March.    ALLERGIES: Bactrim [sulfamethoxazole w-trimethoprim]; Gabapentin; Lyrica; Methadone; Nsaids; Penicillin g; Tramadol; Cephalexin hcl; Clindamycin hcl; and Macrobid [nitrofurantoin]  Past Medical, Surgical, Family and Social History reviewed and updated in [facility].    Current Outpatient Prescriptions   Medication Sig Dispense Refill     diazepam (VALIUM) 5 MG tablet 2.5 mg at HS 60 tablet      oxyCODONE IR (ROXICODONE) 5 MG tablet Take 0.5 tablets (2.5 mg) by mouth 2 times daily And 5 mg q6h prn 18 tablet 0     levalbuterol (XOPENEX) 0.63 MG/3ML neb  solution Take 3 mLs (0.63 mg) by nebulization every 8 hours as needed for shortness of breath / dyspnea or wheezing And bid for two weeks. 360 mL      furosemide (LASIX) 20 MG tablet Take 2 tablets (40 mg) by mouth 2 times daily 90 tablet 4     potassium chloride SA (K-DUR/KLOR-CON M) 10 MEQ CR tablet Take 2 tablets (20 mEq) by mouth daily 90 tablet 1     metoprolol (LOPRESSOR) 25 MG tablet Take 0.5 tablets (12.5 mg) by mouth 2 times daily Hold for systolic BP <100 or pulse <60. 90 tablet 3     chlorhexidine (PERIDEX) 0.12 % solution Swish and spit in mouth 2 times daily Rinse with 1/2 oz BID for 30 secs and expectorate       estradiol (ESTRACE VAGINAL) 0.1 MG/GM cream Place 2 g vaginally three times a week 42.5 g      Cranberry (CRANBERRY CONCENTRATE) 500 MG CAPS Take 1 capsule (500 mg) by mouth 2 times daily 90 capsule      VITAMIN D, CHOLECALCIFEROL, PO Take 2,000 Units by mouth daily       Selenium Sulfide 2.25 % SHAM Externally apply 1 Application topically twice a week       Multiple Vitamins-Minerals (PRESERVISION AREDS 2 PO) Take 1 tablet by mouth 2 times daily       Pseudoephedrine-DM-GG (ROBITUSSIN CF PO) Take 5 mLs by mouth 4 times daily as needed        Acetaminophen (TYLENOL PO) Take 650 mg by mouth 4 times daily       polyethylene glycol (MIRALAX/GLYCOLAX) powder Take 17 g by mouth daily as needed for constipation 119 g      metFORMIN (GLUCOPHAGE) 500 MG tablet Take 1 tablet (500 mg) by mouth 2 times daily (with meals) 180 tablet 1     pantoprazole (PROTONIX) 40 MG enteric coated tablet Take 1 tablet (40 mg) by mouth daily Take 30-60 minutes before a meal. 180 tablet 3     levothyroxine (SYNTHROID, LEVOTHROID) 125 MCG tablet Take 1 tablet (125 mcg) by mouth daily 90 tablet 1     sodium chloride ([employer]S SALINE NASAL SPRAY) 0.65 % nasal spray Spray 2 sprays into both nostrils 2 times daily       aspirin 81 MG EC tablet Take 1 tablet (81 mg) by mouth daily 90 tablet 3     Ascorbic Acid 1000 MG TABS Take  1,000 mg by mouth daily       citalopram (CELEXA) 40 MG tablet TAKE 1 TABLET BY MOUTH DAILY 90 tablet 1     methenamine hippurate (HIPREX) 1 G TABS TAKE 1 TABLET BY MOUTH ONCE A DAY.  TAKE ALONG WITH 1000MG VITAMIN-C 180 tablet 4     mirabegron (MYRBETRIQ) 50 MG 24 hr tablet Take 1 tablet (50 mg) by mouth daily 30 tablet 11     Medications reviewed:  Medications reconciled to facility chart and changes were made to reflect current medications as identified as above med list. Below are the changes that were made:   Medications stopped since last EPIC medication reconciliation:   There are no discontinued medications.    Medications started since last [facility] medication reconciliation:  No orders of the defined types were placed in this encounter.    Patient Active Problem List   Diagnosis     GERD (gastroesophageal reflux disease)     Systolic murmur     CARDIOVASCULAR SCREENING; LDL GOAL LESS THAN 100     JAMES on CPAP     Osteoporosis     Peripheral neuropathy     Anxiety     Vitamin D deficiency     Mixed incontinence urge and stress     Health Care Home     Frequent UTI, Dr Bigg Muhammad, Urologist     Candidiasis of skin     Dependent edema     Benzodiazepine dependence, pt reports daily use of valium since the 1930's.     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     Chronic low back pain with bilateral sciatica     Macular degeneration (senile) of retina     Recurrent major depressive disorder, in partial remission (H)     Seborrheic dermatitis     Morbid obesity (H)     BMI 35.0-35.9,adult     Leg skin lesion, left     Chronic atrial fibrillation (H)     Reactive airway disease without complication       REVIEW OF SYSTEMS:  Negative selected, CONSTITUTIONAL: weight loss, weight gain, poor appetite and fevers, EYES: tearing, redness and Positive for eyeglasses and macular degeneration.  Followed by   "[facility]: [place], dysphagia and positive for h/o epistaxis,  CV: chest pain and Positive for dependent edema and now weight gain with irregular heartrate.., RESPIRATORY: , Positive for occasional cough.  No SOB., : Positive for frequent UTI's and dribble incontinence. Has implanted stimulator for bladder control.... GI: N&V.  Positive for occasional loose stools and constipation.  H/o occ rectal bleeding.. NEURO: headaches and Positive for variable memory issues - son reports it is dependent on how much valium and oxycodone she has taken & pt agrees and MUSCULOSKELETAL: Positive for chronic back pain .    Physical Exam:  /84  Pulse 98  Temp 97.5  F (36.4  C)  Ht 5' 6\" (1.676 m)  Wt 235 lb (106.6 kg)  BMI 37.93 kg/m2  GENERAL APPEARANCE: Alert, overweight female seen.  Up in custom scooter.  Well groomed. .  Able to  express self verbally. Pale facial color. Some forgetfulness of past details.   HEAD:  Normocephalic.  No facial asymmetry..  ENT: Mouth and posterior oropharynx normal, moist mucous membranes, .  EYES: EOM normal, conjunctiva and lids normal.   RESP:  No cough during visit.   Diminished lung sounds bilateral lower lobes, with crackles at bases (per baseline), but no wheezes or rhonchi today.  Old surgical scar from thyroidectomy visible at base of neck and upper chest..  CV: Irregularly, irregular rate and rhythm, 2/6 systolic murmur. +2 ankle and pedal edema. DP pulses +1 bilateral and MAHAMED stocking put back on.  No pedal wounds._  ABDOMEN: Large, rounded abdomen. BS's positive all 4 quadrants. No discomfort with palpation of abdomen including suprapubic area.  SKIN:  Left posterior lower leg incision has healed following lesion removal.  Dry skin remains over site and area is slightly reddened. .  Pt has post op appt in February for evaluation of both this site and punch biopsy site on back.   MUSC:  NO pain with palpation of spinal process or with ROM of lower extremities and hips. " .  NEURO:  Alert and oriented X3.  Purposeful movement of extremities without focal weakness, although legs are weaker than arms due to deconditioning. No tremors or cogwheeling.  Strong hand grasp bilaterally.  PSYCH:  Positive and pleased that oxycodone dose will be increased.     Recent Labs:   CBC RESULTS:   Recent Labs   Lab Test 12/21/17 11/09/17 10/19/17 10/05/17   09/21/13   1455   06/01/11   1845   WBC   --    --   8.8  9.3   < >  10.9   --    --    RBC   --    --   4.35  4.35   < >  4.77   --    --    HGB  13.9  13.9  13.3  13.2   < >  14.9   --   15.0   HCT   --    --   42.6  42.1   < >  43.1   --   49.2   MCV   --    --   97.9  96.8   < >  90   --   94.1   MCH   --    --    --    --    --   31.2   --   28.7   MCHC   --    --    --    --    --   34.6   --   30.5*   RDW   --    --   13.1  13.4   < >  12.7   --   13.7   PLT   --    --   201  153   < >  128*   < >   --     < > = values in this interval not displayed.       Last Basic Metabolic Panel:  Recent Labs   Lab Test 12/21/17 11/21/17 11/09/17   NA  141  139  143   POTASSIUM  4.2  4.4  4.4   CHLORIDE  97*  98  99   KURTSI   --   10.0  10.4*   CO2  35*  32*  35*   BUN   --   16  23   CR   --   0.81  0.88   GLC   --   213*  155*     GFR Estimate   Date Value Ref Range Status   11/21/2017 >60 >60 ml/min/1.73m2 Final   11/09/2017 59 (L) >60 ml/min/1.73m2 Final   10/26/2017 >60 >60 mL/min/1.73m2 Final   10/23/2017 >60 >60 ml/min/1.73m2 Final   10/19/2017 >60 >60 ml/min/1.73m2 Final         Lab Results   Component Value Date    A1C 6.0 08/21/2017    A1C 5.8 02/02/2017 12/21/17:  HgbA1C: 6.9%    TSH   Date Value Ref Range Status   02/02/2017 0.60 0.20 - 4.50 mcU/mL Final   02/02/2017 0.60 0.20 - 4.50 ulU/ml Final   ]  Assessment/Plan:  (M54.41,  G89.29,  M54.42) Chronic low back pain with bilateral sciatica, unspecified back pain laterality  (primary encounter diagnosis)  Comment: Ongoing  Plan: Increase morning oxycodone dose to 5 mg.  Dr. Leon to see in  one week and will evaluate effectiveness.    (E11.9) Type 2 diabetes mellitus without complication, without long-term current use of insulin (H)  Comment: Chronic, HgbA1C goal <8%, at goal  Plan: Continue current POC.  HgbA1C next Monday.    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  Comment: Chronic  Plan: Continue levothyroxine dose.  TSH and Free T4 next Monday.    (F33.41) Recurrent major depressive disorder, in partial remission (H)  Comment: Chronic  Plan: Continue citalopram dose.  Monitor    (I48.2) Chronic atrial fibrillation (H)  Comment: Rate controlled by metoprolol  Plan: Continue metoprolol and ASA.  Not a coumadin candidate due to her fall risk and comorbid factors.    (R60.9) Dependent edema  Comment: Chronic  Plan: Continue lasix and TEDS.  BMP on Monday.    (L98.9) Leg skin lesion, left  Comment: Followed by dermatology  Plan: F/U as recommended.     Electronically signed by  SABINE Borjas CNP

## 2018-01-30 ENCOUNTER — NURSING HOME VISIT (OUTPATIENT)
Dept: GERIATRICS | Facility: CLINIC | Age: 83
End: 2018-01-30
Payer: COMMERCIAL

## 2018-01-30 DIAGNOSIS — G89.29 CHRONIC LOW BACK PAIN WITH BILATERAL SCIATICA, UNSPECIFIED BACK PAIN LATERALITY: ICD-10-CM

## 2018-01-30 DIAGNOSIS — L98.9 LEG SKIN LESION, LEFT: ICD-10-CM

## 2018-01-30 DIAGNOSIS — R60.9 DEPENDENT EDEMA: Chronic | ICD-10-CM

## 2018-01-30 DIAGNOSIS — E89.0 POSTSURGICAL HYPOTHYROIDISM: ICD-10-CM

## 2018-01-30 DIAGNOSIS — E11.9 TYPE 2 DIABETES MELLITUS WITHOUT COMPLICATION, WITHOUT LONG-TERM CURRENT USE OF INSULIN (H): ICD-10-CM

## 2018-01-30 DIAGNOSIS — M54.41 CHRONIC LOW BACK PAIN WITH BILATERAL SCIATICA, UNSPECIFIED BACK PAIN LATERALITY: Primary | ICD-10-CM

## 2018-01-30 DIAGNOSIS — I48.20 CHRONIC ATRIAL FIBRILLATION (H): ICD-10-CM

## 2018-01-30 DIAGNOSIS — M54.41 CHRONIC LOW BACK PAIN WITH BILATERAL SCIATICA, UNSPECIFIED BACK PAIN LATERALITY: ICD-10-CM

## 2018-01-30 DIAGNOSIS — G89.29 CHRONIC LOW BACK PAIN WITH BILATERAL SCIATICA, UNSPECIFIED BACK PAIN LATERALITY: Primary | ICD-10-CM

## 2018-01-30 DIAGNOSIS — M54.42 CHRONIC LOW BACK PAIN WITH BILATERAL SCIATICA, UNSPECIFIED BACK PAIN LATERALITY: Primary | ICD-10-CM

## 2018-01-30 DIAGNOSIS — F33.41 RECURRENT MAJOR DEPRESSIVE DISORDER, IN PARTIAL REMISSION (H): ICD-10-CM

## 2018-01-30 DIAGNOSIS — M54.42 CHRONIC LOW BACK PAIN WITH BILATERAL SCIATICA, UNSPECIFIED BACK PAIN LATERALITY: ICD-10-CM

## 2018-01-30 PROCEDURE — 99309 SBSQ NF CARE MODERATE MDM 30: CPT | Performed by: NURSE PRACTITIONER

## 2018-01-30 RX ORDER — OXYCODONE HYDROCHLORIDE 5 MG/1
TABLET ORAL
Qty: 18 TABLET | Refills: 0
Start: 2018-01-30 | End: 2018-01-01

## 2018-01-30 RX ORDER — OXYCODONE HYDROCHLORIDE 5 MG/1
TABLET ORAL
Qty: 18 TABLET | Refills: 0
Start: 2018-01-30 | End: 2018-01-30

## 2018-02-05 ENCOUNTER — TRANSFERRED RECORDS (OUTPATIENT)
Dept: HEALTH INFORMATION MANAGEMENT | Facility: CLINIC | Age: 83
End: 2018-02-05

## 2018-02-05 ENCOUNTER — NURSING HOME VISIT (OUTPATIENT)
Dept: GERIATRICS | Facility: CLINIC | Age: 83
End: 2018-02-05
Payer: COMMERCIAL

## 2018-02-05 DIAGNOSIS — I48.20 CHRONIC ATRIAL FIBRILLATION (H): ICD-10-CM

## 2018-02-05 DIAGNOSIS — M54.42 CHRONIC LOW BACK PAIN WITH BILATERAL SCIATICA, UNSPECIFIED BACK PAIN LATERALITY: Primary | ICD-10-CM

## 2018-02-05 DIAGNOSIS — G89.29 CHRONIC LOW BACK PAIN WITH BILATERAL SCIATICA, UNSPECIFIED BACK PAIN LATERALITY: Primary | ICD-10-CM

## 2018-02-05 DIAGNOSIS — M54.41 CHRONIC LOW BACK PAIN WITH BILATERAL SCIATICA, UNSPECIFIED BACK PAIN LATERALITY: Primary | ICD-10-CM

## 2018-02-05 DIAGNOSIS — E11.9 TYPE 2 DIABETES MELLITUS WITHOUT COMPLICATION, WITHOUT LONG-TERM CURRENT USE OF INSULIN (H): ICD-10-CM

## 2018-02-05 LAB
BUN SERPL-MCNC: 20 MG/DL (ref 9–26)
CALCIUM SERPL-MCNC: 10.1 MG/DL (ref 8.4–10.2)
CHLORIDE SERPLBLD-SCNC: 98 MMOL/L (ref 98–109)
CO2 SERPL-SCNC: 31 MMOL/L (ref 22–31)
CREAT SERPL-MCNC: 0.76 MG/DL (ref 0.55–1.02)
GFR SERPL CREATININE-BSD FRML MDRD: >60 ML/MIN/1.73M2
GLUCOSE SERPL-MCNC: 143 MG/DL (ref 70–100)
HBA1C MFR BLD: 6.9 % (ref 4–5.6)
HEMOGLOBIN: 14.5 G/DL (ref 11.8–15.5)
POTASSIUM SERPL-SCNC: 4.3 MMOL/L (ref 3.5–5.2)
SODIUM SERPL-SCNC: 139 MMOL/L (ref 136–145)
TSH SERPL-ACNC: 2.94 UIU/ML (ref 0.3–4.5)

## 2018-02-07 NOTE — PROGRESS NOTES
Pt was seen for a regulatory LTC visit  Case reviewed with NP    Pt has noted increased low back and B LE pain  She has been waking up from sleep early am with pain, has been receiving prn in addition to scheduled bid oxycodone    She denies chest pain, SOB or abd pain  LE edema improved since lasix increased    VSS  Alert, sitting in WC, appears comfortable  Lungs clear  CV irreg  Abd soft  No LE edema  No gross LE weakness    Assessment    Chronic low back and LE pain  DM type 2, stable on metformin  Chronic afib, HR controlled on ASA   Chronic LE edema, controlled with lasix    Plan  Increase hs scheduled oxycodone, monitor response to tx  Continue to monitor BP, BMP

## 2018-04-10 PROBLEM — G31.84 MILD COGNITIVE IMPAIRMENT WITH MEMORY LOSS: Status: ACTIVE | Noted: 2018-01-01

## 2018-04-23 NOTE — PROGRESS NOTES
[facility]    Chief Complaint   Patient presents with     RECHECK     HPI:    Mireille Taylor is a 88 year old  (8/2/1929), who is being seen today for an episodic care visit at [facility]  HPI information obtained from: facility chart records, facility staff, patient report and [facility] chart review.Today's concern is:  Insomnia, unspecified type  Pt reports interrupted sleep at night.  During that time she reports pain in her back and down both legs.  Pain is described as burning feeling.  History shows allergy to gabapentin and lyrica, although she does not remember taking it or the reaction.  Has used both oxycodone and valium for many years and recognizes that when she requests the pain medication, it is likely more for rest that tx of pain.  Discussed possible for increase of HS remeron, which she agrees to try.    Anxiety  Remains on HS valium.  SHe admits to occasional nighttime anxiety, which she also treats with the prn oxycodone.    Benzodiazepine dependence, pt reports daily use of valium since the 1930's.  PRN valium removed from medication due to [place] regulations about prn benzodiazepines.  Remains on HS dose.    Mild cognitive impairment with memory loss  Increasing cognitive impairment.  She recognizes this.  Will often not remember that I had just visited or the plan we agreed to.    Type 2 diabetes mellitus without complication, without long-term current use of insulin (H)  Remains on metformin.  Fasting accucheck this month was 202.      ALLERGIES: Bactrim [sulfamethoxazole w-trimethoprim]; Gabapentin; Lyrica; Methadone; Nsaids; Penicillin g; Tramadol; Cephalexin hcl; Clindamycin hcl; and Macrobid [nitrofurantoin]  Past Medical, Surgical, Family and Social History reviewed and updated in [facility].    Current Outpatient Prescriptions   Medication Sig Dispense Refill     Acetaminophen (TYLENOL PO) Take 650 mg by mouth 4 times daily       Ascorbic  Acid 1000 MG TABS Take 1,000 mg by mouth daily       aspirin 81 MG EC tablet Take 1 tablet (81 mg) by mouth daily 90 tablet 3     chlorhexidine (PERIDEX) 0.12 % solution Swish and spit in mouth 2 times daily Rinse with 1/2 oz BID for 30 secs and expectorate       citalopram (CELEXA) 40 MG tablet TAKE 1 TABLET BY MOUTH DAILY 90 tablet 1     Cranberry (CRANBERRY CONCENTRATE) 500 MG CAPS Take 1 capsule (500 mg) by mouth 2 times daily 90 capsule      diazepam (VALIUM) 5 MG tablet 2.5 mg at HS 60 tablet      estradiol (ESTRACE VAGINAL) 0.1 MG/GM cream Place 2 g vaginally three times a week 42.5 g      furosemide (LASIX) 20 MG tablet Take 2 tablets (40 mg) by mouth 2 times daily 90 tablet 4     levalbuterol (XOPENEX) 0.63 MG/3ML neb solution Take 3 mLs (0.63 mg) by nebulization every 8 hours as needed for shortness of breath / dyspnea or wheezing And bid for two weeks. 360 mL      levothyroxine (SYNTHROID, LEVOTHROID) 125 MCG tablet Take 1 tablet (125 mcg) by mouth daily 90 tablet 1     metFORMIN (GLUCOPHAGE) 500 MG tablet Take 1 tablet (500 mg) by mouth 2 times daily (with meals) 180 tablet 1     methenamine hippurate (HIPREX) 1 G TABS TAKE 1 TABLET BY MOUTH ONCE A DAY.  TAKE ALONG WITH 1000MG VITAMIN-C 180 tablet 4     metoprolol (LOPRESSOR) 25 MG tablet Take 0.5 tablets (12.5 mg) by mouth 2 times daily Hold for systolic BP <100 or pulse <60. 90 tablet 3     mirabegron (MYRBETRIQ) 50 MG 24 hr tablet Take 1 tablet (50 mg) by mouth daily 30 tablet 11     mirtazapine (REMERON) 15 MG tablet Take 0.5 tablets (7.5 mg) by mouth At Bedtime 30 tablet 1     Multiple Vitamins-Minerals (PRESERVISION AREDS 2 PO) Take 1 tablet by mouth 2 times daily       OxyCODONE HCl (ROXICODONE PO) Take 5 mg by mouth 2 times daily Give at 0630 and 2100. And q6h prn       pantoprazole (PROTONIX) 40 MG enteric coated tablet Take 1 tablet (40 mg) by mouth daily Take 30-60 minutes before a meal. 180 tablet 3     polyethylene glycol (MIRALAX/GLYCOLAX)  powder Take 17 g by mouth daily as needed for constipation 119 g      potassium chloride SA (K-DUR/KLOR-CON M) 10 MEQ CR tablet Take 2 tablets (20 mEq) by mouth daily 90 tablet 1     Pseudoephedrine-DM-GG (ROBITUSSIN CF PO) Take 5 mLs by mouth 4 times daily as needed        Selenium Sulfide 2.25 % SHAM Externally apply 1 Application topically twice a week       sodium chloride (CVS SALINE NASAL SPRAY) 0.65 % nasal spray Spray 2 sprays into both nostrils 2 times daily       VITAMIN D, CHOLECALCIFEROL, PO Take 2,000 Units by mouth daily       Medications reviewed:  Medications reconciled to facility chart and changes were made to reflect current medications as identified as above med list. Below are the changes that were made:   Medications stopped since last EPIC medication reconciliation:   There are no discontinued medications.    Medications started since last [facility] medication reconciliation:  No orders of the defined types were placed in this encounter.    Patient Active Problem List   Diagnosis     GERD (gastroesophageal reflux disease)     Systolic murmur     CARDIOVASCULAR SCREENING; LDL GOAL LESS THAN 100     JAMES on CPAP     Osteoporosis     Peripheral neuropathy     Anxiety     Vitamin D deficiency     Mixed incontinence urge and stress     Health Care Home     Frequent UTI, Dr Bigg Muhammad, Urologist     Candidiasis of skin     Dependent edema     Benzodiazepine dependence, pt reports daily use of valium since the 1930's.     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     Chronic low back pain with bilateral sciatica     Macular degeneration (senile) of retina     Recurrent major depressive disorder, in partial remission (H)     Seborrheic dermatitis     Morbid obesity (H)     BMI 35.0-35.9,adult     Leg skin lesion, left     Chronic atrial fibrillation (H)     Reactive airway disease without  "complication     Mild cognitive impairment with memory loss       REVIEW OF SYSTEMS:  Negative selected, CONSTITUTIONAL: weight loss, w, poor appetite and fevers, Positive for 30 lbs weight gain in past year. EYES: tearing, redness and Positive for eyeglasses and macular degeneration.  Followed by Dr. [facility] ENT: [place], dysphagia and positive for h/o epistaxis,  CV: chest pain and Positive for dependent edema and now weight gain with irregular heartrate.., RESPIRATORY: , Positive for occasional cough.  No SOB., : Positive for frequent UTI's and dribble incontinence. Has implanted stimulator for bladder control.... GI: N&V.  Positive for occasional loose stools and constipation.  H/o occ rectal bleeding.. NEURO: headaches and Positive for variable memory issues - son reports it is dependent on how much valium and oxycodone she has taken & pt agrees and MUSCULOSKELETAL: Positive for chronic back pain and right hand pain .    Physical Exam:  /82  Pulse 76  Temp 97.6  F (36.4  C)  Ht 5' 6\" (1.676 m)  Wt 242 lb (109.8 kg)  BMI 39.06 kg/m2  GENERAL APPEARANCE: Alert, overweight female seen.  Resting in bed with HOB elevated. Watching TV.  Well groomed. .  Able to  express self verbally. Pale facial color. Some forgetfulness of past details.   HEAD:  Normocephalic.  No facial asymmetry..  ENT: Mouth and posterior oropharynx normal, moist mucous membranes, .  EYES: EOM normal, conjunctiva and lids normal.   RESP:  Diminished lung sounds bilateral lower lobes, with scattered expiratory wheezes.  Old surgical scar from thyroidectomy visible at base of neck and upper chest..  CV: Irregularly, irregular rate and rhythm, 2/6 systolic murmur. +2 ankle and pedal edema. DP pulses +1 bilateral and MAHAMED stocking put back on.  No pedal wounds._  ABDOMEN: Large, rounded abdomen. BS's positive all 4 quadrants. No discomfort with palpation of abdomen including suprapubic area.  MUSC:  NO pain with palpation of spinal process or " with ROM of lower extremities and hips.   NEURO:  Alert and oriented X3.  Purposeful movement of extremities without focal weakness, although legs are weaker than arms due to deconditioning. No tremors or cogwheeling.  Strong hand grasp bilaterally.  PSYCH:  Lengthy discussion regarding current condition and mood.  Discussed possibility of increasing Remeron for depression and anxiety and she is interested in trying this.  If this is not effective discussed stopping the remeron and starting cymbalta for tx of both depression and pain.  Denies any suicidal thoughts.      Recent Labs:   CBC RESULTS:   Recent Labs   Lab Test 02/05/18 12/21/17  10/19/17 10/05/17   09/21/13   1455   06/01/11   1845   WBC   --    --    --   8.8  9.3   < >  10.9   --    --    RBC   --    --    --   4.35  4.35   < >  4.77   --    --    HGB  14.5  13.9   < >  13.3  13.2   < >  14.9   --   15.0   HCT   --    --    --   42.6  42.1   < >  43.1   --   49.2   MCV   --    --    --   97.9  96.8   < >  90   --   94.1   MCH   --    --    --    --    --    --   31.2   --   28.7   MCHC   --    --    --    --    --    --   34.6   --   30.5*   RDW   --    --    --   13.1  13.4   < >  12.7   --   13.7   PLT   --    --    --   201  153   < >  128*   < >   --     < > = values in this interval not displayed.       Last Basic Metabolic Panel:  Recent Labs   Lab Test 02/05/18 12/21/17 11/21/17   NA  139  141  139   POTASSIUM  4.3  4.2  4.4   CHLORIDE  98  97*  98   KURTIS  10.1   --   10.0   CO2  31  35*  32*   BUN  20   --   16   CR  0.76   --   0.81   GLC  143*   --   213*     GFR Estimate   Date Value Ref Range Status   02/05/2018 >60 >60 mL/min/1.73m2 Final   11/21/2017 >60 >60 ml/min/1.73m2 Final   11/09/2017 59 (L) >60 ml/min/1.73m2 Final   10/26/2017 >60 >60 mL/min/1.73m2 Final   10/23/2017 >60 >60 ml/min/1.73m2 Final       TSH   Date Value Ref Range Status   02/05/2018 2.94 0.30 - 4.50 uIU/mL Final   02/02/2017 0.60 0.20 - 4.50 mcU/mL Final   02/02/2017  0.60 0.20 - 4.50 ulU/ml Final       Lab Results   Component Value Date    A1C 6.9 02/05/2018    A1C 6.0 08/21/2017     Assessment/Plan:  (G47.00) Insomnia, unspecified type  (primary encounter diagnosis)  Comment: Ongoing episodic wakefulness  Plan: Increase Remeron to 15 mg qHS  Monitor for effect    (F41.9) Anxiety  Comment: Chronic and long standing  Plan: Same as above.    (F13.20) Benzodiazepine dependence, pt reports daily use of valium since the 1930's.  Comment: Chronic  Plan: Continue current dose of benzodiazepine.  Will not attempt dose increase.    (G31.84) Mild cognitive impairment with memory loss  Comment: Slowly advancing  Plan: Continue LTC in skilled facility and assistance with cares.    (E11.9) Type 2 diabetes mellitus without complication, without long-term current use of insulin (H)  Comment: Chronic  Plan: Increase accuchecks to bid for two weeks with update.    (G47.33,  Z99.89) JAMES on CPAP  Comment: Chronic  Plan: Check to see if pt would consider using this again. Could be contributor to poor sleep pattern.  Give xopenex nebs bid for one week and then return to prn.    Electronically signed by  SABINE Borjas CNP

## 2018-05-03 NOTE — PROGRESS NOTES
"[facility]    Chief Complaint   Patient presents with     detention Acute     [place] Medical Record Number:  [id number]    HPI:    Mireille Taylor is a 88 year old  (8/2/1929), who is being seen today for an episodic care visit at [facility].  HPI information obtained from: facility chart records, facility staff, patient report and [facility] chart review.Today's concern is:  Acute bronchitis with symptoms > 10 days  Pt has been ill with respiratory symptoms beginning on 4/24.  Xopenex nebs began, but pt has continued to decline with elevated blood sugars and now elevated ventricular heart rate and low oxygen sats to 81% on room air.  Remains on xopenex nebs.  Chest XRay on 4/30 did not show any acute disease, but this scenario occurred last October and pt declined until she received an antibiotic.    Hypoxia  O2 sats 81-83% on room air.  Pt reports feeling sweating and \"faint like\".    Atrial fibrillation with RVR (H)  On metoprolol bid.  Pulse ranges in past three days: 63 on 5/1 with trending to low 100's yesterday.  Today is rapid at 118-135.  Pt denies chest pain.    Type 2 diabetes mellitus without complication, without long-term current use of insulin (H)  Accuchecks have increased with this illness.  Metformin dose in creased on 4/30, but blood sugars remain elevated    CHF NYHA class III, unspecified failure chronicity, unspecified type (H)  Remains on lasix.  Continues with LE edema, but no increase in amount and weight is stable compared to the past two months.    JAMES on CPAP  No longer uses CPAP device.  Unclear what O2 sats are at night.    Mild cognitive impairment with memory loss  Staff have reported increased issues with confusion in past 4 days -  Unclear if it is related to hypoxia.    ALLERGIES: Bactrim [sulfamethoxazole w-trimethoprim]; Gabapentin; Lyrica; Methadone; Nsaids; Penicillin g; Tramadol; Cephalexin hcl; Clindamycin hcl; and Macrobid " [nitrofurantoin]  Past Medical, Surgical, Family and Social History reviewed and updated in [facility].    Current Outpatient Prescriptions   Medication Sig Dispense Refill     Acetaminophen (TYLENOL PO) Take 650 mg by mouth 4 times daily       Ascorbic Acid 1000 MG TABS Take 1,000 mg by mouth daily       aspirin 81 MG EC tablet Take 1 tablet (81 mg) by mouth daily 90 tablet 3     chlorhexidine (PERIDEX) 0.12 % solution Swish and spit in mouth 2 times daily Rinse with 1/2 oz BID for 30 secs and expectorate       citalopram (CELEXA) 40 MG tablet TAKE 1 TABLET BY MOUTH DAILY 90 tablet 1     Cranberry (CRANBERRY CONCENTRATE) 500 MG CAPS Take 1 capsule (500 mg) by mouth 2 times daily 90 capsule      diazepam (VALIUM) 5 MG tablet 2.5 mg at HS 60 tablet      estradiol (ESTRACE VAGINAL) 0.1 MG/GM cream Place 2 g vaginally three times a week 42.5 g      furosemide (LASIX) 20 MG tablet Take 2 tablets (40 mg) by mouth 2 times daily 90 tablet 4     guaiFENesin (MUCINEX) 600 MG 12 hr tablet Take 600 mg by mouth every 12 hours       levalbuterol (XOPENEX) 0.63 MG/3ML neb solution Take 3 mLs (0.63 mg) by nebulization every 8 hours as needed for shortness of breath / dyspnea or wheezing And bid for two weeks. 360 mL      levalbuterol (XOPENEX) 0.63 MG/3ML neb solution Take 1 ampule by nebulization 2 times daily       levothyroxine (SYNTHROID, LEVOTHROID) 125 MCG tablet Take 1 tablet (125 mcg) by mouth daily 90 tablet 1     metFORMIN (GLUCOPHAGE) 500 MG tablet Take 1.5 tablets (750 mg) by mouth 2 times daily (with meals) 180 tablet 1     methenamine hippurate (HIPREX) 1 G TABS TAKE 1 TABLET BY MOUTH ONCE A DAY.  TAKE ALONG WITH 1000MG VITAMIN-C 180 tablet 4     metoprolol (LOPRESSOR) 25 MG tablet Take 0.5 tablets (12.5 mg) by mouth 2 times daily Hold for systolic BP <100 or pulse <60. 90 tablet 3     mirabegron (MYRBETRIQ) 50 MG 24 hr tablet Take 1 tablet (50 mg) by mouth daily 30 tablet 11     mirtazapine (REMERON) 15 MG tablet Take 1  tablet (15 mg) by mouth At Bedtime 30 tablet 1     Multiple Vitamins-Minerals (PRESERVISION AREDS 2 PO) Take 1 tablet by mouth 2 times daily       OxyCODONE HCl (ROXICODONE PO) Take 5 mg by mouth 2 times daily Give at 0630 and 2100. And q6h prn       pantoprazole (PROTONIX) 40 MG enteric coated tablet Take 1 tablet (40 mg) by mouth daily Take 30-60 minutes before a meal. 180 tablet 3     polyethylene glycol (MIRALAX/GLYCOLAX) powder Take 17 g by mouth daily as needed for constipation 119 g      potassium chloride SA (K-DUR/KLOR-CON M) 10 MEQ CR tablet Take 2 tablets (20 mEq) by mouth daily 90 tablet 1     Pseudoephedrine-DM-GG (ROBITUSSIN CF PO) Take 5 mLs by mouth 4 times daily as needed        Selenium Sulfide 2.25 % SHAM Externally apply 1 Application topically twice a week       sodium chloride ([employer] SALINE NASAL SPRAY) 0.65 % nasal spray Spray 2 sprays into both nostrils 2 times daily       VITAMIN D, CHOLECALCIFEROL, PO Take 2,000 Units by mouth daily       [DISCONTINUED] levalbuterol (XOPENEX) 0.63 MG/3ML neb solution Take 3 mLs (0.63 mg) by nebulization every 8 hours as needed for shortness of breath / dyspnea or wheezing And bid for two weeks. 360 mL      [DISCONTINUED] metFORMIN (GLUCOPHAGE) 500 MG tablet Take 1 tablet (500 mg) by mouth 2 times daily (with meals) 180 tablet 1     Medications reviewed:  Medications reconciled to facility chart and changes were made to reflect current medications as identified as above med list. Below are the changes that were made:   Medications stopped since last EPIC medication reconciliation:   There are no discontinued medications.    Medications started since last [facility] medication reconciliation:  No orders of the defined types were placed in this encounter.    Patient Active Problem List   Diagnosis     GERD (gastroesophageal reflux disease)     Systolic murmur     CARDIOVASCULAR SCREENING; LDL GOAL LESS THAN 100     JAMES on CPAP     Osteoporosis     Peripheral  "neuropathy     Anxiety     Vitamin D deficiency     Mixed incontinence urge and stress     Health Care Home     Frequent UTI, Dr Bigg Muhammad, Urologist     Candidiasis of skin     Dependent edema     Benzodiazepine dependence, pt reports daily use of valium since the 1930's.     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     Chronic low back pain with bilateral sciatica     Macular degeneration (senile) of retina     Recurrent major depressive disorder, in partial remission (H)     Seborrheic dermatitis     Morbid obesity (H)     BMI 35.0-35.9,adult     Leg skin lesion, left     Chronic atrial fibrillation (H)     Reactive airway disease without complication     Mild cognitive impairment with memory loss       REVIEW OF SYSTEMS:  Negative selected, CONSTITUTIONAL: weight loss, w, poor appetite and fevers, Positive for 30 lbs weight gain in past year. EYES: tearing, redness and Positive for eyeglasses and macular degeneration.  Followed by Dr. Carlson ENT: [place], dysphagia and positive for h/o epistaxis,  CV: chest pain and Positive for dependent edema and atrial fibrillation.., RESPIRATORY: , SEe HPI for acute condition., : Positive for frequent UTI's and dribble incontinence. Has implanted stimulator for bladder control.... GI: N&V.  Positive for occasional loose stools and constipation.  H/o occ rectal bleeding.. NEURO: headaches and Positive for variable memory issues - son reports it is dependent on how much valium and oxycodone she has taken & pt agrees and MUSCULOSKELETAL: Positive for chronic back pain and right hand pain .       Physical Exam:  /83  Pulse 126  Temp 97.1  F (36.2  C)  Resp 18  Ht 5' 6\" (1.676 m)  Wt 242 lb (109.8 kg)  SpO2 (!) 83%  BMI 39.06 kg/m2  GENERAL APPEARANCE: Alert, overweight female seen.  Sitting up in electric w/c. Skin warm and moist.  Pale facial color. " "  Able to  express self verbally. Reports that she feels \"faint like\".   HEAD:  Normocephalic.  No facial asymmetry..  ENT: Mouth and posterior oropharynx normal, moist mucous membranes, .  EYES: EOM normal, conjunctiva and lids normal.   RESP:  Diminished lung sounds bilateral lower lobes, with scattered upper expiratory wheezes.  Old surgical scar from thyroidectomy visible at base of neck and upper chest.. Oxygen applied at 2l/min and O2 sats improved to 93% after 2-3 minutes.  CV: Irregularly, irregular rate and rhythm, Rapid ventricular rate. 2/6 systolic murmur. +2 ankle and pedal edema.  DP pulses +1 bilateral and MAHAMED stocking put back on.  No pedal wounds._Metoprolol 25 mg given stat and ventricular rate improved to 92 after 5-8 min.  ABDOMEN: Large, rounded abdomen. BS's positive all 4 quadrants. No discomfort with palpation of abdomen including suprapubic area.  MUSC:  NO pain with palpation of spinal process or with ROM of lower extremities and hips.   NEURO:  Alert and oriented X3, but admits to \"total lapses of some memory\" yesterday and today..  Purposeful movement of extremities without focal weakness, although legs are weaker than arms due to deconditioning. No tremors or cogwheeling.  Strong hand grasp bilaterally.  PSYCH:  Advanced directives discussed.  Pt confirms that comfort is focus.  DNR/DNI and no hospitalization.    Recent Labs:   CBC RESULTS:   Recent Labs   Lab Test 04/26/18 02/05/18  10/19/17   09/21/13   1455  06/01/11   1845   WBC  6.4   --    --   8.8   < >  10.9   --    RBC  4.33   --    --   4.35   < >  4.77   --    HGB  13.0  14.5   < >  13.3   < >  14.9  15.0   HCT  42.3   --    --   42.6   < >  43.1  49.2   MCV  97.7   --    --   97.9   < >  90  94.1   MCH   --    --    --    --    --   31.2  28.7   MCHC   --    --    --    --    --   34.6  30.5*   RDW  13.5   --    --   13.1   < >  12.7  13.7   PLT  188   --    --   201   < >  128*   --     < > = values in this interval not " displayed.       Last Basic Metabolic Panel:  Recent Labs   Lab Test 04/26/18 02/05/18   NA  138  139   POTASSIUM  4.3  4.3   CHLORIDE  97*  98   KURTIS  10.1  10.1   CO2  29  31   BUN  23  20   CR  0.83  0.76   GLC  297*  143*     GFR Estimate   Date Value Ref Range Status   04/26/2018 >60 >60 mL/min/1.73m2 Final   02/05/2018 >60 >60 mL/min/1.73m2 Final   11/21/2017 >60 >60 ml/min/1.73m2 Final   11/09/2017 59 (L) >60 ml/min/1.73m2 Final   10/26/2017 >60 >60 mL/min/1.73m2 Final       TSH   Date Value Ref Range Status   02/05/2018 2.94 0.30 - 4.50 uIU/mL Final   02/02/2017 0.60 0.20 - 4.50 mcU/mL Final   02/02/2017 0.60 0.20 - 4.50 ulU/ml Final       Lab Results   Component Value Date    A1C 6.9 02/05/2018    A1C 6.0 08/21/2017     Chest XRay:  4/30/18  IMPRESSION:  No acute findings.  No significant changes.  Poor inspiratory effort with chronic basilar markings.     Family Communication:  Son, Raudel updated and discussed status of his mothers condition in her presence.  He expressed understanding of her acute illness and consent for POC.  Also recognizes that she does not want hospitalization.    Assessment/Plan:  (J20.9) Acute bronchitis with symptoms > 10 days  (primary encounter diagnosis)  Comment: Ongoing now with hypoxia  Plan: STAT oxygen at 2-4 l/min via NC.  Continue xopenex nebs.  Start doxycycline 100 mg bid for 7 days.  Monitor carefully.  If she declines further, she desires comfort cares. Reevaluate in 24 hours.    (R09.02) Hypoxia  Comment: Acute secondary to respiratory illness  Plan: Same as number one.    (I48.91) Atrial fibrillation with RVR (H)  Comment: Likely due to exacerbation of respiratory illness  Plan: Increase metoprolol to 25 mg bid.  Reevaluate in 24 hours.    (E11.9) Type 2 diabetes mellitus without complication, without long-term current use of insulin (H)  Comment: Chronic with increase of accuchecks due to respiratory illness.  Plan: Continue metformin but start sliding scale for  blood sugars >200 at meals. Revaluate in 24 hours.  BMP pending following increase of metformin.    (I50.9) CHF NYHA class III, unspecified failure chronicity, unspecified type (H)  Comment: CHronic, but unclear role in today's illness.   Plan: Continue current dose of lasix and KCL supplement.  BNP on 5/7.    (G47.33,  Z99.89) JAMES on CPAP  Comment: Chronic, pt has returned CPAP as she refuses to use it  Plan: May require oxygen at night.  Monitor.    (G31.84) Mild cognitive impairment with memory loss  Comment: Chronic with recent episodes of increased confusion likely related to hypoxia  Plan:  Monitor.     Electronically signed by  SABINE Borjas CNP

## 2018-05-03 NOTE — PROGRESS NOTES
[facility]    Chief Complaint   Patient presents with     RECHECK     [facility] Medical Record Number:  [id number]    HPI:    Mireille Taylor is a 88 year old  (8/2/1929), who is being seen today for an episodic care visit at [facility].  HPI information obtained from: facility chart records, facility staff, patient report and [facility] chart review.Today's concern is:  Acute bronchitis with symptoms > 10 days  Pt started on doxycycline yesterday due to worsening respiratory illness resulting in diaphoresis and hypoxia.  This occurred last September to October and despite similar careful monitoring she decompensated until antibiotic was started and then she recovered. Remains on xopenex nebs.  Pulmicort nebs ordered, but pt's insurance will not cover. Afebrile this morning and pt slept with last night with oxygen. Pt does not desire hospitalization for any illness and son supports her decision.    Hypoxia  Oxygen started yesterday and had maintained sats >90% until this morning.  Oxygen was removed for morning cares and O2 sats decreased to 81% on room air.  Oxygen applied via NC at 2l/min and sats improved to 94%.    Chronic atrial fibrillation (H)  Pt had RVR yesterday likely due to her respiratory illness and hypoxia.  Metoprolol increased and since then her ventricular rate has been <100/min.  On ASA for anticoagulation due to bleeding risk.    JAMES (obstructive sleep apnea)  Pt has returned her CPAP one year ago due to refusal to wear it.  Over past year, she has awoken overnight with c/o her arthritic pain.  Now with her new hypoxia, this examiner questions whether nocturnal hypoxia has been an issue.  Used oxygen last night and slept well.    Mild cognitive impairment with memory loss  Ongoing mild cognitive impairment with occasional increase of confusion, likely related to illness.    Diabetes, type 2  Increased blood sugars with current illness despite  increase of metformin dose on 4/30.  Sliding scale insulin started yesterday.  Accuchecks in past 24 hours: 0730: 200 (today) and 1700: 223 (yesterday).  1700 was first dose of insulin.    ALLERGIES: Bactrim [sulfamethoxazole w-trimethoprim]; Gabapentin; Lyrica; Methadone; Nsaids; Penicillin g; Tramadol; Cephalexin hcl; Clindamycin hcl; and Macrobid [nitrofurantoin]  Past Medical, Surgical, Family and Social History reviewed and updated in [facility].    Current Outpatient Prescriptions   Medication Sig Dispense Refill     Acetaminophen (TYLENOL PO) Take 650 mg by mouth 4 times daily       Ascorbic Acid 1000 MG TABS Take 1,000 mg by mouth daily       aspirin 81 MG EC tablet Take 1 tablet (81 mg) by mouth daily 90 tablet 3     chlorhexidine (PERIDEX) 0.12 % solution Swish and spit in mouth 2 times daily Rinse with 1/2 oz BID for 30 secs and expectorate       citalopram (CELEXA) 40 MG tablet TAKE 1 TABLET BY MOUTH DAILY 90 tablet 1     Cranberry (CRANBERRY CONCENTRATE) 500 MG CAPS Take 1 capsule (500 mg) by mouth 2 times daily 90 capsule      diazepam (VALIUM) 5 MG tablet 2.5 mg at HS 60 tablet      doxycycline monohydrate 100 MG capsule Take 1 capsule (100 mg) by mouth 2 times daily for 7 days 14 capsule 0     estradiol (ESTRACE VAGINAL) 0.1 MG/GM cream Place 2 g vaginally three times a week 42.5 g      furosemide (LASIX) 20 MG tablet Take 2 tablets (40 mg) by mouth 2 times daily 90 tablet 4     guaiFENesin (MUCINEX) 600 MG 12 hr tablet Take 600 mg by mouth every 12 hours       insulin aspart (NOVOLOG FLEXPEN) 100 UNIT/ML injection Sliding scale with meals:  200-249  - 2U, 250-199: 4U, 300-349: 6U, 350-400: 8U, >400: 10U. 15 mL 1     levalbuterol (XOPENEX) 0.63 MG/3ML neb solution Take 3 mLs (0.63 mg) by nebulization every 8 hours as needed for shortness of breath / dyspnea or wheezing And bid for two weeks. 360 mL      levalbuterol (XOPENEX) 0.63 MG/3ML neb solution Take 1 ampule by nebulization 2 times daily        levothyroxine (SYNTHROID, LEVOTHROID) 125 MCG tablet Take 1 tablet (125 mcg) by mouth daily 90 tablet 1     metFORMIN (GLUCOPHAGE) 500 MG tablet Take 1.5 tablets (750 mg) by mouth 2 times daily (with meals) 180 tablet 1     methenamine hippurate (HIPREX) 1 G TABS TAKE 1 TABLET BY MOUTH ONCE A DAY.  TAKE ALONG WITH 1000MG VITAMIN-C 180 tablet 4     metoprolol tartrate (LOPRESSOR) 25 MG tablet Take 1 tablet (25 mg) by mouth 2 times daily Hold for systolic BP <100 or pulse <60. 90 tablet 3     mirabegron (MYRBETRIQ) 50 MG 24 hr tablet Take 1 tablet (50 mg) by mouth daily 30 tablet 11     mirtazapine (REMERON) 15 MG tablet Take 1 tablet (15 mg) by mouth At Bedtime 30 tablet 1     Multiple Vitamins-Minerals (PRESERVISION AREDS 2 PO) Take 1 tablet by mouth 2 times daily       OxyCODONE HCl (ROXICODONE PO) Take 5 mg by mouth 2 times daily Give at 0630 and 2100. And q6h prn       pantoprazole (PROTONIX) 40 MG enteric coated tablet Take 1 tablet (40 mg) by mouth daily Take 30-60 minutes before a meal. 180 tablet 3     polyethylene glycol (MIRALAX/GLYCOLAX) powder Take 17 g by mouth daily as needed for constipation 119 g      potassium chloride SA (K-DUR/KLOR-CON M) 10 MEQ CR tablet Take 2 tablets (20 mEq) by mouth daily 90 tablet 1     Pseudoephedrine-DM-GG (ROBITUSSIN CF PO) Take 5 mLs by mouth 4 times daily as needed        Selenium Sulfide 2.25 % SHAM Externally apply 1 Application topically twice a week       sodium chloride ([employer] SALINE NASAL SPRAY) 0.65 % nasal spray Spray 2 sprays into both nostrils 2 times daily       VITAMIN D, CHOLECALCIFEROL, PO Take 2,000 Units by mouth daily       Medications reviewed:  Medications reconciled to facility chart and changes were made to reflect current medications as identified as above med list. Below are the changes that were made:   Medications stopped since last EPIC medication reconciliation:   There are no discontinued medications.    Medications started since last EPIC  medication reconciliation:  No orders of the defined types were placed in this encounter.    Patient Active Problem List   Diagnosis     GERD (gastroesophageal reflux disease)     Systolic murmur     CARDIOVASCULAR SCREENING; LDL GOAL LESS THAN 100     Osteoporosis     Peripheral neuropathy     Anxiety     Vitamin D deficiency     Mixed incontinence urge and stress     Health Care Home     Frequent UTI, Dr Bigg Muhammad, Urologist     Candidiasis of skin     Dependent edema     Benzodiazepine dependence, pt reports daily use of valium since the 1930's.     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     Chronic low back pain with bilateral sciatica     Macular degeneration (senile) of retina     Recurrent major depressive disorder, in partial remission (H)     Seborrheic dermatitis     Morbid obesity (H)     BMI 35.0-35.9,adult     Leg skin lesion, left     Chronic atrial fibrillation (H)     Reactive airway disease without complication     Mild cognitive impairment with memory loss     JAMES (obstructive sleep apnea)     REVIEW OF SYSTEMS:  Negative selected, CONSTITUTIONAL: weight loss, w, poor appetite and fevers, Positive for 30 lbs weight gain in past year. EYES: tearing, redness and Positive for eyeglasses and macular degeneration.  Followed by Dr. Carlson ENT: [place], dysphagia and positive for h/o epistaxis,  CV: chest pain and Positive for dependent edema and atrial fibrillation.., RESPIRATORY: , SEe HPI for acute condition., : Positive for frequent UTI's and dribble incontinence. Has implanted stimulator for bladder control.... GI: N&V.  Positive for occasional loose stools and constipation.  H/o occ rectal bleeding.. NEURO: headaches and Positive for variable memory issues - son reports it is dependent on how much valium and oxycodone she has taken & pt agrees and MUSCULOSKELETAL: Positive for  "chronic back pain and right hand pain .    Physical Exam:  /76  Pulse 84  Temp 96.8  F (36  C)  Resp 20  Ht 5' 6\" (1.676 m)  Wt 238 lb (108 kg)  SpO2 (!) 81%  BMI 38.41 kg/m2  GENERAL APPEARANCE: Alert, overweight female seen.  Sitting up in electric w/c. Skin warm and dry.  Pale facial color.   Able to  express self verbally. Reports that she still feels \"ill\". Doing morning cares on her own in the BR.  Oxygen is not in place.  HEAD:  Normocephalic.  No facial asymmetry..  ENT: Mouth and posterior oropharynx normal, moist mucous membranes, .  EYES: EOM normal, conjunctiva and lids normal.   RESP:  Diminished lung sounds bilateral lower lobes.  No wheezing or rhonchi.  Old surgical scar from thyroidectomy visible at base of neck and upper chest.. Oxygen applied at 2l/min due to low O2 sats and O2 sats improved to 94 % after 2-3 minutes.  CV: Irregularly, irregular rate and rhythm, Rate is controlled.  2/6 systolic murmur. +2 ankle and pedal edema.  DP pulses +1 bilateral and MAHAMED stocking put back on.  No pedal wounds._  ABDOMEN: Large, rounded abdomen. BS's positive all 4 quadrants. No discomfort with palpation of abdomen including suprapubic area.  MUSC:  NO pain with palpation of spinal process or with ROM of lower extremities and hips.   NEURO:  Alert and oriented X3, but admits to memory issues and did not recall when current illness started.  Purposeful movement of extremities without focal weakness, although legs are weaker than arms due to deconditioning. No tremors or cogwheeling.  Strong hand grasp bilaterally.      Recent Labs:   CBC RESULTS:   Recent Labs   Lab Test 05/03/18 04/26/18  10/19/17   09/21/13   1455  06/01/11   1845   WBC   --   6.4   --   8.8   < >  10.9   --    RBC   --   4.33   --   4.35   < >  4.77   --    HGB  13.3  13.0   < >  13.3   < >  14.9  15.0   HCT   --   42.3   --   42.6   < >  43.1  49.2   MCV   --   97.7   --   97.9   < >  90  94.1   MCH   --    --    --    --    " --   31.2  28.7   MCHC   --    --    --    --    --   34.6  30.5*   RDW   --   13.5   --   13.1   < >  12.7  13.7   PLT   --   188   --   201   < >  128*   --     < > = values in this interval not displayed.       Last Basic Metabolic Panel:  Recent Labs   Lab Test 05/03/18 04/26/18   NA  138  138   POTASSIUM  4.4  4.3   CHLORIDE  98  97*   KURTIS  10.0  10.1   CO2  31  29   BUN  25  23   CR  0.83  0.83   GLC  223*  297*     GFR Estimate   Date Value Ref Range Status   05/03/2018 >60 >60 mL/min/1.73m2 Final   04/26/2018 >60 >60 mL/min/1.73m2 Final   02/05/2018 >60 >60 mL/min/1.73m2 Final   11/21/2017 >60 >60 ml/min/1.73m2 Final   11/09/2017 59 (L) >60 ml/min/1.73m2 Final       TSH   Date Value Ref Range Status   02/05/2018 2.94 0.30 - 4.50 uIU/mL Final   02/02/2017 0.60 0.20 - 4.50 mcU/mL Final   02/02/2017 0.60 0.20 - 4.50 ulU/ml Final       Lab Results   Component Value Date    A1C 6.9 02/05/2018    A1C 6.0 08/21/2017     Assessment/Plan:  (J20.9) Acute bronchitis with symptoms > 10 days  (primary encounter diagnosis)  Comment: Ongoing  Plan: Because pulmicort is not covered, will start oral prednisone and give 40 mg today and then start a taper over the next 9 days.  Continue antibiotic, oxygen, xopenex nebs.  CBC and BMP on Monday.  Continue to monitor VS's.  Pt does not desire hospitalization if she should decline.    (R09.02) Hypoxia  Comment: Ongoing  Plan: Continue oxygen therapy.  MOnitor carefully.    (I48.2) Chronic atrial fibrillation (H)  Comment: Ventricular rate improved today.  Plan: Continue metoprolol for rate control and ASA.  Continue to monitor. Using xopenex nebs as in the past duonebs caused tachycardia.    (G47.33) JAMES (obstructive sleep apnea)  Comment: Chronic, pt refuses CPAP  Plan: As she recovers, consider night time use of oxygen if nocturnal hypoxia is present due to her JAMES.    (G31.84) Mild cognitive impairment with memory loss  Comment: Ongoing with episodes of increased confusion,  likely due to her hypoxia  Plan: Continue to monitor.  Requires 24 hour skilled nsg care.    (E11.9) Type 2 diabetes mellitus without complication, without long-term current use of insulin (H)  Comment: CHronic with recent increase of blood sugars  Plan:  Continue metformin, monitoring of accuchecks and sliding.scale   With prescribed prednisone for lung illness, can expect an increase of her blood sugars.    Electronically signed by  SABINE Borjas CNP

## 2018-05-04 PROBLEM — G47.33 OSA (OBSTRUCTIVE SLEEP APNEA): Status: ACTIVE | Noted: 2018-01-01

## 2018-05-07 PROBLEM — I50.9: Status: ACTIVE | Noted: 2018-01-01

## 2018-05-07 NOTE — PROGRESS NOTES
"[facility]    Chief Complaint   Patient presents with     ARNOLD     [place] Medical Record Number:  [id number]    HPI:    Mireille Taylor is a 88 year old  (8/2/1929), who is being seen today for an episodic care visit at [facility].  HPI information obtained from: facility chart records, facility staff, patient report and [facility] chart review.Today's concern is:  Acute bronchitis with symptoms > 10 days  Pt continues on antibiotic, xopenex nebs and prednisone.  Cough is decreased, but continues to require oxygen.  No fever.  CBC and BMP pending today.  Pt reports ongoing malaise, but with poor memory of the care interventions that have been initiated.  Pt reports increased \"shakiness\" and \"weakness\" when she stands up and nsg have been using assist of two.    Hypoxia  Continues to require oxygen at 2l/min.     Chronic atrial fibrillation (H)  Since metoprolol increase due to RVR on 5/3, her heart rate ranges have been . BP ranges 101-130/65-80.    Type 2 diabetes mellitus without complication, without long-term current use of insulin (H)  Accuchecs in past three days: 0730: 200-249, 11am: 175-241, 5pm: 148-265.  Receiving sliding scale for blood sugars >200.  Remains on metformin.    Recurrent major depressive disorder, in partial remission (H)  Remains on celexa and remeron.  Remeron dose increased on 4/24 with hope to both improve mood and increase sleep at night vs her waking up to take oxycodone.  Unfortunately, she is continuing to request oxycodone during the night and feels that the remeron may be contributing to her weakness now with transfers..      Morbid obesity (H)  No recent weight changes.    Mild cognitive impairment with memory loss  Continues to have poor recall of recent events.    CHF  Remains on lasix bid. Weight stable, but continues with LE edema.    ALLERGIES: Bactrim [sulfamethoxazole w-trimethoprim]; Gabapentin; Lyrica; Methadone; " Nsaids; Penicillin g; Tramadol; Cephalexin hcl; Clindamycin hcl; and Macrobid [nitrofurantoin]  Past Medical, Surgical, Family and Social History reviewed and updated in EPIC.    Current Outpatient Prescriptions   Medication Sig Dispense Refill     Acetaminophen (TYLENOL PO) Take 650 mg by mouth 4 times daily       Ascorbic Acid 1000 MG TABS Take 1,000 mg by mouth daily       aspirin 81 MG EC tablet Take 1 tablet (81 mg) by mouth daily 90 tablet 3     chlorhexidine (PERIDEX) 0.12 % solution Swish and spit in mouth 2 times daily Rinse with 1/2 oz BID for 30 secs and expectorate       citalopram (CELEXA) 40 MG tablet TAKE 1 TABLET BY MOUTH DAILY 90 tablet 1     Cranberry (CRANBERRY CONCENTRATE) 500 MG CAPS Take 1 capsule (500 mg) by mouth 2 times daily 90 capsule      diazepam (VALIUM) 5 MG tablet 2.5 mg at HS 60 tablet      doxycycline monohydrate 100 MG capsule Take 1 capsule (100 mg) by mouth 2 times daily for 7 days 14 capsule 0     estradiol (ESTRACE VAGINAL) 0.1 MG/GM cream Place 2 g vaginally three times a week 42.5 g      furosemide (LASIX) 20 MG tablet Take 2 tablets (40 mg) by mouth 2 times daily 90 tablet 4     guaiFENesin (MUCINEX) 600 MG 12 hr tablet Take 600 mg by mouth every 12 hours       INSULIN ASPART SC Inject Subcutaneous 3 times daily (with meals) Inject per sliding scale:  200-249 = 2U, 250-299 = 4U, 300-349 = 6U, 350-400 = 8U, 401+ =  10U.       levalbuterol (XOPENEX) 0.63 MG/3ML neb solution Take 3 mLs (0.63 mg) by nebulization every 8 hours as needed for shortness of breath / dyspnea or wheezing And bid for two weeks. 360 mL      levalbuterol (XOPENEX) 0.63 MG/3ML neb solution Take 1 ampule by nebulization 2 times daily       levothyroxine (SYNTHROID, LEVOTHROID) 125 MCG tablet Take 1 tablet (125 mcg) by mouth daily 90 tablet 1     metFORMIN (GLUCOPHAGE) 500 MG tablet Take 1.5 tablets (750 mg) by mouth 2 times daily (with meals) 180 tablet 1     methenamine hippurate (HIPREX) 1 G TABS TAKE 1  TABLET BY MOUTH ONCE A DAY.  TAKE ALONG WITH 1000MG VITAMIN-C 180 tablet 4     metoprolol tartrate (LOPRESSOR) 25 MG tablet Take 1 tablet (25 mg) by mouth 2 times daily Hold for systolic BP <100 or pulse <60. 90 tablet 3     mirabegron (MYRBETRIQ) 50 MG 24 hr tablet Take 1 tablet (50 mg) by mouth daily 30 tablet 11     mirtazapine (REMERON) 15 MG tablet Take 1 tablet (15 mg) by mouth At Bedtime 30 tablet 1     Multiple Vitamins-Minerals (PRESERVISION AREDS 2 PO) Take 1 tablet by mouth 2 times daily       OxyCODONE HCl (ROXICODONE PO) Take 5 mg by mouth 2 times daily Give at 0630 and 2100. And q6h prn       pantoprazole (PROTONIX) 40 MG enteric coated tablet Take 1 tablet (40 mg) by mouth daily Take 30-60 minutes before a meal. 180 tablet 3     polyethylene glycol (MIRALAX/GLYCOLAX) powder Take 17 g by mouth daily as needed for constipation 119 g      potassium chloride SA (K-DUR/KLOR-CON M) 10 MEQ CR tablet Take 2 tablets (20 mEq) by mouth daily 90 tablet 1     predniSONE (DELTASONE) 10 MG tablet 40 mg today, then 30 mg qd X3, then 20 mg qd X 3 then 10 mg qdX3 and then dc.       Pseudoephedrine-DM-GG (ROBITUSSIN CF PO) Take 5 mLs by mouth 4 times daily as needed        Selenium Sulfide 2.25 % SHAM Externally apply 1 Application topically twice a week       sodium chloride ([employer] SALINE NASAL SPRAY) 0.65 % nasal spray Spray 2 sprays into both nostrils 2 times daily       VITAMIN D, CHOLECALCIFEROL, PO Take 2,000 Units by mouth daily       Medications reviewed:  Medications reconciled to facility chart and changes were made to reflect current medications as identified as above med list. Below are the changes that were made:   Medications stopped since last EPIC medication reconciliation:   There are no discontinued medications.    Medications started since last [facility] medication reconciliation:  No orders of the defined types were placed in this encounter.    Patient Active Problem List   Diagnosis     GERD  (gastroesophageal reflux disease)     Systolic murmur     CARDIOVASCULAR SCREENING; LDL GOAL LESS THAN 100     Osteoporosis     Peripheral neuropathy     Anxiety     Vitamin D deficiency     Mixed incontinence urge and stress     Health Care Home     Frequent UTI, Dr Bigg Muhammad, Urologist     Candidiasis of skin     Dependent edema     Benzodiazepine dependence, pt reports daily use of valium since the 1930's.     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     Chronic low back pain with bilateral sciatica     Macular degeneration (senile) of retina     Recurrent major depressive disorder, in partial remission (H)     Seborrheic dermatitis     Morbid obesity (H)     BMI 35.0-35.9,adult     Leg skin lesion, left     Chronic atrial fibrillation (H)     Reactive airway disease without complication     Mild cognitive impairment with memory loss     JAMES (obstructive sleep apnea)     CHF (congestive heart failure), NYHA class III, unspecified failure chronicity, unspecified type (H)     REVIEW OF SYSTEMS:  Negative selected, CONSTITUTIONAL: weight loss, w, poor appetite and fevers, Positive for 30 lbs weight gain in past year. EYES: tearing, redness and Positive for eyeglasses and macular degeneration.  Followed by Dr. Carlson ENT: [place], dysphagia and positive for h/o epistaxis,  CV: chest pain and Positive for dependent edema and atrial fibrillation.., RESPIRATORY: , SEe HPI for acute condition., : Positive for frequent UTI's and dribble incontinence. Has implanted stimulator for bladder control.... GI: N&V.  Positive for occasional loose stools and constipation.  H/o occ rectal bleeding.. NEURO: headaches and Positive for variable memory issues - son reports it is dependent on how much valium and oxycodone she has taken & pt agrees and MUSCULOSKELETAL: Positive for chronic back pain and right hand  "pain .    Physical Exam:  /74  Pulse 78  Temp 96.5  F (35.8  C)  Resp 18  Ht 5' 6\" (1.676 m)  Wt 238 lb (108 kg)  SpO2 97%  BMI 38.41 kg/m2  GENERAL APPEARANCE: Alert, overweight female seen.  Sitting up in electric w/c. Skin warm and dry.  Pink facial color.   Able to  express self verbally.  Doing morning cares on her own in the [place].  Oxygen is not in place.  HEAD:  Normocephalic.  No facial asymmetry..  ENT: Mouth and posterior oropharynx normal, moist mucous membranes, .  EYES: EOM normal, conjunctiva and lids normal.   RESP:  Diminished lung sounds bilateral lower lobes.  No wheezing or rhonchi.  Old surgical scar from thyroidectomy visible at base of neck and upper chest.. Oxygen on at 2 l/min via NC>  No cough during visit..  CV: Irregularly, irregular rate and rhythm, Rate is controlled.  2/6 systolic murmur. +2 ankle and pedal edema.  DP pulses +1 bilateral and MAHAMED stocking put back on.  No pedal wounds._  ABDOMEN: Large, rounded abdomen. BS's positive all 4 quadrants. No discomfort with palpation of abdomen including suprapubic area.  MUSC:  NO pain with palpation of spinal process or with ROM of lower extremities and hips.   NEURO:  Alert and oriented X3, but admits to memory issues and did not recall when current illness started.  Purposeful movement of extremities without focal weakness, although legs are weaker than arms due to deconditioning. No tremors or cogwheeling.  Strong hand grasp bilaterally.  PSYCH:  Concerned about her health and anxious about what is being done.  Discussed POC and need to have Physical Therapy involved with her transfers given her concerns about weakness.  Initially resistive to this idea but then agreed to try it.        Recent Labs:   CBC RESULTS:   Recent Labs   Lab Test 05/03/18 04/26/18  10/19/17   09/21/13   1455  06/01/11   1845   WBC   --   6.4   --   8.8   < >  10.9   --    RBC   --   4.33   --   4.35   < >  4.77   --    HGB  13.3  13.0   < >  13.3   < " >  14.9  15.0   HCT   --   42.3   --   42.6   < >  43.1  49.2   MCV   --   97.7   --   97.9   < >  90  94.1   MCH   --    --    --    --    --   31.2  28.7   MCHC   --    --    --    --    --   34.6  30.5*   RDW   --   13.5   --   13.1   < >  12.7  13.7   PLT   --   188   --   201   < >  128*   --     < > = values in this interval not displayed.       Last Basic Metabolic Panel:  Recent Labs   Lab Test 05/03/18 04/26/18   NA  138  138   POTASSIUM  4.4  4.3   CHLORIDE  98  97*   KURTIS  10.0  10.1   CO2  31  29   BUN  25  23   CR  0.83  0.83   GLC  223*  297*     GFR Estimate   Date Value Ref Range Status   05/03/2018 >60 >60 mL/min/1.73m2 Final   04/26/2018 >60 >60 mL/min/1.73m2 Final   02/05/2018 >60 >60 mL/min/1.73m2 Final   11/21/2017 >60 >60 ml/min/1.73m2 Final   11/09/2017 59 (L) >60 ml/min/1.73m2 Final       TSH   Date Value Ref Range Status   02/05/2018 2.94 0.30 - 4.50 uIU/mL Final   02/02/2017 0.60 0.20 - 4.50 mcU/mL Final   02/02/2017 0.60 0.20 - 4.50 ulU/ml Final       Lab Results   Component Value Date    A1C 6.9 02/05/2018    A1C 6.0 08/21/2017 4/30/18: Chest XRay:  No acute findings.  No significant changes compared to 10/19/17.     Assessment/Plan:  (J20.9) Acute bronchitis with symptoms > 10 days  (primary encounter diagnosis)  Comment: Some improvement noted  Plan: Complete course of antibiotic and continue with xopenex nebs and prednisone taper.  Monitor VS's. CBC pending today.  Physical Therapy eval and tx for transfers.    (R09.02) Hypoxia  Comment: Improved  Plan: Continue oxygen. OK for attempts to taper during the day. Continue with nighttime oxygen.    (I48.2) Chronic atrial fibrillation (H)  Comment: Ventricular rate improved on increased dose of metoprolol, but possible orthostatic hypotension  Plan: Orthostatic BP's to be checked qd X3.  Continue metoprolol dose and ASA at this time.    (E11.9) Type 2 diabetes mellitus without complication, without long-term current use of insulin  (H)  Comment: Chronic with increased blood sugars due to acute infection and now prednisone  Plan: Continue sliding scale.  Following resolution of acute illness and discontinuation of prednisone, if blood sugars remain elevated, may need to consider scheduling lantus insulin and decreasing metformin.    (F33.41) Recurrent major depressive disorder, in partial remission (H)  Comment: Ongoing, no improvement on Remeron increase  Plan: Decrease Remeron to 7.5 mg daily and monitor for effect    (E66.01) Morbid obesity (H)  Comment: Chronic  Plan: Continue to encourage health food choices.  Pt is unable to exercise due to pain issues and now respiratory issues.    (G31.84) Mild cognitive impairment with memory loss  Comment: Ongoing  Plan: Monitor for expected decline.    (I50.9) CHF (congestive heart failure), NYHA class III, unspecified failure chronicity, unspecified type (H)  Comment: Ongoing  Plan: Continue lasix and supplemental KCl. BMP and BNP pending today.    Electronically signed by  SABINE Borjas CNP

## 2018-05-14 NOTE — PROGRESS NOTES
"[facility]    Chief Complaint   Patient presents with     ARNOLD     [facility] Medical Record Number:  [id number]    HPI:    Mireille Taylor is a 88 year old  (8/2/1929), who is being seen today for an episodic care visit at [facility].  HPI information obtained from: facility chart records, facility staff, patient report and [facility] chart review.Today's concern is:  Acute bronchitis with symptoms greater than 10 days  Pt has completed course of antibiotic and steroids.  Today is her last day with scheduled xopenex nebs and mucinex.  Cough is resolved.  Remains on oxygen via NC.  She has become depressed and has been refusing to get up from bed some of the time.  Has known sleep apnea and has refused to use the CPAP machine and actually sent it back to the [employer].  Afebrile.    Chronic atrial fibrillation (H)  Metoprolol dose increased on 5/3 due to RVR atrial fibrillation.  Rate in past three days: 74-94. On ASA for anticoagulation. Pt opted for no coumadin.     CHF (congestive heart failure), NYHA class III, unspecified failure chronicity, unspecified type (H)  Remains on lasix.  Has had steady slow increase of weight in past year.  Is non ambulatory and very sedentary contributing to weight gain. Requiring oxygen as noted above.    JAMES (obstructive sleep apnea)  Long standing, but pt refusing CPAP.  Has been using oxygen at night.    Recurrent major depressive disorder, in partial remission (H)  Remains on celexa.  Attempted trial of Remeron.  No improvement.  Pt continues to report night time sleep disturbances and today states that she is depressed due to her deterioration of health \"since I got here\".  Has an appt today with her outside psychologist.  Her son has reported that in the past, she has become very depressed with her illnesses. Does become accusatory to her sons about their lack of attention or caring to her, when in reality, they " are very much involved.  Slowly advancing memory loss does contribute to this, as she does not recall what has been done for her.     Type 2 diabetes mellitus without complication, without long-term current use of insulin (H)  Accuchecks in past three days: 0730: 126-137, 11am: 118-234, 5pm: 137-204.  Received sliding scale once.  Prednisone is completed.      Mild cognitive impairment with memory loss  BIMS score of 10 in February. Verbal comments have demonstrated issues with her short term memory.      ALLERGIES: Bactrim [sulfamethoxazole w-trimethoprim]; Gabapentin; Lyrica; Methadone; Nsaids; Penicillin g; Tramadol; Cephalexin hcl; Clindamycin hcl; and Macrobid [nitrofurantoin]  Past Medical, Surgical, Family and Social History reviewed and updated in [facility].    Current Outpatient Prescriptions   Medication Sig Dispense Refill     Acetaminophen (TYLENOL PO) Take 650 mg by mouth 4 times daily       Ascorbic Acid 1000 MG TABS Take 1,000 mg by mouth daily       aspirin 81 MG EC tablet Take 1 tablet (81 mg) by mouth daily 90 tablet 3     chlorhexidine (PERIDEX) 0.12 % solution Swish and spit in mouth 2 times daily Rinse with 1/2 oz BID for 30 secs and expectorate       citalopram (CELEXA) 40 MG tablet TAKE 1 TABLET BY MOUTH DAILY 90 tablet 1     Cranberry (CRANBERRY CONCENTRATE) 500 MG CAPS Take 1 capsule (500 mg) by mouth 2 times daily 90 capsule      diazepam (VALIUM) 5 MG tablet 2.5 mg at HS 60 tablet      estradiol (ESTRACE VAGINAL) 0.1 MG/GM cream Place 2 g vaginally three times a week 42.5 g      furosemide (LASIX) 20 MG tablet Take 2 tablets (40 mg) by mouth 2 times daily 90 tablet 4     guaiFENesin (MUCINEX) 600 MG 12 hr tablet Take 600 mg by mouth every 12 hours       INSULIN ASPART SC Inject Subcutaneous 3 times daily (with meals) Inject per sliding scale:  200-249 = 2U, 250-299 = 4U, 300-349 = 6U, 350-400 = 8U, 401+ =  10U.       levalbuterol (XOPENEX) 0.63 MG/3ML neb solution Take 3 mLs (0.63 mg) by  nebulization every 8 hours as needed for shortness of breath / dyspnea or wheezing And bid for two weeks. 360 mL      levalbuterol (XOPENEX) 0.63 MG/3ML neb solution Take 1 ampule by nebulization 2 times daily       levothyroxine (SYNTHROID, LEVOTHROID) 125 MCG tablet Take 1 tablet (125 mcg) by mouth daily 90 tablet 1     metFORMIN (GLUCOPHAGE) 500 MG tablet Take 1.5 tablets (750 mg) by mouth 2 times daily (with meals) 180 tablet 1     methenamine hippurate (HIPREX) 1 G TABS TAKE 1 TABLET BY MOUTH ONCE A DAY.  TAKE ALONG WITH 1000MG VITAMIN-C 180 tablet 4     metoprolol tartrate (LOPRESSOR) 25 MG tablet Take 1 tablet (25 mg) by mouth 2 times daily Hold for systolic BP <100 or pulse <60. 90 tablet 3     mirabegron (MYRBETRIQ) 50 MG 24 hr tablet Take 1 tablet (50 mg) by mouth daily 30 tablet 11     mirtazapine (REMERON) 15 MG tablet Take 0.5 tablets (7.5 mg) by mouth At Bedtime 30 tablet 1     Multiple Vitamins-Minerals (PRESERVISION AREDS 2 PO) Take 1 tablet by mouth 2 times daily       OxyCODONE HCl (ROXICODONE PO) Take 5 mg by mouth 2 times daily Give at 0630 and 2100. And q6h prn       pantoprazole (PROTONIX) 40 MG enteric coated tablet Take 1 tablet (40 mg) by mouth daily Take 30-60 minutes before a meal. 180 tablet 3     polyethylene glycol (MIRALAX/GLYCOLAX) powder Take 17 g by mouth daily as needed for constipation 119 g      potassium chloride SA (K-DUR/KLOR-CON M) 10 MEQ CR tablet Take 2 tablets (20 mEq) by mouth daily 90 tablet 1     Pseudoephedrine-DM-GG (ROBITUSSIN CF PO) Take 5 mLs by mouth 4 times daily as needed        Selenium Sulfide 2.25 % SHAM Externally apply 1 Application topically twice a week       sodium chloride ([employer] SALINE NASAL SPRAY) 0.65 % nasal spray Spray 2 sprays into both nostrils 2 times daily       VITAMIN D, CHOLECALCIFEROL, PO Take 2,000 Units by mouth daily       Medications reviewed:  Medications reconciled to facility chart and changes were made to reflect current medications  as identified as above med list. Below are the changes that were made:   Medications stopped since last EPIC medication reconciliation:   Medications Discontinued During This Encounter   Medication Reason     predniSONE (DELTASONE) 10 MG tablet Therapy completed       Medications started since last [facility] medication reconciliation:  No orders of the defined types were placed in this encounter.    Patient Active Problem List   Diagnosis     GERD (gastroesophageal reflux disease)     Systolic murmur     CARDIOVASCULAR SCREENING; LDL GOAL LESS THAN 100     Osteoporosis     Peripheral neuropathy     Anxiety     Vitamin D deficiency     Mixed incontinence urge and stress     Health Care Home     Frequent UTI, Dr Bigg Muhammad, Urologist     Candidiasis of skin     Dependent edema     Benzodiazepine dependence, pt reports daily use of valium since the 1930's.     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     Chronic low back pain with bilateral sciatica     Macular degeneration (senile) of retina     Recurrent major depressive disorder, in partial remission (H)     Seborrheic dermatitis     Morbid obesity (H)     BMI 35.0-35.9,adult     Leg skin lesion, left     Chronic atrial fibrillation (H)     Reactive airway disease without complication     Mild cognitive impairment with memory loss     JAMES (obstructive sleep apnea)     CHF (congestive heart failure), NYHA class III, unspecified failure chronicity, unspecified type (H)       REVIEW OF SYSTEMS:  Negative selected, CONSTITUTIONAL: weight loss, w, poor appetite and fevers, Positive for 30 lbs weight gain in past year. EYES: tearing, redness and Positive for eyeglasses and macular degeneration.  Followed by Dr. Carlson ENT: [place], dysphagia and positive for h/o epistaxis,  CV: chest pain and Positive for dependent edema and atrial fibrillation.., RESPIRATORY: ,  "SEe HPI for acute condition., : Positive for frequent UTI's and dribble incontinence. Has implanted stimulator for bladder control.... GI: N&V.  Positive for occasional loose stools and constipation.  H/o occ rectal bleeding.. NEURO: headaches and Positive for variable memory issues - son reports it is dependent on how much valium and oxycodone she has taken & pt agrees and MUSCULOSKELETAL: Positive for chronic back pain and right hand pain .    Physical Exam:  /69  Pulse 74  Temp 97.1  F (36.2  C)  Resp 20  Ht 5' 6\" (1.676 m)  Wt 240 lb (108.9 kg)  SpO2 93%  BMI 38.74 kg/m2  GENERAL APPEARANCE: Alert, overweight female seen.  Resting in bed, early morning.  Skin warm and dry.  Pink facial color.   Able to  express self verbally. Flat affect.  HEAD:  Normocephalic.  No facial asymmetry..  ENT: Mouth and posterior oropharynx normal, moist mucous membranes, .  EYES: EOM normal, conjunctiva and lids normal.   RESP:  Diminished lung sounds bilateral lower lobes.  No wheezing or rhonchi.  Old surgical scar from thyroidectomy visible at base of neck and upper chest.. Oxygen on at 2 l/min via NC>  No cough during visit..  CV: Irregularly, irregular rate and rhythm, Rate is controlled.  2/6 systolic murmur. +2 ankle and pedal edema.  DP pulses +1 bilateral  No pedal wounds._  ABDOMEN: Large, rounded abdomen. BS's positive all 4 quadrants. No discomfort with palpation of abdomen including suprapubic area.  MUSC:  NO pain with palpation of spinal process or with ROM of lower extremities and hips.   NEURO:  Alert and oriented X3.  Purposeful movement of extremities without focal weakness, although legs are weaker than arms due to deconditioning. No tremors or cogwheeling.  Strong hand grasp bilaterally.  PSYCH:  Concerned about her health and anxious about what is being done.  Discussed the improvement of her bronchitis with the current tx and the completion of certain treatments due to her improvement.  " "Encouraged her to get up and begin participating in activities as it would help her lungs as well.  Discussed trying to wean off oxygen, but she states \"that scares me\".  When asked to describe this, she stated she did not know why.  Very focused on her feelings of general decline and admitted to not wanting to get up during the day.  Did agree to get up today. Admits to using oxycodone at night to help with sleep.  Discussed removal of Remeron and to start Trazodone for sleep which she agreed with.    Recent Labs:   CBC RESULTS:   Recent Labs   Lab Test 05/07/18 05/03/18 04/26/18 09/21/13   1455  06/01/11   1845   WBC  5.9   --   6.4   < >  10.9   --    RBC  4.18   --   4.33   < >  4.77   --    HGB  12.2  13.3  13.0   < >  14.9  15.0   HCT  41.6   --   42.3   < >  43.1  49.2   MCV  99.5   --   97.7   < >  90  94.1   MCH   --    --    --    --   31.2  28.7   MCHC   --    --    --    --   34.6  30.5*   RDW  13.5   --   13.5   < >  12.7  13.7   PLT  200   --   188   < >  128*   --     < > = values in this interval not displayed.       Last Basic Metabolic Panel:  Recent Labs   Lab Test 05/07/18 05/03/18   NA  140  138   POTASSIUM  4.9  4.4   CHLORIDE  98  98   KURTIS  9.8  10.0   CO2  34*  31   BUN  26  25   CR  0.76  0.83   GLC  229*  223*     GFR Estimate   Date Value Ref Range Status   05/07/2018 >60 >60 ml/min/1.73m2 Final   05/03/2018 >60 >60 mL/min/1.73m2 Final   04/26/2018 >60 >60 mL/min/1.73m2 Final   02/05/2018 >60 >60 mL/min/1.73m2 Final   11/21/2017 >60 >60 ml/min/1.73m2 Final       TSH   Date Value Ref Range Status   02/05/2018 2.94 0.30 - 4.50 uIU/mL Final   02/02/2017 0.60 0.20 - 4.50 mcU/mL Final   02/02/2017 0.60 0.20 - 4.50 ulU/ml Final       Lab Results   Component Value Date    A1C 6.9 02/05/2018    A1C 6.0 08/21/2017     Assessment/Plan:  (J20.9) Acute bronchitis with symptoms greater than 10 days  (primary encounter diagnosis)  Comment: Much improved  Plan: Continue xopenex nebs bid and oxygen as " needed.  Anticipate will need oxygen long term when sleeping for her sleep apnea.  Monitor.    (I48.2) Chronic atrial fibrillation (H)  Comment: Improved with increased dose of metoprolol  Plan: Continue metoprolol for rate control and ASA for anticoagulation.  Monitor.    (I50.9) CHF (congestive heart failure), NYHA class III, unspecified failure chronicity, unspecified type (H)  Comment: Chronic  Plan: Continue lasix and monitoring of weights.  Slow weight gain in past year is likely secondary to her morbid obesity vs CHF.    (G47.33) JAMES (obstructive sleep apnea)  Comment: Chronic, but pt refuses CPAP  Plan: Continue with oxygen when sleeping. Will likely need this long term.    (F33.41) Recurrent major depressive disorder, in partial remission (H)  Comment: Ongoing  Plan: To see her psychologist today.  Taper off REmeron and start TRazodone at HS.  Monitor for sleep improvement.     (E11.9) Type 2 diabetes mellitus without complication, without long-term current use of insulin (H)  Comment: Chronic, blood sugars improved off prednisone.  Plan: Decrease frequency of accuchecks to bid with sliding scale.     (G31.84) Mild cognitive impairment with memory loss  Comment: Ongoing  Plan: Continue to provide 24 hour skilled nsg care and monitor for further progression of memory loss.    Total time spent with patient visit at the skilled nursing facility was 39 min including patient visit, review of past records and discussion with nsg and . Greater than 50% of total time spent with counseling and coordinating care due to above noted issues.    Electronically signed by  SABINE Borjas CNP

## 2018-06-05 NOTE — PROGRESS NOTES
"[facility]    Chief Complaint   Patient presents with     snf Acute     [facility] Record Number:  [id number]    Time spent with patient: 2:50-3:58    HPI:    Mireille Taylor is a 88 year old  (8/2/1929), who is being seen today for an episodic care visit at [facility].  HPI information obtained from: facility chart records, facility staff, patient report and [facility] chart review.Today's concern is:  Moderate persistent reactive airway disease without complication  Pt continues on oxygen via NC as well as xopenex nebs. She believes that she continues to need oxygen as she feels anxious without it.  Has a pessimistic outlook, but her son reports that this has been a pattern for her for much of her life.  She has expressed to him for years that she is ready to die and recently to staff, she has stated that she feels that she is dying and is resistant to cooperating with recommended activities.  Is currently in Physical Therapy for work on transfers although she has no recall of this. The physical therapist reports that there are times that she can transfer with minimal assistance and other times, when she will state that she can't and requires mechanical assistance.  Difficult to predict when she will need assistance.    CHF (congestive heart failure), NYHA class III, unspecified failure chronicity, unspecified type (H)  Remains on lasix.  Continues with LE edema and SOB as noted above.    JAYME (generalized anxiety disorder)  Pt has been on benzodiazepines for over 40 years.  In March, she c/o feeling tired during the day.  She had been taking a prn valium many nights at around 2am, because she would awake and \"likes the way it makes me feel\".  She has used 2.5mg at HS for many years for sleep and anxiety.  Is now feeling more anxious and wants \"more valium\".  Lengthy discussion regarding her beliefs of persecution by staff.  When asked to provide an " "example, she brought up the mechanical transfer device and felt that they were abusing her by making her use it.  When I brought up the fact that she weighs 240 bs and that if she is not safe to transfer with their assistance, that it is for her protection as well as theirs, to use the device.  She eventually stated that she understood that. She then expressed unhappiness with other residents and their \"constant coughing\", making me sick.  Discussed her use of benzodiazepines over the years and the use of valium, which is long acting. Discussed possible use of ativan in the morning and bedtime and she would like to try this to improve her anxiety and then possibly her perception of what is occurring around her.    Chronic atrial fibrillation (H)  Remains on low dose metoprolol which has been controlling her heart rate.  On ASA for anticoagulation.  She declined use of coumadin.    Mild cognitive impairment with memory loss  Memory loss is increasing.  She has demonstrated to family and staff increasing confusion over recent events and details. BIMS score of 10 in May.    Moderate recurrent major depression (H)  Remains on high dose citalopram as well as HS trazodone for sleep.  She has no recall of being treated for depression and was surprised to hear that when she was admitted to [facility] in August of 2015, she was on the current dose of citalopram.  She does admit to seeing a psychologist, but stated \"they let me vent, but then they don't do anything about it\".  Did have a good PHQ 9 score of 15 in May, but mood can be variable.  Denies suicidal thoughts, but feels that she is \"ready to die\".      ALLERGIES: Bactrim [sulfamethoxazole w-trimethoprim]; Gabapentin; Lyrica; Methadone; Nsaids; Penicillin g; Tramadol; Cephalexin hcl; Clindamycin hcl; and Macrobid [nitrofurantoin]  Past Medical, Surgical, Family and Social History reviewed and updated in [facility].    Current Outpatient Prescriptions   Medication Sig " Dispense Refill     Acetaminophen (TYLENOL PO) Take 650 mg by mouth 4 times daily       Ascorbic Acid 1000 MG TABS Take 1,000 mg by mouth daily       aspirin 81 MG EC tablet Take 1 tablet (81 mg) by mouth daily 90 tablet 3     chlorhexidine (PERIDEX) 0.12 % solution Swish and spit in mouth 2 times daily Rinse with 1/2 oz BID for 30 secs and expectorate       citalopram (CELEXA) 40 MG tablet TAKE 1 TABLET BY MOUTH DAILY 90 tablet 1     Cranberry (CRANBERRY CONCENTRATE) 500 MG CAPS Take 1 capsule (500 mg) by mouth 2 times daily 90 capsule      diazepam (VALIUM) 5 MG tablet 2.5 mg at HS 60 tablet      estradiol (ESTRACE VAGINAL) 0.1 MG/GM cream Place 2 g vaginally three times a week 42.5 g      furosemide (LASIX) 20 MG tablet Take 2 tablets (40 mg) by mouth 2 times daily 90 tablet 4     INSULIN ASPART SC Inject Subcutaneous 3 times daily (with meals) Inject per sliding scale:  200-249 = 2U, 250-299 = 4U, 300-349 = 6U, 350-400 = 8U, 401+ =  10U.       levalbuterol (XOPENEX) 0.63 MG/3ML neb solution Take 3 mLs (0.63 mg) by nebulization 2 times daily 360 mL      levalbuterol (XOPENEX) 0.63 MG/3ML neb solution Take 3 mLs (0.63 mg) by nebulization every 8 hours as needed for shortness of breath / dyspnea or wheezing And bid for two weeks. 360 mL      levothyroxine (SYNTHROID, LEVOTHROID) 125 MCG tablet Take 1 tablet (125 mcg) by mouth daily 90 tablet 1     metFORMIN (GLUCOPHAGE) 500 MG tablet Take 1.5 tablets (750 mg) by mouth 2 times daily (with meals) 180 tablet 1     methenamine hippurate (HIPREX) 1 G TABS TAKE 1 TABLET BY MOUTH ONCE A DAY.  TAKE ALONG WITH 1000MG VITAMIN-C 180 tablet 4     metoprolol tartrate (LOPRESSOR) 25 MG tablet Take 1 tablet (25 mg) by mouth 2 times daily Hold for systolic BP <100 or pulse <60. 90 tablet 3     mirabegron (MYRBETRIQ) 50 MG 24 hr tablet Take 1 tablet (50 mg) by mouth daily 30 tablet 11     Multiple Vitamins-Minerals (PRESERVISION AREDS 2 PO) Take 1 tablet by mouth 2 times daily        OxyCODONE HCl (ROXICODONE PO) Take 5 mg by mouth 2 times daily Give at 0630 and 2100. And q6h prn       pantoprazole (PROTONIX) 40 MG enteric coated tablet Take 1 tablet (40 mg) by mouth daily Take 30-60 minutes before a meal. 180 tablet 3     polyethylene glycol (MIRALAX/GLYCOLAX) powder Take 17 g by mouth daily as needed for constipation 119 g      potassium chloride SA (K-DUR/KLOR-CON M) 10 MEQ CR tablet Take 2 tablets (20 mEq) by mouth daily 90 tablet 1     Pseudoephedrine-DM-GG (ROBITUSSIN CF PO) Take 5 mLs by mouth 4 times daily as needed        Selenium Sulfide 2.25 % SHAM Externally apply 1 Application topically twice a week       sodium chloride ([employer] SALINE NASAL SPRAY) 0.65 % nasal spray Spray 2 sprays into both nostrils 2 times daily       traZODone (DESYREL) 50 MG tablet Take 1 tablet (50 mg) by mouth At Bedtime 30 tablet 1     VITAMIN D, CHOLECALCIFEROL, PO Take 2,000 Units by mouth daily       Medications reviewed:  Medications reconciled to facility chart and changes were made to reflect current medications as identified as above med list. Below are the changes that were made:   Medications stopped since last EPIC medication reconciliation:   There are no discontinued medications.    Medications started since last [facility] medication reconciliation:  No orders of the defined types were placed in this encounter.    Patient Active Problem List   Diagnosis     GERD (gastroesophageal reflux disease)     Systolic murmur     CARDIOVASCULAR SCREENING; LDL GOAL LESS THAN 100     Osteoporosis     Peripheral neuropathy     Anxiety     Vitamin D deficiency     Mixed incontinence urge and stress     Health Care Home     Frequent UTI, Dr Bigg Muhammad, Urologist     Candidiasis of skin     Dependent edema     Benzodiazepine dependence, pt reports daily use of valium since the 1930's.     Advance Care Planning     Constipation     Type 2 diabetes mellitus without complication (H)     Postsurgical hypothyroidism,  "Surgical excision in her 20's.     Chronic low back pain with bilateral sciatica     Macular degeneration (senile) of retina     Recurrent major depressive disorder, in partial remission (H)     Seborrheic dermatitis     Morbid obesity (H)     BMI 35.0-35.9,adult     Leg skin lesion, left     Chronic atrial fibrillation (H)     Reactive airway disease without complication     Mild cognitive impairment with memory loss     JAMES (obstructive sleep apnea)     CHF (congestive heart failure), NYHA class III, unspecified failure chronicity, unspecified type (H)     JAYME (generalized anxiety disorder)       REVIEW OF SYSTEMS:  Negative selected, CONSTITUTIONAL: weight loss, w, poor appetite and fevers, Positive for 30 lbs weight gain in past 18 months.  Current weight is stable.. EYES: tearing, redness and Positive for eyeglasses and macular degeneration.  Followed by Dr. Carlson ENT: [place], dysphagia and positive for h/o epistaxis,  CV: chest pain and Positive for dependent edema and atrial fibrillation.., RESPIRATORY: , SEe HPI ., : Positive for frequent UTI's and dribble incontinence. Has implanted stimulator for bladder control.... GI: N&V.  Positive for occasional loose stools and constipation.  H/o occ rectal bleeding.. NEURO: headaches and Positive for variable memory issues - son reports it is dependent on how much valium and oxycodone she has taken & pt agrees and MUSCULOSKELETAL: Positive for chronic back pain and right hand pain .      Physical Exam:  /61  Pulse 84  Temp 97.2  F (36.2  C)  Ht 5' 6\" (1.676 m)  Wt 241 lb 8 oz (109.5 kg)  BMI 38.98 kg/m2  GENERAL APPEARANCE: Alert, overweight female seen.  Resting in bed,watching talk TV. Skin warm and dry.  Pink facial color.   Able to  express self verbally.  HEAD:  Normocephalic.  No facial asymmetry..  ENT: Mouth and posterior oropharynx normal, moist mucous membranes, .  EYES: EOM normal, conjunctiva and lids normal.   RESP:  Diminished lung sounds " bilateral lower lobes.  No wheezing or rhonchi.  Old surgical scar from thyroidectomy visible at base of neck and upper chest.. Oxygen on at 2 l/min via NC>  No cough during visit..  CV: Irregularly, irregular rate and rhythm, Rate is controlled.  2/6 systolic murmur. +2 ankle and pedal edema.  DP pulses +1 bilateral  No pedal wounds._  ABDOMEN: Large, rounded abdomen. BS's positive all 4 quadrants. No discomfort with palpation of abdomen including suprapubic area.  MUSC:  NO pain with palpation of spinal process or with ROM of lower extremities and hips.   NEURO:  Alert and oriented X3.  Purposeful movement of extremities without focal weakness, although legs are weaker than arms due to deconditioning. No tremors or cogwheeling.  Strong hand grasp bilaterally.  PSYCH:  Concerned about her health and anxious about what is being done.  Lengthy discussion regarding current stable health as well as her feelings of anxiety and persecution as detailed above.    Recent Labs:   CBC RESULTS:   Recent Labs   Lab Test 05/07/18 05/03/18 04/26/18 09/21/13   1455  06/01/11   1845   WBC  5.9   --   6.4   < >  10.9   --    RBC  4.18   --   4.33   < >  4.77   --    HGB  12.2  13.3  13.0   < >  14.9  15.0   HCT  41.6   --   42.3   < >  43.1  49.2   MCV  99.5   --   97.7   < >  90  94.1   MCH   --    --    --    --   31.2  28.7   MCHC   --    --    --    --   34.6  30.5*   RDW  13.5   --   13.5   < >  12.7  13.7   PLT  200   --   188   < >  128*   --     < > = values in this interval not displayed.       Last Basic Metabolic Panel:  Recent Labs   Lab Test 05/07/18 05/03/18   NA  140  138   POTASSIUM  4.9  4.4   CHLORIDE  98  98   KURTIS  9.8  10.0   CO2  34*  31   BUN  26  25   CR  0.76  0.83   GLC  229*  223*     GFR Estimate   Date Value Ref Range Status   05/07/2018 >60 >60 ml/min/1.73m2 Final   05/03/2018 >60 >60 mL/min/1.73m2 Final   04/26/2018 >60 >60 mL/min/1.73m2 Final   02/05/2018 >60 >60 mL/min/1.73m2 Final   11/21/2017 >60  >60 ml/min/1.73m2 Final       TSH   Date Value Ref Range Status   02/05/2018 2.94 0.30 - 4.50 uIU/mL Final   02/02/2017 0.60 0.20 - 4.50 mcU/mL Final   02/02/2017 0.60 0.20 - 4.50 ulU/ml Final     Lab Results   Component Value Date    A1C 6.9 02/05/2018    A1C 6.0 08/21/2017     Family COmmunication:  20 min conversation with son Raudel to review above condition as well as comments and past psychiatric life.  He feels that the person we are now seeing, is the mom that he has known all of his life.  He feels that she has given up on life some time ago (before NH) and self isolated and stopped taking care of herself.  Reviewed the plans to try ativan vs more valium, as it may be better tolerated and he strongly supports this.  He has seen progression of her memory loss as well and recognizes that she does not every want to be hospitalized.  Would only want acute tx at the [facility] home and hospice when she would qualify. Will continue to monitor based on care plan changes and Dr. Leon to see on Monday.    Assessment/Plan:  (J45.40) Moderate persistent reactive airway disease without complication  (primary encounter diagnosis)  Comment: Ongoing  Plan: Continue oxygen for comfort and xopenex nebs.  Start incentive spirometry qid and encourage pt to be upright for longer periods during the day.  Repeat chest XRay in morning.  CBC and BMP in morning.  Dr. Leon to see on Monday.    (I50.9) CHF (congestive heart failure), NYHA class III, unspecified failure chronicity, unspecified type (H)  Comment: Chronic  Plan: Increase morning lasix to 60 mg and continue 40 mg at noon.  BMP tomorrow as well as Monday.  Monitor weights.    (F41.1) JAYME (generalized anxiety disorder)  Comment: Long standing  Plan: Beginning tomorrow start ativan 0.25mg in the morning and 0.5 mg at HS.  Monitor for effect.    (I48.2) Chronic atrial fibrillation (H)  Comment: Rate controlled  Plan: Continue metoprolol and ASA.  Monitorl    (G31.84) Mild cognitive  impairment with memory loss  Comment: Declining.  Plan: Continue to provide assistance with cares and monitor for expected progression of memory loss.  If paranoid delusions worsen, will likely have vascular dementia and may need antipsychotics.    (F33.1) Moderate recurrent major depression (H)  Comment: Chronic  Plan: Continue current dose of antidepressant and psychologist visits.     Total time spent with patient visit at the [facility] nursing facility was 68 min including patient visit, review of past records, phone call to patient contact and see above noted time.. Greater than 50% of total time spent with counseling and coordinating care due to all of the above noted issues.    Electronically signed by  SABINE Borjas CNP

## 2018-06-06 PROBLEM — F41.1 GAD (GENERALIZED ANXIETY DISORDER): Status: ACTIVE | Noted: 2018-01-01

## 2018-06-07 NOTE — PROGRESS NOTES
[place] [facility]    Chief Complaint   Patient presents with     GREGORYROSHAN     [place] Medical Record Number:  [id number]    HPI:    Mireille Taylor is a 88 year old  (8/2/1929), who is being seen today for an episodic care visit at [facility].  HPI information obtained from: facility chart records, facility staff, patient report and [facility] chart review.Today's concern is:  Pneumonia of both lower lobes due to infectious organism  Pt remains on Levaquin.  Still requires oxygen to maintain sats >90%, but reports that she is feeling better.  Working with her incentive spirometry. Up for meals and is staying up longer.  Mood improved.  Appetite fair, but is taking fluids well.  Transferring has improved.    Metabolic acidosis  Increasing CO2 and decreasing chloride.  Has had increased lasix due to results of chest xray and SOB.  Kidney function is normal.      Chronic atrial fibrillation (H)  Heart rate has improved with tx of her pneumonia and CHF, with normalization of her ventricular rate.  Remains on metoprolol and ASA..      Morbid obesity (H)  Weight remains high.  No longer ambulates.    CHF (congestive heart failure), NYHA class III, unspecified failure chronicity, unspecified type (H)  SOB improved today. Continues with LE edema.  Weight today is likely inaccurate as it reflects 20 lbs weight gain in 3 days.  Her appearance does not show this.     JAYME (generalized anxiety disorder)  Calmer on the ativan.  Much less anger and more cooperative.    ALLERGIES: Bactrim [sulfamethoxazole w-trimethoprim]; Gabapentin; Lyrica; Methadone; Nsaids; Penicillin g; Tramadol; Cephalexin hcl; Clindamycin hcl; and Macrobid [nitrofurantoin]  Past Medical, Surgical, Family and Social History reviewed and updated in [facility].    Current Outpatient Prescriptions   Medication Sig Dispense Refill     Acetaminophen (TYLENOL PO) Take 650 mg by mouth 4 times daily       Ascorbic Acid 1000 MG TABS  Take 1,000 mg by mouth daily       aspirin 81 MG EC tablet Take 1 tablet (81 mg) by mouth daily 90 tablet 3     chlorhexidine (PERIDEX) 0.12 % solution Swish and spit in mouth 2 times daily Rinse with 1/2 oz BID for 30 secs and expectorate       citalopram (CELEXA) 40 MG tablet TAKE 1 TABLET BY MOUTH DAILY 90 tablet 1     Cranberry (CRANBERRY CONCENTRATE) 500 MG CAPS Take 1 capsule (500 mg) by mouth 2 times daily 90 capsule      estradiol (ESTRACE VAGINAL) 0.1 MG/GM cream Place 2 g vaginally three times a week 42.5 g      furosemide (LASIX) 20 MG tablet 60 mg in am and 40 mg at noon 90 tablet 4     INSULIN ASPART SC Inject Subcutaneous 3 times daily (with meals) Inject per sliding scale:  200-249 = 2U, 250-299 = 4U, 300-349 = 6U, 350-400 = 8U, 401+ =  10U.       levalbuterol (XOPENEX) 0.63 MG/3ML neb solution Take 3 mLs (0.63 mg) by nebulization 2 times daily 360 mL      levalbuterol (XOPENEX) 0.63 MG/3ML neb solution Take 3 mLs (0.63 mg) by nebulization every 8 hours as needed for shortness of breath / dyspnea or wheezing And bid for two weeks. 360 mL      LevoFLOXacin (LEVAQUIN PO) Take 500 mg by mouth daily       levothyroxine (SYNTHROID, LEVOTHROID) 125 MCG tablet Take 1 tablet (125 mcg) by mouth daily 90 tablet 1     LORazepam (ATIVAN) 0.5 MG tablet 0.25 mg in am and 0.5 mg at HS. 4 tablet 0     metFORMIN (GLUCOPHAGE) 500 MG tablet Take 1.5 tablets (750 mg) by mouth 2 times daily (with meals) 180 tablet 1     methenamine hippurate (HIPREX) 1 G TABS TAKE 1 TABLET BY MOUTH ONCE A DAY.  TAKE ALONG WITH 1000MG VITAMIN-C 180 tablet 4     metoprolol tartrate (LOPRESSOR) 25 MG tablet Take 1 tablet (25 mg) by mouth 2 times daily Hold for systolic BP <100 or pulse <60. 90 tablet 3     mirabegron (MYRBETRIQ) 50 MG 24 hr tablet Take 1 tablet (50 mg) by mouth daily 30 tablet 11     Multiple Vitamins-Minerals (PRESERVISION AREDS 2 PO) Take 1 tablet by mouth 2 times daily       OxyCODONE HCl (ROXICODONE PO) Take 5 mg by  mouth 2 times daily Give at 0630 and 2100. And q6h prn       pantoprazole (PROTONIX) 40 MG enteric coated tablet Take 1 tablet (40 mg) by mouth daily Take 30-60 minutes before a meal. 180 tablet 3     polyethylene glycol (MIRALAX/GLYCOLAX) powder Take 17 g by mouth daily as needed for constipation 119 g      potassium chloride SA (K-DUR/KLOR-CON M) 10 MEQ CR tablet Take 2 tablets (20 mEq) by mouth daily 90 tablet 1     Pseudoephedrine-DM-GG (ROBITUSSIN CF PO) Take 5 mLs by mouth 4 times daily as needed        Selenium Sulfide 2.25 % SHAM Externally apply 1 Application topically twice a week       sodium chloride ([employer] SALINE NASAL SPRAY) 0.65 % nasal spray Spray 2 sprays into both nostrils 2 times daily       traZODone (DESYREL) 50 MG tablet Take 1 tablet (50 mg) by mouth At Bedtime 30 tablet 1     VITAMIN D, CHOLECALCIFEROL, PO Take 2,000 Units by mouth daily       Medications reviewed:  Medications reconciled to facility chart and changes were made to reflect current medications as identified as above med list. Below are the changes that were made:   Medications stopped since last EPIC medication reconciliation:   There are no discontinued medications.    Medications started since last [facility] medication reconciliation:  No orders of the defined types were placed in this encounter.    Patient Active Problem List   Diagnosis     GERD (gastroesophageal reflux disease)     Systolic murmur     CARDIOVASCULAR SCREENING; LDL GOAL LESS THAN 100     Osteoporosis     Peripheral neuropathy     Anxiety     Vitamin D deficiency     Mixed incontinence urge and stress     Health Care Home     Frequent UTI, Dr Bigg Muhammad, Urologist     Candidiasis of skin     Dependent edema     Benzodiazepine dependence, pt reports daily use of valium since the 1930's.     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     Macular degeneration (senile) of retina     Recurrent major depressive disorder, in partial remission (H)     Seborrheic dermatitis     Morbid obesity (H)     BMI 35.0-35.9,adult     Leg skin lesion, left     Chronic atrial fibrillation (H)     Reactive airway disease without complication     Mild cognitive impairment with memory loss     JAMES (obstructive sleep apnea)     CHF (congestive heart failure), NYHA class III, unspecified failure chronicity, unspecified type (H)     JAYME (generalized anxiety disorder)       REVIEW OF SYSTEMS:  Negative selected, CONSTITUTIONAL: weight loss, w, poor appetite and fevers, Positive for 30 lbs weight gain in past 18 months.  Current weight is stable.. EYES: tearing, redness and Positive for eyeglasses and macular degeneration.  Followed by Dr. Carlson ENT: [place], dysphagia and positive for h/o epistaxis,  CV: chest pain and Positive for dependent edema and atrial fibrillation.., RESPIRATORY: , SEe HPI ., : Positive for frequent UTI's and dribble incontinence. Has implanted stimulator for bladder control.... GI: N&V.  Positive for occasional loose stools and constipation.  H/o occ rectal bleeding.. NEURO: headaches and Positive for variable memory issues - son reports it is dependent on how much valium and oxycodone she has taken & pt agrees and MUSCULOSKELETAL: Positive for chronic back pain and right hand pain .    Physical Exam:  /85  Pulse 89  Temp 97  F (36.1  C)  Resp 18  Ht 5' 6\" (1.676 m)  Wt 241 lb 8 oz (109.5 kg)  SpO2 94%  BMI 38.98 kg/m2  GENERAL APPEARANCE: Alert, overweight female seen.  Up in custom electric scooter.  Color improved in past 72 hours.  Skin warm and dry.  Pink facial color.   Able to  express self verbally.  HEAD:  Normocephalic.  No facial asymmetry..  ENT: Mouth and posterior oropharynx normal, moist mucous membranes, .  EYES: EOM normal, conjunctiva and lids normal.   RESP:  Diminished lung sounds " bilateral lower lobes.  No wheezing or rhonchi.  Old surgical scar from thyroidectomy visible at base of neck and upper chest.. Oxygen on at 3 l/min via NC>  No cough during visit..  CV: Irregularly, irregular rate and rhythm, Rate is controlled.  2/6 systolic murmur. +2 ankle and pedal edema.  DP pulses +1 bilateral  No pedal wounds._  ABDOMEN: Large, rounded abdomen. BS's positive all 4 quadrants. No discomfort with palpation of abdomen including suprapubic area.  MUSC:  NO pain with palpation of spinal process or with ROM of lower extremities and hips.   NEURO:  Alert and oriented X3.  Purposeful movement of extremities without focal weakness, although legs are weaker than arms due to deconditioning. No tremors or cogwheeling.  Strong hand grasp bilaterally.  PSYCH:  Brighter affect and more positive outlook. Expressing gratitude for her cares.       Recent Labs:   CBC RESULTS:   Recent Labs   Lab Test 06/07/18 05/07/18 09/21/13   1455  06/01/11   1845   WBC  6.3  5.9   < >  10.9   --    RBC  4.35  4.18   < >  4.77   --    HGB  12.5  12.2   < >  14.9  15.0   HCT  43.2  41.6   < >  43.1  49.2   MCV  99.3  99.5   < >  90  94.1   MCH   --    --    --   31.2  28.7   MCHC   --    --    --   34.6  30.5*   RDW  15.6*  13.5   < >  12.7  13.7   PLT  162  200   < >  128*   --     < > = values in this interval not displayed.       Last Basic Metabolic Panel:  6/8/18:  Glucose: 105, BUN: 12, Creatinine: 0.81, GFR: >60, Na: 141, K: 3.9, Chl: 89, CO2: 44, calcium: 9.8,  Recent Labs   Lab Test 06/07/18 05/07/18   NA  140  140   POTASSIUM  4.1  4.9   CHLORIDE  90*  98   KURTIS  10.1  9.8   CO2  40*  34*   BUN  14  26   CR  0.70  0.76   GLC  157*  229*     GFR Estimate   Date Value Ref Range Status   06/07/2018 >60 >60 mL/min/1.73m2 Final   05/07/2018 >60 >60 ml/min/1.73m2 Final   05/03/2018 >60 >60 mL/min/1.73m2 Final   04/26/2018 >60 >60 mL/min/1.73m2 Final   02/05/2018 >60 >60 mL/min/1.73m2 Final       TSH   Date Value Ref  Range Status   02/05/2018 2.94 0.30 - 4.50 uIU/mL Final   02/02/2017 0.60 0.20 - 4.50 mcU/mL Final   02/02/2017 0.60 0.20 - 4.50 ulU/ml Final       Lab Results   Component Value Date    A1C 6.9 02/05/2018    A1C 6.0 08/21/2017     Assessment/Plan:  (J18.9) Pneumonia of both lower lobes due to infectious organism  (primary encounter diagnosis)  Comment: Slowly improving  Plan: Continue Levaquin.  Continue oxygen, but attempt tapers when up for meals.  Monitor VS's.  Recheck CBC on Monday.  Continue with incentive spirometry.      (E87.2) Metabolic acidosis  Comment: Ongoing  Plan: Hold today's noon lasix.  Starting tomorrow reduce lasix to 40 mg every morning.  Repeat BMP on 6/11.     (I48.2) Chronic atrial fibrillation (H)  Comment: Rate improved  Plan: Continue metoprolol and ASA.  Monitor.    (E66.01) Morbid obesity (H)  Comment: Chronic  Plan: Monitor.    (I50.9) CHF (congestive heart failure), NYHA class III, unspecified failure chronicity, unspecified type (H)  Comment: Chronic  Plan: Reduce dose of lasix due to acidosis.  Monitor weights and overall status.  Dr. Leon to see on Monday.  BMP and BNP on MOnday.     (F41.1) JAYME (generalized anxiety disorder)  Comment: Lifetime benzodiazepine dependent, seems improved on ativan  Plan:  Continue ativan and monitoring.     Total time spent with patient visit at the skilled nursing facility was 38 min including patient visit, review of past records and phone call to patient contact. Greater than 50% of total time spent with counseling and coordinating care due to pneumonia, acidosis and CHF.    Electronically signed by  SABINE Borjas CNP

## 2018-06-11 NOTE — PROGRESS NOTES
[place] [facility]    Chief Complaint   Patient presents with     ARNOLD     [place] Medical Record Number:  [id number]    HPI:    Mireille Taylor is a 88 year old  (8/2/1929), who is being seen today for an episodic care visit at [facility].  HPI information obtained from: facility chart records, facility staff, patient report and [facility] chart review.Today's concern is:  Hypoxia  Pt has ongoing need for oxygen following tx for pneumonia that required extensive tx.  Staff have been attempting to wean her oxygen flow rate.  Did go off oxygen for a short period of time this morning, but sats decreased to 77% and it took 2l/min to return to 97%.  Pulse rate also increased with her low sats. Levaquin completed yesterday.    Respiratory Acidosis  Has had a high CO2 and low chloride level for the past week, although labs today showed improvement following the decrease of O2 in past two days.    Type 2 diabetes mellitus without complication, without long-term current use of insulin (H)  Accuchecks in past 4 days: 0730;129-180, 1700:  155-164.    Morbid obesity (H)  Weight remains high.  No longer ambulatory    Chronic atrial fibrillation (H)  Heart rate has been below 100/min until hypoxic episode earlier today.  Remains on metoprolol and ASA.    CHF (congestive heart failure), NYHA class III, unspecified failure chronicity, unspecified type (H)  Ongoing LE edema and SOB.  Lasix dose decreased on 6/8 due to high CO2.  No change in LE edema.    ALLERGIES: Bactrim [sulfamethoxazole w-trimethoprim]; Gabapentin; Lyrica; Methadone; Nsaids; Penicillin g; Tramadol; Cephalexin hcl; Clindamycin hcl; and Macrobid [nitrofurantoin]  Past Medical, Surgical, Family and Social History reviewed and updated in [facility].    Current Outpatient Prescriptions   Medication Sig Dispense Refill     Acetaminophen (TYLENOL PO) Take 650 mg by mouth 4 times daily       Ascorbic Acid 1000 MG TABS Take 1,000 mg  by mouth daily       aspirin 81 MG EC tablet Take 1 tablet (81 mg) by mouth daily 90 tablet 3     chlorhexidine (PERIDEX) 0.12 % solution Swish and spit in mouth 2 times daily Rinse with 1/2 oz BID for 30 secs and expectorate       citalopram (CELEXA) 40 MG tablet TAKE 1 TABLET BY MOUTH DAILY 90 tablet 1     Cranberry (CRANBERRY CONCENTRATE) 500 MG CAPS Take 1 capsule (500 mg) by mouth 2 times daily 90 capsule      estradiol (ESTRACE VAGINAL) 0.1 MG/GM cream Place 2 g vaginally three times a week 42.5 g      furosemide (LASIX) 20 MG tablet 40 mg every morning 90 tablet 4     INSULIN ASPART SC Inject Subcutaneous 3 times daily (with meals) Inject per sliding scale:  200-249 = 2U, 250-299 = 4U, 300-349 = 6U, 350-400 = 8U, 401+ =  10U.       levalbuterol (XOPENEX) 0.63 MG/3ML neb solution Take 3 mLs (0.63 mg) by nebulization 2 times daily 360 mL      levalbuterol (XOPENEX) 0.63 MG/3ML neb solution Take 3 mLs (0.63 mg) by nebulization every 8 hours as needed for shortness of breath / dyspnea or wheezing And bid for two weeks. 360 mL      levothyroxine (SYNTHROID, LEVOTHROID) 125 MCG tablet Take 1 tablet (125 mcg) by mouth daily 90 tablet 1     LORazepam (ATIVAN) 0.5 MG tablet 0.25 mg in am and 0.5 mg at HS. 4 tablet 0     metFORMIN (GLUCOPHAGE) 500 MG tablet Take 1.5 tablets (750 mg) by mouth 2 times daily (with meals) 180 tablet 1     methenamine hippurate (HIPREX) 1 G TABS TAKE 1 TABLET BY MOUTH ONCE A DAY.  TAKE ALONG WITH 1000MG VITAMIN-C 180 tablet 4     metoprolol tartrate (LOPRESSOR) 25 MG tablet Take 1 tablet (25 mg) by mouth 2 times daily Hold for systolic BP <100 or pulse <60. 90 tablet 3     mirabegron (MYRBETRIQ) 50 MG 24 hr tablet Take 1 tablet (50 mg) by mouth daily 30 tablet 11     Multiple Vitamins-Minerals (PRESERVISION AREDS 2 PO) Take 1 tablet by mouth 2 times daily       OxyCODONE HCl (ROXICODONE PO) Take 5 mg by mouth 2 times daily Give at 0630 and 2100. And q6h prn       pantoprazole (PROTONIX) 40  MG enteric coated tablet Take 1 tablet (40 mg) by mouth daily Take 30-60 minutes before a meal. 180 tablet 3     polyethylene glycol (MIRALAX/GLYCOLAX) powder Take 17 g by mouth daily as needed for constipation 119 g      potassium chloride SA (K-DUR/KLOR-CON M) 10 MEQ CR tablet Take 2 tablets (20 mEq) by mouth daily 90 tablet 1     Pseudoephedrine-DM-GG (ROBITUSSIN CF PO) Take 5 mLs by mouth 4 times daily as needed        Selenium Sulfide 2.25 % SHAM Externally apply 1 Application topically twice a week       sodium chloride ([employer]S SALINE NASAL SPRAY) 0.65 % nasal spray Spray 2 sprays into both nostrils 2 times daily       traZODone (DESYREL) 50 MG tablet Take 1 tablet (50 mg) by mouth At Bedtime 30 tablet 1     VITAMIN D, CHOLECALCIFEROL, PO Take 2,000 Units by mouth daily       Medications reviewed:  Medications reconciled to facility chart and changes were made to reflect current medications as identified as above med list. Below are the changes that were made:   Medications stopped since last EPIC medication reconciliation:   There are no discontinued medications.    Medications started since last [facility] medication reconciliation:  No orders of the defined types were placed in this encounter.    Patient Active Problem List   Diagnosis     GERD (gastroesophageal reflux disease)     Systolic murmur     CARDIOVASCULAR SCREENING; LDL GOAL LESS THAN 100     Osteoporosis     Peripheral neuropathy     Anxiety     Vitamin D deficiency     Mixed incontinence urge and stress     Health Care Home     Frequent UTI, Dr Bigg Muhammad, Urologist     Candidiasis of skin     Dependent edema     Benzodiazepine dependence, pt reports daily use of valium since the 1930's.     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     Chronic low back pain with bilateral sciatica     Macular degeneration (senile) of retina      "Recurrent major depressive disorder, in partial remission (H)     Seborrheic dermatitis     Morbid obesity (H)     BMI 35.0-35.9,adult     Leg skin lesion, left     Chronic atrial fibrillation (H)     Reactive airway disease without complication     Mild cognitive impairment with memory loss     JAMES (obstructive sleep apnea)     CHF (congestive heart failure), NYHA class III, unspecified failure chronicity, unspecified type (H)     JAYME (generalized anxiety disorder)       REVIEW OF SYSTEMS:  Negative selected, CONSTITUTIONAL: weight loss, w, poor appetite and fevers, Positive for 30 lbs weight gain in past 18 months.  Current weight is stable.. EYES: tearing, redness and Positive for eyeglasses and macular degeneration.  Followed by Dr. Carlson ENT: [place], dysphagia and positive for h/o epistaxis,  CV: chest pain and Positive for dependent edema and atrial fibrillation.., RESPIRATORY: , SEe HPI ., : Positive for frequent UTI's and dribble incontinence. Has implanted stimulator for bladder control.... GI: N&V.  Positive for occasional loose stools and constipation.  H/o occ rectal bleeding.. NEURO: headaches and Positive for variable memory issues - son reports it is dependent on how much valium and oxycodone she has taken & pt agrees and MUSCULOSKELETAL: Positive for chronic back pain and right hand pain .    Physical Exam:  /67  Pulse 88  Temp 96.7  F (35.9  C)  Resp 20  Ht 5' 6\" (1.676 m)  Wt 260 lb (117.9 kg)  SpO2 97%  BMI 41.97 kg/m2  GENERAL APPEARANCE: Alert, overweight female seen.  Up in custom electric scooter and then seen in bed.   Skin warm and dry.  Pink facial color.   Able to  express self verbally.  HEAD:  Normocephalic.  No facial asymmetry..  ENT: Mouth and posterior oropharynx normal, moist mucous membranes, .  EYES: EOM normal, conjunctiva and lids normal.   RESP:  Diminished lung sounds bilateral lower lobes.  No wheezing or rhonchi.  Old surgical scar from thyroidectomy visible at " base of neck and upper chest.. Oxygen on at 2 l/min via NC. Decreased to 1.5 l/min during visit and sats stayed above 90% at 92%.  No cough during visit..  CV: Irregularly, irregular rate and rhythm, Rate is controlled.  2/6 systolic murmur. +2/+3 ankle and pedal edema.  DP pulses +1 bilateral  No pedal wounds._  ABDOMEN: Large, rounded abdomen. BS's positive all 4 quadrants. No discomfort with palpation of abdomen including suprapubic area.  MUSC:  NO pain with palpation of spinal process or with ROM of lower extremities and hips.   NEURO:  Alert and oriented X3.  Purposeful movement of extremities without focal weakness, although legs are weaker than arms due to deconditioning. No tremors or cogwheeling.  Strong hand grasp bilaterally.  PSYCH:  Brighter affect and more positive outlook. Expressing gratitude for her cares.     Recent Labs:   CBC RESULTS:   Recent Labs   Lab Test 06/11/18 06/07/18 09/21/13   1455  06/01/11   1845   WBC  6.0  6.3   < >  10.9   --    RBC  4.22  4.35   < >  4.77   --    HGB  12.3  12.5   < >  14.9  15.0   HCT  42.3  43.2   < >  43.1  49.2   MCV  100.2*  99.3   < >  90  94.1   MCH   --    --    --   31.2  28.7   MCHC   --    --    --   34.6  30.5*   RDW  15.8*  15.6*   < >  12.7  13.7   PLT  138*  162   < >  128*   --     < > = values in this interval not displayed.       Last Basic Metabolic Panel:  6/14/18:  BUN: 16, creatinine: 0.84, ets GFR: >60, Na: 140, K: 4.5, Chl: 92, CO2: 43  Recent Labs   Lab Test 06/11/18 06/08/18   NA  139  141   POTASSIUM  4.2  3.9   CHLORIDE  89*  89*   KURTIS  10.3  9.8   CO2  45*  44*   BUN  15  12   CR  0.90  0.81   GLC  139*  105*     GFR Estimate   Date Value Ref Range Status   06/14/2018 >60 >60 ml/min/1.73m2 Final   06/11/2018 57 (L) >60 mL/min/1.73m2 Final   06/11/2018 57 (L) >60 mL/min/1.73m2 Final   06/08/2018 >60 >60 ml/min/1.73m2 Final   06/07/2018 >60 >60 mL/min/1.73m2 Final       TSH   Date Value Ref Range Status   02/05/2018 2.94 0.30 - 4.50  uIU/mL Final   02/02/2017 0.60 0.20 - 4.50 mcU/mL Final   02/02/2017 0.60 0.20 - 4.50 ulU/ml Final       Lab Results   Component Value Date    A1C 6.9 02/05/2018    A1C 6.0 08/21/2017     Assessment/Plan:  (R09.02) Hypoxia  (primary encounter diagnosis)  Comment: Improved with oxygen  Plan: Continue with oxygen 1.5-2l/min and monitor.    (E87.2) Respiratory Acidosis  Comment: Improving  Plan: Continue decreased flow rate of oxygen and recheck BMP on Monday.    (E11.9) Type 2 diabetes mellitus without complication, without long-term current use of insulin (H)  Comment: Chronic, but controlled with metformin  Plan: DC sliding scale, but continue accuchecks.  Recheck on Monday.    (E66.01) Morbid obesity (H)  Comment: Chronic  Plan: Continue to monitor.    (I48.2) Chronic atrial fibrillation (H)  Comment: Chronic, with RVR when she is hypoxic  Plan: Continue current POC and monitoring.    (I50.9) CHF (congestive heart failure), NYHA class III, unspecified failure chronicity, unspecified type (H)  Comment: Chronic  Plan: Continue lasix and check BMP and BNP on Monday.    Electronically signed by  SABINE Borjas CNP

## 2018-06-13 NOTE — PROGRESS NOTES
Pt was seen for a regulatory visit  Case reviewed with NP    Pt has been ill over the last several days, is being treated for B LE pneumonia, CHF    She had developed, increased fatigue, anorexia depression, was found to be hypoxic.  She has completed a course of Levaquin. Lasix dose has been increased.    Labs have revealed an increase in C02, c/w metabolic alkalosis and/or respiratory acidosis.  K and renal function have been stable    Pt's overall status has improved over the last few days  She notes less fatigue, improved appetite  She denies cough, chest pain, abd pain or dysuria    Exam  Alert, in good spirits, sitting in WC, eating breakfast  RR 12, faint crackles L base  CV irreg,HR 70s + syst M  Abd soft, non-tender  1 + LE edema B  Pt is alert, fully oriented      Assessment    Pneumonia, clinically improved with levaquin  CHF, likely worsened, in the setting of pneumonia (and transient increased HR)  Chronic afib, HR controlled with improvement in resp status  Elevated C02,, most likely representing a contraction alkalosis, less likely respiratory acidosis  DM type 2, stable on metformin    Plan  Continue close monitoring of respiratory, metabolic status  Lasix dose has been reduced  Monitor wt, exam for signs of continued volume overload  Wean off 02 as possible

## 2018-06-18 NOTE — PROGRESS NOTES
[place] [place]    Chief Complaint   Patient presents with     snf Acute     [facility] Medical Record Number:  [id number]    HPI:    Mireille Taylor is a 88 year old  (8/2/1929), who is being seen today for an episodic care visit at [facility].  HPI information obtained from: facility chart records, facility staff, patient report and [facility] chart review.Today's concern is:  Respiratory acidosis  Pt has had a lower flow rate of her oxygen in past three days.  CO2 and Chloride levels have improved. CO2 is 35 today down from 45 last week and Chloride is up to 91.   O2 sats >90% on 1.5-2l/min.  Pt had some increased fatigue and confusion this morning, but is now back to baseline.    CHF (congestive heart failure), NYHA class III, unspecified failure chronicity, unspecified type (H)  Weight is stable.  Lasix dose reduced when her electrolytes were abnormal, but her LE edema continues.     Polyneuropathy associated with underlying disease (H)  Chronic LE neuropathy for many years.     Chronic atrial fibrillation (H)  Heart rate improved since respiratory status improved.  Pulse of 107 noted yesterday, but today pulse is 88 and was 68 last evening.  No reports of chest pain.    ALLERGIES: Bactrim [sulfamethoxazole w-trimethoprim]; Gabapentin; Lyrica; Methadone; Nsaids; Penicillin g; Tramadol; Cephalexin hcl; Clindamycin hcl; and Macrobid [nitrofurantoin]  Past Medical, Surgical, Family and Social History reviewed and updated in [facility].    Current Outpatient Prescriptions   Medication Sig Dispense Refill     Acetaminophen (TYLENOL PO) Take 650 mg by mouth 4 times daily       Ascorbic Acid 1000 MG TABS Take 1,000 mg by mouth daily       aspirin 81 MG EC tablet Take 1 tablet (81 mg) by mouth daily 90 tablet 3     chlorhexidine (PERIDEX) 0.12 % solution Swish and spit in mouth 2 times daily Rinse with 1/2 oz BID for 30 secs and expectorate       citalopram (CELEXA) 40 MG  tablet TAKE 1 TABLET BY MOUTH DAILY 90 tablet 1     Cranberry (CRANBERRY CONCENTRATE) 500 MG CAPS Take 1 capsule (500 mg) by mouth 2 times daily 90 capsule      estradiol (ESTRACE VAGINAL) 0.1 MG/GM cream Place 2 g vaginally three times a week 42.5 g      furosemide (LASIX) 20 MG tablet 40 mg every morning 90 tablet 4     INSULIN ASPART SC Inject Subcutaneous 3 times daily (with meals) Inject per sliding scale:  200-249 = 2U, 250-299 = 4U, 300-349 = 6U, 350-400 = 8U, 401+ =  10U.       levalbuterol (XOPENEX) 0.63 MG/3ML neb solution Take 3 mLs (0.63 mg) by nebulization 2 times daily 360 mL      levalbuterol (XOPENEX) 0.63 MG/3ML neb solution Take 3 mLs (0.63 mg) by nebulization every 8 hours as needed for shortness of breath / dyspnea or wheezing And bid for two weeks. 360 mL      levothyroxine (SYNTHROID, LEVOTHROID) 125 MCG tablet Take 1 tablet (125 mcg) by mouth daily 90 tablet 1     LORazepam (ATIVAN) 0.5 MG tablet 0.25 mg in am and 0.5 mg at HS. 4 tablet 0     metFORMIN (GLUCOPHAGE) 500 MG tablet Take 1.5 tablets (750 mg) by mouth 2 times daily (with meals) 180 tablet 1     methenamine hippurate (HIPREX) 1 G TABS TAKE 1 TABLET BY MOUTH ONCE A DAY.  TAKE ALONG WITH 1000MG VITAMIN-C 180 tablet 4     metoprolol tartrate (LOPRESSOR) 25 MG tablet Take 1 tablet (25 mg) by mouth 2 times daily Hold for systolic BP <100 or pulse <60. 90 tablet 3     mirabegron (MYRBETRIQ) 50 MG 24 hr tablet Take 1 tablet (50 mg) by mouth daily 30 tablet 11     Multiple Vitamins-Minerals (PRESERVISION AREDS 2 PO) Take 1 tablet by mouth 2 times daily       OxyCODONE HCl (ROXICODONE PO) Take 5 mg by mouth 2 times daily Give at 0630 and 2100. And q6h prn       pantoprazole (PROTONIX) 40 MG enteric coated tablet Take 1 tablet (40 mg) by mouth daily Take 30-60 minutes before a meal. 180 tablet 3     polyethylene glycol (MIRALAX/GLYCOLAX) powder Take 17 g by mouth daily as needed for constipation 119 g      potassium chloride SA  (K-DUR/KLOR-CON M) 10 MEQ CR tablet Take 2 tablets (20 mEq) by mouth daily 90 tablet 1     Pseudoephedrine-DM-GG (ROBITUSSIN CF PO) Take 5 mLs by mouth 4 times daily as needed        Selenium Sulfide 2.25 % SHAM Externally apply 1 Application topically twice a week       sodium chloride ([employer] SALINE NASAL SPRAY) 0.65 % nasal spray Spray 2 sprays into both nostrils 2 times daily       traZODone (DESYREL) 50 MG tablet Take 1 tablet (50 mg) by mouth At Bedtime 30 tablet 1     VITAMIN D, CHOLECALCIFEROL, PO Take 2,000 Units by mouth daily       Medications reviewed:  Medications reconciled to facility chart and changes were made to reflect current medications as identified as above med list. Below are the changes that were made:   Medications stopped since last EPIC medication reconciliation:   There are no discontinued medications.    Medications started since last [facility] medication reconciliation:  No orders of the defined types were placed in this encounter.    Patient Active Problem List   Diagnosis     GERD (gastroesophageal reflux disease)     Systolic murmur     CARDIOVASCULAR SCREENING; LDL GOAL LESS THAN 100     Osteoporosis     Peripheral neuropathy     Anxiety     Vitamin D deficiency     Mixed incontinence urge and stress     Health Care Home     Frequent UTI, Dr Bigg Muhammad, Urologist     Candidiasis of skin     Dependent edema     Benzodiazepine dependence, pt reports daily use of valium since the 1930's.     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     Chronic low back pain with bilateral sciatica     Macular degeneration (senile) of retina     Recurrent major depressive disorder, in partial remission (H)     Seborrheic dermatitis     Morbid obesity (H)     BMI 35.0-35.9,adult     Leg skin lesion, left     Chronic atrial fibrillation (H)     Reactive airway disease without complication      Mild cognitive impairment with memory loss     JAMES (obstructive sleep apnea)     CHF (congestive heart failure), NYHA class III, unspecified failure chronicity, unspecified type (H)     JAYME (generalized anxiety disorder)       REVIEW OF SYSTEMS:  Negative selected, CONSTITUTIONAL: weight loss, w, poor appetite and fevers, Positive for 30 lbs weight gain in past 18 months.  Current weight is stable.. EYES: tearing, redness and Positive for eyeglasses and macular degeneration.  Followed by Dr. Carlson ENT: [place], dysphagia and positive for h/o epistaxis,  CV: chest pain and Positive for dependent edema and atrial fibrillation.., RESPIRATORY: , SEe HPI ., : Positive for frequent UTI's and dribble incontinence. Has implanted stimulator for bladder control.... GI: N&V.  Positive for occasional loose stools and constipation.  H/o occ rectal bleeding.. NEURO: headaches and Positive for variable memory issues - son reports it is dependent on how much valium and oxycodone she has taken & pt agrees and MUSCULOSKELETAL: Positive for chronic back pain and right hand pain .    Physical Exam:  /68  Pulse 88  Temp 97.6  F (36.4  C)  Resp 18  Wt 245 lb (111.1 kg)  SpO2 92%  BMI 39.54 kg/m2  GENERAL APPEARANCE: Alert, overweight female seen.  Resting in bed after lunch with HOB elevated.  Skin warm and dry.  Pink facial color.   Able to  express self verbally.  HEAD:  Normocephalic.  No facial asymmetry..  ENT: Mouth and posterior oropharynx normal, moist mucous membranes, .  EYES: EOM normal, conjunctiva and lids normal.   RESP:  Diminished lung sounds bilateral lower lobes.  No wheezing or rhonchi.  Old surgical scar from thyroidectomy visible at base of neck and upper chest.. Oxygen on at 2 l/min via NC.  No cough during visit..  CV: Irregularly, irregular rate and rhythm, Rate is controlled.  2/6 systolic murmur. +2/+3 ankle and pedal edema.  DP pulses +1 bilateral  No pedal wounds._  ABDOMEN: Large, rounded abdomen.  BS's positive all 4 quadrants. No discomfort with palpation of abdomen including suprapubic area.  MUSC:  NO pain with palpation of spinal process or with ROM of lower extremities and hips.   NEURO:  Alert and oriented X3.  Purposeful movement of extremities without focal weakness, although legs are weaker than arms due to deconditioning. No tremors or cogwheeling.  Strong hand grasp bilaterally.    Recent Labs:   6/18/18:  Hgb: 13.3  CBC RESULTS:   Recent Labs   Lab Test 06/11/18 06/07/18 09/21/13   1455  06/01/11   1845   WBC  6.0  6.0  6.3   < >  10.9   --    RBC  4.22  4.22  4.35   < >  4.77   --    HGB  12.3  12.3  12.5   < >  14.9  15.0   HCT  42.3  42.3  43.2   < >  43.1  49.2   MCV  100.2*  100.2*  99.3   < >  90  94.1   MCH   --    --    --   31.2  28.7   MCHC   --    --    --   34.6  30.5*   RDW  15.8*  15.8*  15.6*   < >  12.7  13.7   PLT  138*  138*  162   < >  128*   --     < > = values in this interval not displayed.       Last Basic Metabolic Panel:  6/18/18:  BUN: 42, Creat: 1.15, Na: 138, K: 5.3, Chl: 91, CO2: 35.   Recent Labs   Lab Test 06/14/18 06/11/18   NA  140  139  139   POTASSIUM  4.5  4.2  4.2   CHLORIDE  92*  89*  89*   KURTIS  9.9  10.3  10.3   CO2  43*  45*  45*   BUN  16  15  15   CR  0.84  0.90  0.90   GLC  108*  139*  139*       Liver Function Studies -   Recent Labs   Lab Test  05/23/14   1510  12/13/13   1305   PROTTOTAL  7.0  7.0   ALBUMIN  4.3  3.8   BILITOTAL  0.9  1.0   ALKPHOS  79  85   AST  24  35   ALT  39  46       TSH   Date Value Ref Range Status   02/05/2018 2.94 0.30 - 4.50 uIU/mL Final   02/02/2017 0.60 0.20 - 4.50 mcU/mL Final   02/02/2017 0.60 0.20 - 4.50 ulU/ml Final     Lab Results   Component Value Date    A1C 7.3 06/11/2018    A1C 6.9 02/05/2018     GFR Estimate   Date Value Ref Range Status   06/18/2018 42 (A) >60 mL/min/1.73m2 Final   06/14/2018 >60 >60 ml/min/1.73m2 Final   06/11/2018 57 (L) >60 mL/min/1.73m2 Final   06/11/2018 57 (L) >60  mL/min/1.73m2 Final   06/08/2018 >60 >60 ml/min/1.73m2 Final     Assessment/Plan:  (E87.2) Respiratory acidosis  (primary encounter diagnosis)  Comment: Improving  Plan: Continue with lowered flow rate of oxygen.  Recheck BMP in one week.    (I50.9) CHF (congestive heart failure), NYHA class III, unspecified failure chronicity, unspecified type (H)  Comment: Chronic  Plan: Increase lasix to 80 mg every morning.  BMP in one week.  Monitor weights.    (G63) Polyneuropathy associated with underlying disease (H)  Comment: Chronic  Plan: Decrease LE edema with increased lasix and monitor for any improvement of symptoms.    (I48.2) Chronic atrial fibrillation (H)  Comment: Chronic  Plan: Continue metoprolol for rate control and ASA for anticoagulation. Comfort is goal of tx.  Pt does not desire hospitalization.     Electronically signed by  SABINE Borjas CNP

## 2018-06-25 NOTE — PROGRESS NOTES
"[place] [facility]    Chief Complaint   Patient presents with     [facility]     [facility] Medical Record Number:  [id number]    HPI:    Mireille Taylor is a 88 year old  (8/2/1929), who is being seen today for an episodic care visit at [facility].  HPI information obtained from: facility chart records, patient report and [facility] chart review.Today's concern is:  CHF (congestive heart failure), NYHA class III, unspecified failure chronicity, unspecified type (H)  Continues on lasix 80 mg a day. Weight is stable at 245 lbs.  Pt remains very fatigued, but states that she is \"a little better each day\".  BNP today is 564 compared to 682 on 6/18. Requires mechanical lift to transfer.  Continues to require low flow oxygen.  Comfort is primary goal for patient and does not desire hospitalization. Chest XRay today shows ongoing signs of CHF.    Pneumonia of left lower lobe due to infectious organism (H)  Has completed antibiotics.  No fever and breathing improving.  Maintaining sats on 1.5-2 liters of oxygen.  CO2 level much improved    Chronic atrial fibrillation (H)  Heart rate continues with occasional slight elevation, but BP is low, so is likely at max beta blocker dose. Pulse ranges in past two days: .  On ASA for anticoagulation.    Mild cognitive impairment with memory loss  Has some cognitive loss and will confuse recent details, but is present in the moment.      ALLERGIES: Bactrim [sulfamethoxazole w-trimethoprim]; Gabapentin; Lyrica; Methadone; Nsaids; Penicillin g; Tramadol; Cephalexin hcl; Clindamycin hcl; and Macrobid [nitrofurantoin]  Past Medical, Surgical, Family and Social History reviewed and updated in [facility].    Current Outpatient Prescriptions   Medication Sig Dispense Refill     Acetaminophen (TYLENOL PO) Take 650 mg by mouth 4 times daily       Ascorbic Acid 1000 MG TABS Take 1,000 mg by mouth daily       aspirin 81 MG EC tablet Take 1 tablet (81 mg) by " mouth daily 90 tablet 3     chlorhexidine (PERIDEX) 0.12 % solution Swish and spit in mouth 2 times daily Rinse with 1/2 oz BID for 30 secs and expectorate       citalopram (CELEXA) 40 MG tablet TAKE 1 TABLET BY MOUTH DAILY 90 tablet 1     Cranberry (CRANBERRY CONCENTRATE) 500 MG CAPS Take 1 capsule (500 mg) by mouth 2 times daily 90 capsule      estradiol (ESTRACE VAGINAL) 0.1 MG/GM cream Place 2 g vaginally three times a week 42.5 g      furosemide (LASIX) 20 MG tablet 80 mg every morning 90 tablet 4     INSULIN ASPART SC Inject Subcutaneous 3 times daily (with meals) Inject per sliding scale:  200-249 = 2U, 250-299 = 4U, 300-349 = 6U, 350-400 = 8U, 401+ =  10U.       levalbuterol (XOPENEX) 0.63 MG/3ML neb solution Take 3 mLs (0.63 mg) by nebulization 2 times daily 360 mL      levalbuterol (XOPENEX) 0.63 MG/3ML neb solution Take 3 mLs (0.63 mg) by nebulization every 8 hours as needed for shortness of breath / dyspnea or wheezing And bid for two weeks. 360 mL      levothyroxine (SYNTHROID, LEVOTHROID) 125 MCG tablet Take 1 tablet (125 mcg) by mouth daily 90 tablet 1     LORazepam (ATIVAN) 0.5 MG tablet 0.25 mg in am and 0.5 mg at HS. 4 tablet 0     metFORMIN (GLUCOPHAGE) 500 MG tablet Take 1.5 tablets (750 mg) by mouth 2 times daily (with meals) 180 tablet 1     methenamine hippurate (HIPREX) 1 G TABS TAKE 1 TABLET BY MOUTH ONCE A DAY.  TAKE ALONG WITH 1000MG VITAMIN-C 180 tablet 4     metoprolol tartrate (LOPRESSOR) 25 MG tablet Take 1 tablet (25 mg) by mouth 2 times daily Hold for systolic BP <100 or pulse <60. 90 tablet 3     mirabegron (MYRBETRIQ) 50 MG 24 hr tablet Take 1 tablet (50 mg) by mouth daily 30 tablet 11     Multiple Vitamins-Minerals (PRESERVISION AREDS 2 PO) Take 1 tablet by mouth 2 times daily       OxyCODONE HCl (ROXICODONE PO) Take 5 mg by mouth 2 times daily Give at 0630 and 2100. And q6h prn       pantoprazole (PROTONIX) 40 MG enteric coated tablet Take 1 tablet (40 mg) by mouth daily Take  30-60 minutes before a meal. 180 tablet 3     polyethylene glycol (MIRALAX/GLYCOLAX) powder Take 17 g by mouth daily as needed for constipation 119 g      potassium chloride SA (K-DUR/KLOR-CON M) 10 MEQ CR tablet Take 2 tablets (20 mEq) by mouth daily 90 tablet 1     Pseudoephedrine-DM-GG (ROBITUSSIN CF PO) Take 5 mLs by mouth 4 times daily as needed        Selenium Sulfide 2.25 % SHAM Externally apply 1 Application topically twice a week       sodium chloride ([employer] SALINE NASAL SPRAY) 0.65 % nasal spray Spray 2 sprays into both nostrils 2 times daily       traZODone (DESYREL) 50 MG tablet Take 1 tablet (50 mg) by mouth At Bedtime 30 tablet 1     VITAMIN D, CHOLECALCIFEROL, PO Take 2,000 Units by mouth daily       Medications reviewed:  Medications reconciled to facility chart and changes were made to reflect current medications as identified as above med list. Below are the changes that were made:   Medications stopped since last EPIC medication reconciliation:   There are no discontinued medications.    Medications started since last [facility] medication reconciliation:  No orders of the defined types were placed in this encounter.    Patient Active Problem List   Diagnosis     GERD (gastroesophageal reflux disease)     Systolic murmur     CARDIOVASCULAR SCREENING; LDL GOAL LESS THAN 100     Osteoporosis     Peripheral neuropathy     Anxiety     Vitamin D deficiency     Mixed incontinence urge and stress     Health Care Home     Frequent UTI, Dr Bigg Muhammad, Urologist     Candidiasis of skin     Dependent edema     Benzodiazepine dependence, pt reports daily use of valium since the 1930's.     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     Chronic low back pain with bilateral sciatica     Macular degeneration (senile) of retina     Recurrent major depressive disorder, in partial remission (H)      "Seborrheic dermatitis     Morbid obesity (H)     BMI 35.0-35.9,adult     Leg skin lesion, left     Chronic atrial fibrillation (H)     Reactive airway disease without complication     Mild cognitive impairment with memory loss     JAMES (obstructive sleep apnea)     CHF (congestive heart failure), NYHA class III, unspecified failure chronicity, unspecified type (H)     JAYME (generalized anxiety disorder)     REVIEW OF SYSTEMS:  Negative selected, CONSTITUTIONAL: weight loss, w, poor appetite and fevers, Positive for 30 lbs weight gain in past 18 months.  Current weight is stable.. EYES: tearing, redness and Positive for eyeglasses and macular degeneration.  Followed by Dr. Carlson ENT: [place], dysphagia and positive for h/o epistaxis,  CV: chest pain and Positive for dependent edema and atrial fibrillation.., RESPIRATORY: , SEe HPI ., : Positive for frequent UTI's and dribble incontinence. Has implanted stimulator for bladder control.... GI: N&V.  Positive for occasional loose stools and constipation.  H/o occ rectal bleeding.. NEURO: headaches and Positive for variable memory issues - son reports it is dependent on how much valium and oxycodone she has taken & pt agrees and MUSCULOSKELETAL: Positive for chronic back pain and right hand pain .      Physical Exam:  /74  Pulse 106  Temp 98.2  F (36.8  C)  Resp 20  Ht 5' 6\" (1.676 m)  Wt 245 lb (111.1 kg)  BMI 39.54 kg/m2  GENERAL APPEARANCE: Alert, overweight female seen.  Resting in bed after lunch with HOB elevated.  Skin warm and dry.  Pink facial color.   Able to  express self verbally.  HEAD:  Normocephalic.  No facial asymmetry..  ENT: Mouth and posterior oropharynx normal, moist mucous membranes, .  EYES: EOM normal, conjunctiva and lids normal.   RESP:  Diminished lung sounds bilateral lower lobes.  No wheezing or rhonchi.  Old surgical scar from thyroidectomy visible at base of neck and upper chest.. Oxygen on at 1.5 l/min via NC.  No cough during " visit..  CV: Irregularly, irregular rate and rhythm, Rate is 102 at this time..  2/6 systolic murmur. +2/+3 ankle and pedal edema.  DP pulses +1 bilateral    ABDOMEN: Large, rounded abdomen. BS's positive all 4 quadrants. No discomfort with palpation of abdomen including suprapubic area.  MUSC:  NO pain with palpation of spinal process or with ROM of lower extremities and hips.   NEURO:  Alert and oriented X3.  Purposeful movement of extremities without focal weakness, although legs are weaker than arms due to deconditioning. No tremors or cogwheeling.  Strong hand grasp bilaterally.    Recent Labs:   CBC RESULTS:   Recent Labs   Lab Test 06/18/18 06/11/18 06/07/18 09/21/13   1455  06/01/11   1845   WBC   --   6.0  6.0  6.3   < >  10.9   --    RBC   --   4.22  4.22  4.35   < >  4.77   --    HGB  13.3*  12.3  12.3  12.5   < >  14.9  15.0   HCT   --   42.3  42.3  43.2   < >  43.1  49.2   MCV   --   100.2*  100.2*  99.3   < >  90  94.1   MCH   --    --    --    --   31.2  28.7   MCHC   --    --    --    --   34.6  30.5*   RDW   --   15.8*  15.8*  15.6*   < >  12.7  13.7   PLT   --   138*  138*  162   < >  128*   --     < > = values in this interval not displayed.       Last Basic Metabolic Panel:  6/25/18:  BUN: 17, creatinine: 0.73, ESt GFR>60, Na: 137, K; 4.6, Chl: 91, CO2: 33.  Recent Labs   Lab Test 06/18/18 06/14/18   NA  138  140   POTASSIUM  5.3*  4.5   CHLORIDE  91*  92*   KURTIS  10.3  9.9   CO2  35*  43*   BUN  42*  16   CR  1.15*  0.84   GLC  130*  108*     GFR Estimate   Date Value Ref Range Status   06/18/2018 42 (A) >60 mL/min/1.73m2 Final   06/14/2018 >60 >60 ml/min/1.73m2 Final   06/11/2018 57 (L) >60 mL/min/1.73m2 Final   06/11/2018 57 (L) >60 mL/min/1.73m2 Final   06/08/2018 >60 >60 ml/min/1.73m2 Final       TSH   Date Value Ref Range Status   02/05/2018 2.94 0.30 - 4.50 uIU/mL Final   02/02/2017 0.60 0.20 - 4.50 mcU/mL Final   02/02/2017 0.60 0.20 - 4.50 ulU/ml Final       Lab Results    Component Value Date    A1C 7.3 06/11/2018    A1C 6.9 02/05/2018     BNP:  6/25/18: 584             6/18/18:  652    Chest XRay:  6/25/18:  IMPRESSION:  Bilateral pulmonary edema/CHF  Increased opacification in the right mid lower lung and left lung base, possibly in part due to pleural fluid, atelectasis and/or airspace disease/pneumonia.    Assessment/Plan:  (I50.9) CHF (congestive heart failure), NYHA class III, unspecified failure chronicity, unspecified type (H)  (primary encounter diagnosis)  Comment: Ongoing  Plan: Increase lasix by adding 40 mg to noon dose. Give one dose of zaroxolyn tomorrow morning Continue to weigh three times per week and continue with use of low flow oxygen.  If she is not improved next week, need to consider hospice care. Recheck labs on Thursday.    (J18.1) Pneumonia of left lower lobe due to infectious organism (H)  Comment: Improved, but continues with abnormal chest xray due to CHF.  Plan: Continue current POC.    (I48.2) Chronic atrial fibrillation (H)  Comment: Chronic  Plan: Continue with beta blocker and ASA.  Continue to push diuretics and monitoring.    (G31.84) Mild cognitive impairment with memory loss  Comment: Ongoing  Plan: Continue to monitor.    Total time spent with patient visit at the skilled nursing facility was 36 min including patient visit and review of past records. Greater than 50% of total time spent with counseling and coordinating care due to CHF exacerbation    Electronically signed by  SABINE Borjas CNP

## 2018-07-02 NOTE — TELEPHONE ENCOUNTER
[place] [facility] TELEPHONE ENCOUNTER    Chief Complaint   Patient presents with     Abnormal Labs       Mireille Taylor is a 88 year old  (8/2/1929),Nurse called today to report: patient is weaker and more confused.  The Labs: K+ 3.2 and both the CO2 and chloride are elevated.  The NM has been trying to educate nursing staff about keeping her oxygen saturation between 88-92 and not have the O2  > 1L/NC.  She was given increased Furosemide last week and started on KCl supplement 10 mEq.      ASSESSMENT/PLAN  Hypokalemia  hypercapnea    Increase to KCl 20 mEq BID recheck K+ on 7/5    Reinforce and educate nursing staff the importance of keeping the O2 sat < 92%    Latricia Razo, SABINE CNP

## 2018-07-05 NOTE — PROGRESS NOTES
[facility]    Chief Complaint   Patient presents with     ARNOLD     [place] Medical Record Number:  [id number]    HPI:    Mireille Taylor is a 88 year old  (8/2/1929), who is being seen today for an episodic care visit at [facility].  HPI information obtained from: facility chart records, facility staff, patient report and [facility] chart review. Pt can be unreliable historian due to her variable cognition. Today's concern is:  Respiratory acidosis  Pt has had issues with elevated CO2 and low Chloride levels.  Often this will relate to high flow rates of oxygen and improve when rates are decreased.  Also has a metabolic component, due to use of diuretics for tx of her CHF.  Today's labs remain abnormal but improved from 7/2.  CO2 was critical at >45 and today is 41.  Chloride was 80 and today is 85.  Pt reports that she is feeling better and staff have observed this as well.    CHF (congestive heart failure), NYHA class III, unspecified failure chronicity, unspecified type (H)  Received Zaroxolyn on 6/26 in addition to her lasix dose of 80 mg in the morning and 40 mg at noon and has lost 13 lbs since 6/18.  Her lasix was held on 7/3 and 7/4 and restarted at 30 mg daily today due to her elevated CO2 and low chloride.  Breathing has improved and O2 flow rate is at 0.5 l/min.    Chronic atrial fibrillation (H)  Heart rate ranges in the past three days: 61-90.  BP ranges: 101-116/65-67.  No reports of chest pain.    Diabetes  Remains on metformin. Accuchecks in past four days: 0730: 115-141, 1700: 151-162.    Cognitive loss  Continues with episodes of confusion and delusional thoughts.  She called 911 last week when she thought she did not have her call light, when it was actually near her hand.  Today, she reports that she thought I told her that she could not stand on her feet, until BM specimens were collected.  Discussed that this was not ever a plan, but that due  to her weakness, she was needing to use the EZ stand for safety.      ALLERGIES: Bactrim [sulfamethoxazole w-trimethoprim]; Gabapentin; Lyrica; Methadone; Nsaids; Penicillin g; Tramadol; Cephalexin hcl; Clindamycin hcl; and Macrobid [nitrofurantoin]  Past Medical, Surgical, Family and Social History reviewed and updated in [facility].    Current Outpatient Prescriptions   Medication Sig Dispense Refill     Acetaminophen (TYLENOL PO) Take 650 mg by mouth 4 times daily       Ascorbic Acid 1000 MG TABS Take 1,000 mg by mouth daily       aspirin 81 MG EC tablet Take 1 tablet (81 mg) by mouth daily 90 tablet 3     chlorhexidine (PERIDEX) 0.12 % solution Swish and spit in mouth 2 times daily Rinse with 1/2 oz BID for 30 secs and expectorate       citalopram (CELEXA) 40 MG tablet TAKE 1 TABLET BY MOUTH DAILY 90 tablet 1     Cranberry (CRANBERRY CONCENTRATE) 500 MG CAPS Take 1 capsule (500 mg) by mouth 2 times daily 90 capsule      estradiol (ESTRACE VAGINAL) 0.1 MG/GM cream Place 2 g vaginally three times a week 42.5 g      levalbuterol (XOPENEX) 0.63 MG/3ML neb solution Take 3 mLs (0.63 mg) by nebulization 2 times daily 360 mL      levalbuterol (XOPENEX) 0.63 MG/3ML neb solution Take 3 mLs (0.63 mg) by nebulization every 8 hours as needed for shortness of breath / dyspnea or wheezing And bid for two weeks. 360 mL      levothyroxine (SYNTHROID, LEVOTHROID) 125 MCG tablet Take 1 tablet (125 mcg) by mouth daily 90 tablet 1     LORazepam (ATIVAN) 0.5 MG tablet 0.25 mg in am and 0.5 mg at HS. 4 tablet 0     metFORMIN (GLUCOPHAGE) 500 MG tablet Take 1.5 tablets (750 mg) by mouth 2 times daily (with meals) 180 tablet 1     methenamine hippurate (HIPREX) 1 G TABS TAKE 1 TABLET BY MOUTH ONCE A DAY.  TAKE ALONG WITH 1000MG VITAMIN-C 180 tablet 4     metoprolol tartrate (LOPRESSOR) 25 MG tablet Take 1 tablet (25 mg) by mouth 2 times daily Hold for systolic BP <100 or pulse <60. 90 tablet 3     mirabegron (MYRBETRIQ) 50 MG 24 hr tablet  Take 1 tablet (50 mg) by mouth daily 30 tablet 11     Multiple Vitamins-Minerals (PRESERVISION AREDS 2 PO) Take 1 tablet by mouth 2 times daily       OxyCODONE HCl (ROXICODONE PO) Take 5 mg by mouth 2 times daily Give at 0630 and 2100. And q6h prn       pantoprazole (PROTONIX) 40 MG enteric coated tablet Take 1 tablet (40 mg) by mouth daily Take 30-60 minutes before a meal. 180 tablet 3     polyethylene glycol (MIRALAX/GLYCOLAX) powder Take 17 g by mouth daily as needed for constipation 119 g      potassium chloride SA (K-DUR/KLOR-CON M) 10 MEQ CR tablet Take 2 tablets (20 mEq) by mouth daily 90 tablet 1     Pseudoephedrine-DM-GG (ROBITUSSIN CF PO) Take 5 mLs by mouth 4 times daily as needed        sodium chloride ([employer] SALINE NASAL SPRAY) 0.65 % nasal spray Spray 2 sprays into both nostrils 2 times daily       traZODone (DESYREL) 50 MG tablet Take 1 tablet (50 mg) by mouth At Bedtime 30 tablet 1     VITAMIN D, CHOLECALCIFEROL, PO Take 2,000 Units by mouth daily       Selenium Sulfide 2.25 % SHAM Externally apply 1 Application topically twice a week       Medications reviewed:  Medications reconciled to facility chart and changes were made to reflect current medications as identified as above med list. Below are the changes that were made:   Medications stopped since last EPIC medication reconciliation:   There are no discontinued medications.    Medications started since last [facility] medication reconciliation:  No orders of the defined types were placed in this encounter.    Patient Active Problem List   Diagnosis     GERD (gastroesophageal reflux disease)     Systolic murmur     CARDIOVASCULAR SCREENING; LDL GOAL LESS THAN 100     Osteoporosis     Peripheral neuropathy     Anxiety     Vitamin D deficiency     Mixed incontinence urge and stress     Health Care Home     Frequent UTI, Dr Bigg Muhammad, Urologist     Candidiasis of skin     Dependent edema     Benzodiazepine dependence, pt reports daily use of valium  "since the 1930's.     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     Chronic low back pain with bilateral sciatica     Macular degeneration (senile) of retina     Recurrent major depressive disorder, in partial remission (H)     Seborrheic dermatitis     Morbid obesity (H)     BMI 35.0-35.9,adult     Leg skin lesion, left     Chronic atrial fibrillation (H)     Reactive airway disease without complication     Mild cognitive impairment with memory loss     JAMES (obstructive sleep apnea)     CHF (congestive heart failure), NYHA class III, unspecified failure chronicity, unspecified type (H)     JAYME (generalized anxiety disorder)       REVIEW OF SYSTEMS:  Negative selected, CONSTITUTIONAL: weight loss, w, poor appetite and fevers, Positive for 30 lbs weight gain in past 18 months and now with diuresis has had 13 lb weight loss.  EYES: tearing, redness and Positive for eyeglasses and macular degeneration.  Followed by Dr. Carlson ENT: [place], dysphagia and positive for h/o epistaxis,  CV: chest pain and Positive for dependent edema and atrial fibrillation.., RESPIRATORY: , SEe HPI ., : Positive for frequent UTI's and dribble incontinence. Has implanted stimulator for bladder control.... GI: N&V.  Positive for occasional loose stools and constipation.  H/o occ rectal bleeding.. NEURO: headaches and Positive for variable memory issues - son reports it is dependent on how much valium and oxycodone she has taken & pt agrees and MUSCULOSKELETAL: Positive for chronic back pain and right hand pain .    Physical Exam:  /65  Pulse 61  Temp 96.3  F (35.7  C)  Resp 20  Ht 5' 6\" (1.676 m)  Wt 228 lb (103.4 kg)  BMI 36.8 kg/m2  GENERAL APPEARANCE: Alert, overweight female seen.  Resting in bed after lunch with HOB elevated.  Skin warm and dry.  Pink facial color.   Able to  express self verbally. Facial " appearance seems thinner.  HEAD:  Normocephalic.  No facial asymmetry..  ENT: Mouth and posterior oropharynx normal, moist mucous membranes, .  EYES: EOM normal, conjunctiva and lids normal.   RESP:  Diminished lung sounds bilateral lower lobes.  No wheezing or rhonchi.  Old surgical scar from thyroidectomy visible at base of neck and upper chest.. Oxygen on at 0.5 l/min via NC.  No cough during visit..  CV: Irregularly, irregular rate and rhythm, 2/6 systolic murmur. +2/+3 ankle and pedal edema.  DP pulses +1 bilateral    ABDOMEN: Large, rounded abdomen. BS's positive all 4 quadrants. No discomfort with palpation of abdomen including suprapubic area.  MUSC:  NO pain with palpation of spinal process or with ROM of lower extremities and hips.   NEURO:  Alert and oriented X3.  Purposeful movement of extremities without focal weakness, although legs are weaker than arms due to deconditioning. No tremors or cogwheeling.  Strong hand grasp bilaterally.  PSYCH:  Calm and positive mood.    Recent Labs:   CBC RESULTS:   Recent Labs   Lab Test 06/18/18 06/11/18 06/07/18 09/21/13   1455  06/01/11   1845   WBC   --   6.0  6.0  6.3   < >  10.9   --    RBC   --   4.22  4.22  4.35   < >  4.77   --    HGB  13.3*  12.3  12.3  12.5   < >  14.9  15.0   HCT   --   42.3  42.3  43.2   < >  43.1  49.2   MCV   --   100.2*  100.2*  99.3   < >  90  94.1   MCH   --    --    --    --   31.2  28.7   MCHC   --    --    --    --   34.6  30.5*   RDW   --   15.8*  15.8*  15.6*   < >  12.7  13.7   PLT   --   138*  138*  162   < >  128*   --     < > = values in this interval not displayed.       Last Basic Metabolic Panel:  7/5/18:  BUN: 20, creatinine: 0.68, Est GFR >60, Na: 138, K: 4.1, Chl: 85, CO2: 41  7/2/18:  BUN: 22, Creatinine: 0.74, est GFR >60,, Na: 140, K: 3.2, Chl: 80,  CO2:>45  Recent Labs   Lab Test 06/25/18 06/18/18   NA  137  138   POTASSIUM  4.6  5.3*   CHLORIDE  91*  91*   KURTIS  10.2  10.3   CO2  33*  35*   BUN  17  42*    CR  0.73  1.15*   GLC  167*  130*     GFR Estimate   Date Value Ref Range Status   07/05/2018 >60 >60 mL/min/1.73m2 Final   06/25/2018 >60 >60 ml/min/1.73m2 Final   06/18/2018 42 (A) >60 mL/min/1.73m2 Final   06/14/2018 >60 >60 ml/min/1.73m2 Final   06/11/2018 57 (L) >60 mL/min/1.73m2 Final   06/11/2018 57 (L) >60 mL/min/1.73m2 Final       TSH   Date Value Ref Range Status   02/05/2018 2.94 0.30 - 4.50 uIU/mL Final   02/02/2017 0.60 0.20 - 4.50 mcU/mL Final   02/02/2017 0.60 0.20 - 4.50 ulU/ml Final       Lab Results   Component Value Date    A1C 7.3 06/11/2018    A1C 6.9 02/05/2018     Family Communication:  Contacted son Raudel and gave him update on current status.  Will continue to monitor and update family.  If she should decline again, would consider referral to hospice for CHF.    Assessment/Plan:  (E87.2) Respiratory acidosis  (primary encounter diagnosis)  Comment: Improving slowly with lower O2 flow rate and less diuretic  Plan: Continue with low O2 flow rate and will repeat BMP on Monday.    (I50.9) CHF (congestive heart failure), NYHA class III, unspecified failure chronicity, unspecified type (H)  Comment: Improved  Plan: Lasix 60 mg daily and zaroxolyn for weights >235lbs.  Nsg to notify NP if she requires zaroxolyn.  Decrease KCL to 20 meq daily. Continue low flow rate oxygen with attempts to wean her off if she is able to maintain sats >90%.    (I48.2) Chronic atrial fibrillation (H)  Comment: Chronic, rate controlled with metoprolol  Plan: Continue current POC.  No coumadin for anticoagulation per pt request.    (E11.9) Type 2 diabetes mellitus without complication, without long-term current use of insulin (H)  Comment: Chronic, HgbA1C goal <8 %, at goal  Plan: Continue current POC.    (G31.84) Mild cognitive impairment with memory loss  Comment: Chronic  Plan: Continue to monitor for delusional thoughts.  If they continue to provide stress to her or fear, will need to consider low dose  antipsychotic med.    Total time spent with patient visit at the skilled nursing facility was 38 min including patient visit, review of past records and phone call to patient contact. Greater than 50% of total time spent with counseling and coordinating care due to CHF and respiratory acidosis    Electronically signed by  SABINE Borjas CNP

## 2018-07-09 NOTE — PROGRESS NOTES
[facility]    Chief Complaint   Patient presents with     ARNOLD     [place] Medical Record Number:  [id number]    HPI:    Mireille Taylor is a 88 year old  (8/2/1929), who is being seen today for an episodic care visit at [facility].  HPI information obtained from: facility chart records, facility staff, patient report and [facility] chart review.Today's concern is:  Respiratory acidosis  Pt continues to improve.  O2 flow rate now down to 1l/min. Pt appears to be mentally clearer and getting stronger.    CHF (congestive heart failure), NYHA class III, unspecified failure chronicity, unspecified type (H)  Weight improved significantly following one dose of zaroxolyn and a weight loss of 18 lbs.  Now on maintenance dose of lasix with prn zaroxolyn orders for 2.5 mg if weight reached 235 lbs.  Weight is now 231.    Mild cognitive impairment with memory loss  Continues with delusional thoughts and memory impairment, but improved from two weeks ago.    Type 2 diabetes mellitus without complication, without long-term current use of insulin (H)  Remains on metformin.  Accuchecks in past three days: 0730: 118-138, 5pm:  .   Appetite improving.     ALLERGIES: Bactrim [sulfamethoxazole w-trimethoprim]; Gabapentin; Lyrica; Methadone; Nsaids; Penicillin g; Tramadol; Cephalexin hcl; Clindamycin hcl; and Macrobid [nitrofurantoin]  Past Medical, Surgical, Family and Social History reviewed and updated in [facility].    Current Outpatient Prescriptions   Medication Sig Dispense Refill     Acetaminophen (TYLENOL PO) Take 650 mg by mouth 4 times daily       Ascorbic Acid 1000 MG TABS Take 1,000 mg by mouth daily       aspirin 81 MG EC tablet Take 1 tablet (81 mg) by mouth daily 90 tablet 3     chlorhexidine (PERIDEX) 0.12 % solution Swish and spit in mouth 2 times daily Rinse with 1/2 oz BID for 30 secs and expectorate       citalopram (CELEXA) 40 MG tablet TAKE 1 TABLET BY MOUTH  DAILY 90 tablet 1     Cranberry (CRANBERRY CONCENTRATE) 500 MG CAPS Take 1 capsule (500 mg) by mouth 2 times daily 90 capsule      estradiol (ESTRACE VAGINAL) 0.1 MG/GM cream Place 2 g vaginally three times a week 42.5 g      furosemide (LASIX) 20 MG tablet Take 3 tablets (60 mg) by mouth daily 60 tablet 1     levalbuterol (XOPENEX) 0.63 MG/3ML neb solution Take 3 mLs (0.63 mg) by nebulization 2 times daily 360 mL      levalbuterol (XOPENEX) 0.63 MG/3ML neb solution Take 3 mLs (0.63 mg) by nebulization every 8 hours as needed for shortness of breath / dyspnea or wheezing And bid for two weeks. 360 mL      levothyroxine (SYNTHROID, LEVOTHROID) 125 MCG tablet Take 1 tablet (125 mcg) by mouth daily 90 tablet 1     LORazepam (ATIVAN) 0.5 MG tablet 0.25 mg in am and 0.5 mg at HS. 4 tablet 0     metFORMIN (GLUCOPHAGE) 500 MG tablet Take 1.5 tablets (750 mg) by mouth 2 times daily (with meals) 180 tablet 1     methenamine hippurate (HIPREX) 1 G TABS TAKE 1 TABLET BY MOUTH ONCE A DAY.  TAKE ALONG WITH 1000MG VITAMIN-C 180 tablet 4     MetOLazone (ZAROXOLYN PO) Take 2.5 mg by mouth Give 1/2 hour before lasix if weight is >235 lbs (M-W-F)       metoprolol tartrate (LOPRESSOR) 25 MG tablet Take 1 tablet (25 mg) by mouth 2 times daily Hold for systolic BP <100 or pulse <60. 90 tablet 3     mirabegron (MYRBETRIQ) 50 MG 24 hr tablet Take 1 tablet (50 mg) by mouth daily 30 tablet 11     Multiple Vitamins-Minerals (PRESERVISION AREDS 2 PO) Take 1 tablet by mouth 2 times daily       OxyCODONE HCl (ROXICODONE PO) Take 5 mg by mouth 2 times daily Give at 0630 and 2100. And q6h prn       pantoprazole (PROTONIX) 40 MG enteric coated tablet Take 1 tablet (40 mg) by mouth daily Take 30-60 minutes before a meal. 180 tablet 3     polyethylene glycol (MIRALAX/GLYCOLAX) powder Take 17 g by mouth daily as needed for constipation 119 g      potassium chloride SA (K-DUR/KLOR-CON M) 10 MEQ CR tablet Take 2 tablets (20 mEq) by mouth daily 90  tablet 1     Pseudoephedrine-DM-GG (ROBITUSSIN CF PO) Take 5 mLs by mouth 4 times daily as needed        Selenium Sulfide 2.25 % SHAM Externally apply 1 Application topically twice a week       sodium chloride ([employer]S SALINE NASAL SPRAY) 0.65 % nasal spray Spray 2 sprays into both nostrils 2 times daily       traZODone (DESYREL) 50 MG tablet Take 1 tablet (50 mg) by mouth At Bedtime 30 tablet 1     VITAMIN D, CHOLECALCIFEROL, PO Take 2,000 Units by mouth daily       Medications reviewed:  Medications reconciled to facility chart and changes were made to reflect current medications as identified as above med list. Below are the changes that were made:   Medications stopped since last EPIC medication reconciliation:   There are no discontinued medications.    Medications started since last [facility] medication reconciliation:  No orders of the defined types were placed in this encounter.    Patient Active Problem List   Diagnosis     GERD (gastroesophageal reflux disease)     Systolic murmur     CARDIOVASCULAR SCREENING; LDL GOAL LESS THAN 100     Osteoporosis     Peripheral neuropathy     Anxiety     Vitamin D deficiency     Mixed incontinence urge and stress     Health Care Home     Frequent UTI, Dr Bigg Muhammad, Urologist     Candidiasis of skin     Dependent edema     Benzodiazepine dependence, pt reports daily use of valium since the 1930's.     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     Chronic low back pain with bilateral sciatica     Macular degeneration (senile) of retina     Recurrent major depressive disorder, in partial remission (H)     Seborrheic dermatitis     Morbid obesity (H)     BMI 35.0-35.9,adult     Leg skin lesion, left     Chronic atrial fibrillation (H)     Reactive airway disease without complication     Mild cognitive impairment with memory loss     JAMES (obstructive sleep apnea)      "CHF (congestive heart failure), NYHA class III, unspecified failure chronicity, unspecified type (H)     JAYME (generalized anxiety disorder)       REVIEW OF SYSTEMS:  Negative selected, CONSTITUTIONAL: weight loss, w, poor appetite and fevers, Positive for 30 lbs weight gain in past 18 months and now with diuresis has had significant weight loss.  EYES: tearing, redness and Positive for eyeglasses and macular degeneration.  Followed by Dr. Carlson ENT: [place], dysphagia and positive for h/o epistaxis,  CV: chest pain and Positive for dependent edema and atrial fibrillation.., RESPIRATORY: , SEe HPI ., : Positive for frequent UTI's and dribble incontinence. Has implanted stimulator for bladder control.... GI: N&V.  Positive for occasional loose stools and constipation.  H/o occ rectal bleeding.. NEURO: headaches and Positive for variable memory issues - son reports it is dependent on how much valium and oxycodone she has taken & pt agrees and MUSCULOSKELETAL: Positive for chronic back pain and right hand pain .      Physical Exam:  BP (P) 113/70  Pulse (P) 76  Temp (P) 97.1  F (36.2  C)  Resp (P) 17  Ht 5' 6\" (1.676 m)  Wt (P) 231 lb (104.8 kg)  BMI (P) 37.28 kg/m2  GENERAL APPEARANCE: Alert, overweight female seen. Up in w/c before breakfast. Pt reports that she is feeling. Skin warm and dry.  Pink facial color.   Able to  express self verbally. Facial appearance seems thinner.  HEAD:  Normocephalic.  No facial asymmetry..  ENT: Mouth and posterior oropharynx normal, moist mucous membranes, .  EYES: EOM normal, conjunctiva and lids normal.   RESP:  Diminished lung sounds bilateral lower lobes.  No wheezing or rhonchi.  Old surgical scar from thyroidectomy visible at base of neck and upper chest.. Oxygen on at 0.5 l/min via NC.  No cough during visit..  CV: Irregularly, irregular rate and rhythm, 2/6 systolic murmur. +2/+3 ankle and pedal edema.  DP pulses +1 bilateral    ABDOMEN: Large, rounded abdomen. BS's " positive all 4 quadrants. No discomfort with palpation of abdomen including suprapubic area.  MUSC:  NO pain with palpation of spinal process or with ROM of lower extremities and hips.   NEURO:  Alert and oriented X3.  Purposeful movement of extremities without focal weakness, although legs are weaker than arms due to deconditioning. No tremors or cogwheeling.  Strong hand grasp bilaterally.  PSYCH:  Calm and positive mood.      Recent Labs:   CBC RESULTS:   Recent Labs   Lab Test 06/18/18 06/11/18 06/07/18 09/21/13   1455  06/01/11   1845   WBC   --   6.0  6.0  6.3   < >  10.9   --    RBC   --   4.22  4.22  4.35   < >  4.77   --    HGB  13.3*  12.3  12.3  12.5   < >  14.9  15.0   HCT   --   42.3  42.3  43.2   < >  43.1  49.2   MCV   --   100.2*  100.2*  99.3   < >  90  94.1   MCH   --    --    --    --   31.2  28.7   MCHC   --    --    --    --   34.6  30.5*   RDW   --   15.8*  15.8*  15.6*   < >  12.7  13.7   PLT   --   138*  138*  162   < >  128*   --     < > = values in this interval not displayed.       Last Basic Metabolic Panel:  Recent Labs   Lab Test 07/05/18 07/02/18   NA  138  140   POTASSIUM  4.1  3.2*   CHLORIDE  85*  80*   KURTIS  10.3  10.2   CO2  41*  >45   BUN  20  22   CR  0.68  0.74   GLC  143*  210*     GFR Estimate   Date Value Ref Range Status   07/05/2018 >60 >60 mL/min/1.73m2 Final   07/02/2018 >60 >60 mL/min/1.73m2 Final   06/25/2018 >60 >60 ml/min/1.73m2 Final   06/18/2018 42 (A) >60 mL/min/1.73m2 Final   06/14/2018 >60 >60 ml/min/1.73m2 Final       TSH   Date Value Ref Range Status   02/05/2018 2.94 0.30 - 4.50 uIU/mL Final   02/02/2017 0.60 0.20 - 4.50 mcU/mL Final   02/02/2017 0.60 0.20 - 4.50 ulU/ml Final       Lab Results   Component Value Date    A1C 7.3 06/11/2018    A1C 6.9 02/05/2018     Assessment/Plan:  (E87.2) Respiratory acidosis  (primary encounter diagnosis)  Comment: Improving  Plan: BMP pending today.  Continue low flow oxygen.  Monitor carefully.    (I50.9) CHF  (congestive heart failure), NYHA class III, unspecified failure chronicity, unspecified type (H)  Comment: Improving  Plan: Continue with current dose of lasix and KCL.  Adjust as indicated by health status and labs.  BNP pending today.    (G31.84) Mild cognitive impairment with memory loss  Comment: Chronic, but slightly improved due to improved health status  Plan: Continue POC.    (E11.9) Type 2 diabetes mellitus without complication, without long-term current use of insulin (H)  Comment: Chronic, but stable  Plan: Continue current POC.     Total time spent with patient visit at the skilled nursing facility was 38min including patient visit and review of past records. Greater than 50% of total time spent with counseling and coordinating care due to respiratory acidosis and CHF    Electronically signed by  SABINE Borjas CNP

## 2018-07-17 NOTE — PROGRESS NOTES
[facility]    Chief Complaint   Patient presents with     ARNOLD     [facility] Medical Record Number:  [id number]    HPI:    Mireille Taylor is a 88 year old  (8/2/1929), who is being seen today for an episodic care visit at [facility].  HPI information obtained from: facility chart records, facility staff, patient report, [facility] chart review and [facility] Everywhere [facility] chart review.Today's concern is:  Respiratory acidosis  Pt remains on 1l/min of oxygen via NC. Her CO2 improved to 35 and Chloride improved to 94. Pt is more alert and reports that she is feeling better.    Type 2 diabetes mellitus without complication, without long-term current use of insulin (H)  accuchecks in the past week: 0730: 154-176, 1700: 109-229.    CHF (congestive heart failure), NYHA class III, unspecified failure chronicity, unspecified type (H)  Weight has increased 4 lbs in past week.  No reports of increase in SOB and has usual LE edema.  Pt has order that if weight tomorrow is >235lbs she will receive Zaroxolyn. Remains on daily lasix.    Chronic atrial fibrillation (H)  Heart rate is now usually below 100 with rare reports of heart rates between 100-110.  Pt denies chest pain and continues on metoprolol.    Mild cognitive impairment with memory loss  Ongoing cognitive impairment and is unreliable historian, but overall is clearer than one week ago.  Physical Therapy is working with patients on transfers, now that she is more able to participate with cognitive improvement.  Pt continues with delusional thoughts and will accuse staff of actions that did not occur.  This has been done in the presence of her family, who reminded her that the staff member did not do what she had just accused them of doing.      ALLERGIES: Bactrim [sulfamethoxazole w-trimethoprim]; Gabapentin; Lyrica; Methadone; Nsaids; Penicillin g; Tramadol; Cephalexin hcl; Clindamycin hcl; and Macrobid  [nitrofurantoin]  Past Medical, Surgical, Family and Social History reviewed and updated in [facility].    Current Outpatient Prescriptions   Medication Sig Dispense Refill     Acetaminophen (TYLENOL PO) Take 650 mg by mouth 4 times daily       Ascorbic Acid 1000 MG TABS Take 1,000 mg by mouth daily       aspirin 81 MG EC tablet Take 1 tablet (81 mg) by mouth daily 90 tablet 3     chlorhexidine (PERIDEX) 0.12 % solution Swish and spit in mouth 2 times daily Rinse with 1/2 oz BID for 30 secs and expectorate       citalopram (CELEXA) 40 MG tablet TAKE 1 TABLET BY MOUTH DAILY 90 tablet 1     Cranberry (CRANBERRY CONCENTRATE) 500 MG CAPS Take 1 capsule (500 mg) by mouth 2 times daily 90 capsule      estradiol (ESTRACE VAGINAL) 0.1 MG/GM cream Place 2 g vaginally three times a week 42.5 g      furosemide (LASIX) 20 MG tablet Take 3 tablets (60 mg) by mouth daily 60 tablet 1     levalbuterol (XOPENEX) 0.63 MG/3ML neb solution Take 3 mLs (0.63 mg) by nebulization 2 times daily 360 mL      levalbuterol (XOPENEX) 0.63 MG/3ML neb solution Take 3 mLs (0.63 mg) by nebulization every 8 hours as needed for shortness of breath / dyspnea or wheezing And bid for two weeks. 360 mL      levothyroxine (SYNTHROID, LEVOTHROID) 125 MCG tablet Take 1 tablet (125 mcg) by mouth daily 90 tablet 1     LORazepam (ATIVAN) 0.5 MG tablet 0.25 mg in am and 0.5 mg at HS. 4 tablet 0     metFORMIN (GLUCOPHAGE) 500 MG tablet Take 1.5 tablets (750 mg) by mouth 2 times daily (with meals) 180 tablet 1     methenamine hippurate (HIPREX) 1 G TABS TAKE 1 TABLET BY MOUTH ONCE A DAY.  TAKE ALONG WITH 1000MG VITAMIN-C 180 tablet 4     MetOLazone (ZAROXOLYN PO) Take 2.5 mg by mouth Give 1/2 hour before lasix if weight is >235 lbs (M-W-F)       metoprolol tartrate (LOPRESSOR) 25 MG tablet Take 1 tablet (25 mg) by mouth 2 times daily Hold for systolic BP <100 or pulse <60. 90 tablet 3     mirabegron (MYRBETRIQ) 50 MG 24 hr tablet Take 1 tablet (50 mg) by mouth daily 30  tablet 11     Multiple Vitamins-Minerals (PRESERVISION AREDS 2 PO) Take 1 tablet by mouth 2 times daily       OxyCODONE HCl (ROXICODONE PO) Take 5 mg by mouth 2 times daily Give at 0630 and 2100. And q6h prn       pantoprazole (PROTONIX) 40 MG enteric coated tablet Take 1 tablet (40 mg) by mouth daily Take 30-60 minutes before a meal. 180 tablet 3     polyethylene glycol (MIRALAX/GLYCOLAX) powder Take 17 g by mouth daily as needed for constipation 119 g      potassium chloride SA (K-DUR/KLOR-CON M) 10 MEQ CR tablet Take 2 tablets (20 mEq) by mouth daily 90 tablet 1     Pseudoephedrine-DM-GG (ROBITUSSIN CF PO) Take 5 mLs by mouth 4 times daily as needed        Selenium Sulfide 2.25 % SHAM Externally apply 1 Application topically twice a week       sodium chloride ([employer] SALINE NASAL SPRAY) 0.65 % nasal spray Spray 2 sprays into both nostrils 2 times daily       traZODone (DESYREL) 50 MG tablet Take 1 tablet (50 mg) by mouth At Bedtime 30 tablet 1     VITAMIN D, CHOLECALCIFEROL, PO Take 2,000 Units by mouth daily       Medications reviewed:  Medications reconciled to facility chart and changes were made to reflect current medications as identified as above med list. Below are the changes that were made:   Medications stopped since last EPIC medication reconciliation:   There are no discontinued medications.    Medications started since last [facility] medication reconciliation:  No orders of the defined types were placed in this encounter.    Patient Active Problem List   Diagnosis     GERD (gastroesophageal reflux disease)     Systolic murmur     CARDIOVASCULAR SCREENING; LDL GOAL LESS THAN 100     Osteoporosis     Peripheral neuropathy     Anxiety     Vitamin D deficiency     Mixed incontinence urge and stress     Health Care Home     Frequent UTI, Dr Bigg Muhammad, Urologist     Candidiasis of skin     Dependent edema     Benzodiazepine dependence, pt reports daily use of valium since the 1930's.     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     Chronic low back pain with bilateral sciatica     Macular degeneration (senile) of retina     Recurrent major depressive disorder, in partial remission (H)     Seborrheic dermatitis     Morbid obesity (H)     BMI 35.0-35.9,adult     Leg skin lesion, left     Chronic atrial fibrillation (H)     Reactive airway disease without complication     Mild cognitive impairment with memory loss     JAMES (obstructive sleep apnea)     CHF (congestive heart failure), NYHA class III, unspecified failure chronicity, unspecified type (H)     JAYME (generalized anxiety disorder)       REVIEW OF SYSTEMS:  Limited secondary to cognitive impairment but today pt reports that she is overall feeling better, denies chest pain, abdominal pain or increase in joint and back pain.  Is concerned about having a BM.  Feels like she will have one soon, but is worried about it.  No BM recorded since 7/14.    Physical Exam:  /71  Pulse 104  Temp 97.6  F (36.4  C)  Resp 20  Wt 235 lb (106.6 kg)  SpO2 90%  BMI 37.93 kg/m2  GENERAL APPEARANCE: Alert, overweight female seen. Resting in bed visiting with son Andre and his family who are here from [facility].  Skin warm and dry.  Pink facial color.   Able to  express self verbally. Talkative and no obvious acute distress..  HEAD:  Normocephalic.  No facial asymmetry..  ENT: Mouth and posterior oropharynx normal, moist mucous membranes, .  EYES: EOM normal, conjunctiva and lids normal.   RESP:  Diminished lung sounds bilateral lower lobes.  No wheezing or rhonchi.  Old surgical scar from thyroidectomy visible at base of neck and upper chest.. Oxygen on at 1 l/min via NC.  No cough during visit..  CV: Irregularly, irregular rate and rhythm, 2/6 systolic murmur. +2/+3 ankle and pedal edema.  DP pulses +1 bilateral    ABDOMEN: Large, rounded abdomen. BS's positive all  4 quadrants. No discomfort with palpation of abdomen including suprapubic area. Audible BS's.    NEURO:  Alert and oriented X3.  Purposeful movement of extremities without focal weakness, although legs are weaker than arms due to deconditioning. No tremors or cogwheeling.  Strong hand grasp bilaterally.  PSYCH:  Calm and positive mood.     Recent Labs:     CBC RESULTS:   Recent Labs   Lab Test 07/10/18 06/18/18 06/11/18   09/21/13   1455  06/01/11   1845   WBC  5.6   --   6.0  6.0   < >  10.9   --    RBC  4.62   --   4.22  4.22   < >  4.77   --    HGB  12.9  13.3*  12.3  12.3   < >  14.9  15.0   HCT  43.7   --   42.3  42.3   < >  43.1  49.2   MCV  94.6   --   100.2*  100.2*   < >  90  94.1   MCH   --    --    --    --   31.2  28.7   MCHC   --    --    --    --   34.6  30.5*   RDW  15.9*   --   15.8*  15.8*   < >  12.7  13.7   PLT  202   --   138*  138*   < >  128*   --     < > = values in this interval not displayed.       Last Basic Metabolic Panel:  Recent Labs   Lab Test 07/16/18 07/10/18   NA  137  141   POTASSIUM  4.6  4.2   CHLORIDE  94*  91*   KURTIS  10.2  10.5*   CO2  35*  40*   BUN  17  20   CR  0.68  0.76   GLC  128*  159*       Lab Results   Component Value Date    A1C 7.3 06/11/2018    A1C 6.9 02/05/2018     GFR Estimate   Date Value Ref Range Status   07/16/2018 >60 >60 mL/min/1.73m2 Final   07/10/2018 >60 >60 mL/min/1.73m2 Final   07/05/2018 >60 >60 mL/min/1.73m2 Final   07/02/2018 >60 >60 mL/min/1.73m2 Final   06/25/2018 >60 >60 ml/min/1.73m2 Final     Family Communication: Discussed status of patient with son in her presence.  He expressed understanding of status.    Assessment/Plan:  (E87.2) Respiratory acidosis  (primary encounter diagnosis)  Comment: Resolved  Plan: Continue with low flow oxygen.  Pt to utilized incentive spirometry bid. Repeat BMP next week.    (E11.9) Type 2 diabetes mellitus without complication, without long-term current use of insulin (H)  Comment: Chronic, but  controlled. HgbA1C goal <8%, at goal  Plan: Continue current POC.  Recheck HgbA1C in September.    (I50.9) CHF (congestive heart failure), NYHA class III, unspecified failure chronicity, unspecified type (H)  Comment: Chronic  Plan: Weight tomorrow and to receive Zaroxolyn if weight >235 lbs.  Continue lasix.  Recheck BMP on 7/23.    (I48.2) Chronic atrial fibrillation (H)  Comment: Chronic, rate improved on metoprolol  Plan: Continue current POC.  Pt is not a coumadin candidate due to risks of bleeding and she has declined consideration of this medication.  Continue aspirin.    (G31.84) Mild cognitive impairment with memory loss  Comment: Ongoing  Plan: Continue with reminders of current status and monitor for increased delusional thoughts.  If these occur, many need to consider antipsychotic.    Electronically signed by  SABINE Borjas CNP

## 2018-07-30 NOTE — PROGRESS NOTES
[facility]    Chief Complaint   Patient presents with     GREGORYROSHAN     [facility] Medical Record Number:  [id number]    HPI:    Mireille Taylor is a 88 year old  (8/2/1929), who is being seen today for an episodic care visit at [facility].  HPI information obtained from: facility chart records, facility staff, patient report and [facility] chart review. Pt is unreliable historian due to cognitive loss. Today's concern is:  Chronic atrial fibrillation (H)  On current dose of betablocker, pt pulse in last 2 weeks has ranged from 69-83.  No reports of heart rates >100/min and remains on ASA for anticoagulation. Pt declined warfarin.    Respiratory acidosis  Much improved on low flow oxygen.  Was able to be off oxygen for a period of time yesterday.  Continues with incentive spirometry bid.  Chloride level is only slightly low at 95 and CO2 level slightly high at 35.  Much improved from critical level of CO2 at 45.     CHF (congestive heart failure), NYHA class III, unspecified failure chronicity, unspecified type (H)  Weight is stable in the mid 230's compared to mid 240's in mid June.  Continues with LE edema, but this is at baseline.    Moderate persistent reactive airway disease without complication  Continues on bid xopenex.  No reports of increase in breathing issues.  Pt is spending more time up and out of her bed, although she would prefer to be in bed.  Is working with Physical Therapy on transfers.    Diabetes type 2  Remains on metformin.  Accuchecks this month: 0730: 136-149.  HgbA1C was 7.3% in June.    Mild cognitive impairment with memory loss  Notable fluctuations in confusion consistent with her high CO2 levels at times.  Also has had a slow cognitive decline.  This is accompanied by paranoid delusions with accusations of maltreatment by other patients and staff that are not based in reality.    ALLERGIES: Bactrim [sulfamethoxazole w-trimethoprim];  Gabapentin; Lyrica; Methadone; Nsaids; Penicillin g; Tramadol; Cephalexin hcl; Clindamycin hcl; and Macrobid [nitrofurantoin]  Past Medical, Surgical, Family and Social History reviewed and updated in EPIC.    Current Outpatient Prescriptions   Medication Sig Dispense Refill     Acetaminophen (TYLENOL PO) Take 650 mg by mouth 4 times daily       Ascorbic Acid 1000 MG TABS Take 1,000 mg by mouth daily       aspirin 81 MG EC tablet Take 1 tablet (81 mg) by mouth daily 90 tablet 3     bisacodyl (DULCOLAX) 10 MG Suppository Every other day if no BM 25 suppository 1     chlorhexidine (PERIDEX) 0.12 % solution Swish and spit in mouth 2 times daily Rinse with 1/2 oz BID for 30 secs and expectorate       citalopram (CELEXA) 40 MG tablet TAKE 1 TABLET BY MOUTH DAILY 90 tablet 1     Cranberry (CRANBERRY CONCENTRATE) 500 MG CAPS Take 1 capsule (500 mg) by mouth 2 times daily 90 capsule      estradiol (ESTRACE VAGINAL) 0.1 MG/GM cream Place 2 g vaginally three times a week 42.5 g      furosemide (LASIX) 20 MG tablet Take 3 tablets (60 mg) by mouth daily 60 tablet 1     levalbuterol (XOPENEX) 0.63 MG/3ML neb solution Take 3 mLs (0.63 mg) by nebulization 2 times daily 360 mL      levalbuterol (XOPENEX) 0.63 MG/3ML neb solution Take 3 mLs (0.63 mg) by nebulization every 8 hours as needed for shortness of breath / dyspnea or wheezing And bid for two weeks. 360 mL      levothyroxine (SYNTHROID, LEVOTHROID) 125 MCG tablet Take 1 tablet (125 mcg) by mouth daily 90 tablet 1     LORazepam (ATIVAN) 0.5 MG tablet 0.25 mg in am and 0.5 mg at HS. 4 tablet 0     metFORMIN (GLUCOPHAGE) 500 MG tablet Take 1.5 tablets (750 mg) by mouth 2 times daily (with meals) 180 tablet 1     methenamine hippurate (HIPREX) 1 G TABS TAKE 1 TABLET BY MOUTH ONCE A DAY.  TAKE ALONG WITH 1000MG VITAMIN-C 180 tablet 4     MetOLazone (ZAROXOLYN PO) Take 2.5 mg by mouth Give 1/2 hour before lasix if weight is >235 lbs (M-W-F)       metoprolol tartrate (LOPRESSOR) 25  MG tablet Take 1 tablet (25 mg) by mouth 2 times daily Hold for systolic BP <100 or pulse <60. 90 tablet 3     mirabegron (MYRBETRIQ) 50 MG 24 hr tablet Take 1 tablet (50 mg) by mouth daily 30 tablet 11     Multiple Vitamins-Minerals (PRESERVISION AREDS 2 PO) Take 1 tablet by mouth 2 times daily       OxyCODONE HCl (ROXICODONE PO) Take 5 mg by mouth 2 times daily Give at 0630 and 2100. And q6h prn       pantoprazole (PROTONIX) 40 MG enteric coated tablet Take 1 tablet (40 mg) by mouth daily Take 30-60 minutes before a meal. 180 tablet 3     polyethylene glycol (MIRALAX/GLYCOLAX) powder Take 17 g by mouth daily as needed for constipation 119 g      potassium chloride SA (K-DUR/KLOR-CON M) 10 MEQ CR tablet Take 2 tablets (20 mEq) by mouth daily 90 tablet 1     Pseudoephedrine-DM-GG (ROBITUSSIN CF PO) Take 5 mLs by mouth 4 times daily as needed        Selenium Sulfide 2.25 % SHAM Externally apply 1 Application topically twice a week       sodium chloride ([employer]S SALINE NASAL SPRAY) 0.65 % nasal spray Spray 2 sprays into both nostrils 2 times daily       traZODone (DESYREL) 50 MG tablet Take 1 tablet (50 mg) by mouth At Bedtime 30 tablet 1     VITAMIN D, CHOLECALCIFEROL, PO Take 2,000 Units by mouth daily       Medications reviewed:  Medications reconciled to facility chart and changes were made to reflect current medications as identified as above med list. Below are the changes that were made:   Medications stopped since last EPIC medication reconciliation:   There are no discontinued medications.    Medications started since last [facility] medication reconciliation:  No orders of the defined types were placed in this encounter.    Patient Active Problem List   Diagnosis     GERD (gastroesophageal reflux disease)     Systolic murmur     CARDIOVASCULAR SCREENING; LDL GOAL LESS THAN 100     Osteoporosis     Peripheral neuropathy     Anxiety     Vitamin D deficiency     Mixed incontinence urge and stress     Health Care Home      "Frequent UTI, Dr Bigg Muhammad, Urologist     Candidiasis of skin     Dependent edema     Benzodiazepine dependence, pt reports daily use of valium since the 1930's.     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     Chronic low back pain with bilateral sciatica     Macular degeneration (senile) of retina     Recurrent major depressive disorder, in partial remission (H)     Seborrheic dermatitis     Morbid obesity (H)     BMI 35.0-35.9,adult     Leg skin lesion, left     Chronic atrial fibrillation (H)     Reactive airway disease without complication     Mild cognitive impairment with memory loss     JAMES (obstructive sleep apnea)     CHF (congestive heart failure), NYHA class III, unspecified failure chronicity, unspecified type (H)     JAYME (generalized anxiety disorder)       REVIEW OF SYSTEMS:  Limited secondary to cognitive impairment but today pt reports that she is overall feeling better, denies chest pain, abdominal pain or increase in joint and back pain.     Physical Exam:  /82  Pulse 80  Temp 97.2  F (36.2  C)  Resp 20  Ht 5' 6\" (1.676 m)  Wt 235 lb (106.6 kg)  BMI 37.93 kg/m2  GENERAL APPEARANCE: Alert, overweight female seen. Up in w/c.  Skin warm and dry.  Pink facial color.   Able to  express self verbally. Talkative and no obvious acute distress..  HEAD:  Normocephalic.  No facial asymmetry..  ENT: Mouth and posterior oropharynx normal, moist mucous membranes, .  EYES: EOM normal, conjunctiva and lids normal.   RESP:  Diminished lung sounds bilateral lower lobes.  No wheezing or rhonchi.  Old surgical scar from thyroidectomy visible at base of neck and upper chest.. Oxygen on at 1 l/min via NC.  No cough during visit..  CV: Irregularly, irregular rate and rhythm, 2/6 systolic murmur. +2/+3 ankle and pedal edema.      ABDOMEN: Large, rounded abdomen. BS's positive all 4 " quadrants. No discomfort with palpation of abdomen including suprapubic area. Audible BS's.    NEURO:  Alert.  Purposeful movement of extremities without focal weakness, although legs are weaker than arms due to deconditioning. No tremors.  PSYCH:  Calm and positive mood.     Recent Labs:   CBC RESULTS:   Recent Labs   Lab Test 07/23/18 07/10/18  06/11/18   09/21/13   1455  06/01/11   1845   WBC   --   5.6   --   6.0  6.0   < >  10.9   --    RBC   --   4.62   --   4.22  4.22   < >  4.77   --    HGB  13.3  12.9   < >  12.3  12.3   < >  14.9  15.0   HCT   --   43.7   --   42.3  42.3   < >  43.1  49.2   MCV   --   94.6   --   100.2*  100.2*   < >  90  94.1   MCH   --    --    --    --    --   31.2  28.7   MCHC   --    --    --    --    --   34.6  30.5*   RDW   --   15.9*   --   15.8*  15.8*   < >  12.7  13.7   PLT   --   202   --   138*  138*   < >  128*   --     < > = values in this interval not displayed.       Last Basic Metabolic Panel:  Recent Labs   Lab Test 07/23/18 07/16/18   NA  139  137   POTASSIUM  4.4  4.6   CHLORIDE  95*  94*   KURTIS  10.4  10.2   CO2  35*  35*   BUN  13*  17   CR  0.62  0.68   GLC  126*  128*     GFR Estimate   Date Value Ref Range Status   07/23/2018 >60 ml/min/1.73m2 Final   07/16/2018 >60 >60 mL/min/1.73m2 Final   07/10/2018 >60 >60 mL/min/1.73m2 Final   07/05/2018 >60 >60 mL/min/1.73m2 Final   07/02/2018 >60 >60 mL/min/1.73m2 Final       TSH   Date Value Ref Range Status   02/05/2018 2.94 0.30 - 4.50 uIU/mL Final   02/02/2017 0.60 0.20 - 4.50 mcU/mL Final   02/02/2017 0.60 0.20 - 4.50 ulU/ml Final       Lab Results   Component Value Date    A1C 7.3 06/11/2018    A1C 6.9 02/05/2018     Assessment/Plan:  (I48.2) Chronic atrial fibrillation (H)  (primary encounter diagnosis)  Comment: Chronic with rate controlled on betablocker.  On ASA for anticoagulation. Pt has declined warfarin.  Plan: Continue current POC.  Monitor.    (E87.2) Respiratory acidosis  Comment: Much improved  Plan:  Continue with low flow O2 and if her sats tolerate being off O2 when up in w/c, continue efforts to attempt this.  BMP q3m or with acute change in condition.    (I50.9) CHF (congestive heart failure), NYHA class III, unspecified failure chronicity, unspecified type (H)  Comment: Chronic, stable at this time.  Plan: Continue current POC with diuretics and weight monitoring.  BMP q3m and with acute change in condition.    (J45.40) Moderate persistent reactive airway disease without complication  Comment: CHronic, but improved  Plan: Continue xopenex nebs.    (E11.9) Type 2 diabetes mellitus without complication, without long-term current use of insulin (H)  Comment: Chronic, but controlled with metformin  Plan: Continue current POC.    (G31.84) Mild cognitive impairment with memory loss  Comment: Slow decline with episodes of acute confusion secondary to respiratory status.  Plan: Continue current POC and close monitoring.     Electronically signed by  SABINE Borjas CNP

## 2018-08-13 NOTE — PROGRESS NOTES
[facility]  Chief Complaint   Patient presents with     Annual Comprehensive Nursing Home     [facility] Record Number:  [id number]    HPI:    Mireille Taylor is a 89 year old  (8/2/1929), who is being seen today for an annual comprehensive visit at [facility], [place].  HPI information obtained from: facility chart records, facility staff, patient report and [facility] chart review.  Today's concerns are:  Carbon dioxide retention  Pt remains on O2 much of day and at all times when in bed.  Flow rate at 1l/min and CO2 level as well as chloride level have improved significantly. Pt is mentally clearer, although does have some baseline cognitive loss.    Type 2 diabetes mellitus without complication, without long-term current use of insulin (H)  Accuchecks this month: 0730: 143-178, 1700: 109-163.  HgbA1C was 7.3% in June.    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  Remains on levothyroxine.  TSH was 2.94 in February.    CHF (congestive heart failure), NYHA class III, unspecified failure chronicity, unspecified type (H)  Weight has remained stable since last acute exacerbation.  No recent increase in diuretic.  Chronic LE edema also related to her immobility and dependent position much of day.  Has not required any prn metolazone this month.    Chronic atrial fibrillation (H)  Pulse ranges in past week: . No reports of chest pain.  Overall ventricular rate is less than 60 since metoprolol dose increased.  Remains on ASA for anticoagulation.  Pt declined warfarin.    BMI 35.0-35.9,adult  Weight is stable, weight fluctuations seem related to CHF issues vs weight loss or gain due to food intake.    Slow transit constipation  Pt requesting the restart of Miralax.  BM pattern in past week has been every other day to daily.  Agreed to try it every other day vs daily due to potential for frequent BM's.    Hypertension screen  BP ranges this month:   118-130/77-80    PHQ-9 score:    PHQ-9 SCORE 6/26/2018   Total Score -   Total Score 24       ALLERGIES: Bactrim [sulfamethoxazole w-trimethoprim]; Gabapentin; Lyrica; Methadone; Nsaids; Penicillin g; Tramadol; Cephalexin hcl; Clindamycin hcl; and Macrobid [nitrofurantoin]  PROBLEM LIST:  Patient Active Problem List   Diagnosis     GERD (gastroesophageal reflux disease)     Systolic murmur     CARDIOVASCULAR SCREENING; LDL GOAL LESS THAN 100     Osteoporosis     Peripheral neuropathy     Anxiety     Vitamin D deficiency     Mixed incontinence urge and stress     Health Care Home     Frequent UTI, Dr Bigg Muhammad, Urologist     Candidiasis of skin     Dependent edema     Benzodiazepine dependence, pt reports daily use of valium since the 1930's.     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     Chronic low back pain with bilateral sciatica     Macular degeneration (senile) of retina     Recurrent major depressive disorder, in partial remission (H)     Seborrheic dermatitis     Morbid obesity (H)     BMI 35.0-35.9,adult     Leg skin lesion, left     Chronic atrial fibrillation (H)     Reactive airway disease without complication     Mild cognitive impairment with memory loss     JAMES (obstructive sleep apnea)     CHF (congestive heart failure), NYHA class III, unspecified failure chronicity, unspecified type (H)     JAYME (generalized anxiety disorder)     Carbon dioxide retention     H/O breast implant, elective bilateral     PAST MEDICAL HISTORY:  has a past medical history of Advanced directives, counseling/discussion, POLST completed 8/27/15, DNR/DNI, No tube feedings, No dialysis,  Do not hospitalize unless comfort can be improved.  OK for IV's and antibiotics (8/27/2015); Agoraphobia without mention of panic attacks; Benzodiazepine dependence, pt reports daily use of valium since the 1930's.  (8/27/2015); Carbon dioxide retention (8/14/2018); CHF (congestive heart failure), NYHA class III, unspecified failure chronicity, unspecified type (H) (5/7/2018); Chronic atrial fibrillation (H) (10/20/2017); Chronic low back pain with bilateral sciatica (6/3/2016); Constipation (8/27/2015); Degenerative disc disease; Dependent edema (8/27/2015); Depressive disorder, not elsewhere classified; Esophageal reflux; JAYME (generalized anxiety disorder) (6/6/2018); Hypothyroidism; Leg skin lesion, left (8/18/2017); Macular degeneration (senile) of retina (8/23/2016); Mild cognitive impairment with memory loss (4/10/2018); Nasal bleeding (1/28/2016); Obstructive sleep apnea (adult) (pediatric); Postsurgical hypothyroidism, Surgical excision in 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 (1/28/2016); Reactive airway disease without complication (11/6/2017); Recurrent major depressive disorder, in partial remission (H) (12/6/2016); Seborrheic dermatitis (4/12/2017); Squamous cell carcinoma of skin of upper limb, including shoulder; Type 2 diabetes mellitus without complication (H) (12/16/2015); Unspecified curvature of spine; Unspecified essential hypertension; and Unspecified vitamin D deficiency.  PAST SURGICAL HISTORY:  has a past surgical history that includes Abdomen surgery (1940); hip surgery (2010); Foot surgery (2000); Hysterectomy vaginal (1980'S); Breast surgery (1970'S); Eye surgery (2003); Abdomen surgery (1990's); Head and neck surgery (1960's); knee surgery (9/2004, 11/2007); implant back (4/7/2009); and Implant stimulator and leads sacral nerve (stage one and two) (2/16/2015 2005).  FAMILY HISTORY: family history includes Family History Negative in her son, son, son, and son; HEART DISEASE in her father and mother.  SOCIAL HISTORY:  reports that she quit smoking about 58 years ago. Her smoking use included Cigarettes. She has a 24.00 pack-year smoking history. She has never used smokeless tobacco. She reports that she uses illicit drugs. She  reports that she does not drink alcohol.  IMMUNIZATIONS:  Most Recent Immunizations   Administered Date(s) Administered     Influenza (High Dose) 3 valent vaccine 10/12/2017     Influenza Vaccine IM 3yrs+ 4 Valent IIV4 10/06/2016     Mantoux Tuberculin Skin Test 08/21/2015     Pneumo Conj 13-V (2010&after) 08/21/2015     Pneumococcal 23 valent 11/05/2008     TDAP Vaccine (Adacel) 11/30/2012     Zoster vaccine, live 09/24/2007     Above immunizations pulled from [facility]. [facility] and facility records also reconciled.   Future immunizations needed:  yearly influenza per facility protocol  MEDICATIONS:  Current Outpatient Prescriptions   Medication Sig Dispense Refill     Acetaminophen (TYLENOL PO) Take 650 mg by mouth 4 times daily       Ascorbic Acid 1000 MG TABS Take 1,000 mg by mouth daily       aspirin 81 MG EC tablet Take 1 tablet (81 mg) by mouth daily 90 tablet 3     bisacodyl (DULCOLAX) 10 MG Suppository Every other day if no BM 25 suppository 1     chlorhexidine (PERIDEX) 0.12 % solution Swish and spit in mouth 2 times daily Rinse with 1/2 oz BID for 30 secs and expectorate       citalopram (CELEXA) 40 MG tablet TAKE 1 TABLET BY MOUTH DAILY 90 tablet 1     Cranberry (CRANBERRY CONCENTRATE) 500 MG CAPS Take 1 capsule (500 mg) by mouth 2 times daily 90 capsule      estradiol (ESTRACE VAGINAL) 0.1 MG/GM cream Place 2 g vaginally three times a week 42.5 g      furosemide (LASIX) 20 MG tablet Take 3 tablets (60 mg) by mouth daily 60 tablet 1     levalbuterol (XOPENEX) 0.63 MG/3ML neb solution Take 3 mLs (0.63 mg) by nebulization 2 times daily 360 mL      levalbuterol (XOPENEX) 0.63 MG/3ML neb solution Take 3 mLs (0.63 mg) by nebulization every 8 hours as needed for shortness of breath / dyspnea or wheezing And bid for two weeks. 360 mL      levothyroxine (SYNTHROID, LEVOTHROID) 125 MCG tablet Take 1 tablet (125 mcg) by mouth daily 90 tablet 1     LORazepam (ATIVAN) 0.5 MG tablet 0.25 mg in am and 0.5 mg at HS. 4  tablet 0     metFORMIN (GLUCOPHAGE) 500 MG tablet Take 1.5 tablets (750 mg) by mouth 2 times daily (with meals) 180 tablet 1     methenamine hippurate (HIPREX) 1 G TABS TAKE 1 TABLET BY MOUTH ONCE A DAY.  TAKE ALONG WITH 1000MG VITAMIN-C 180 tablet 4     MetOLazone (ZAROXOLYN PO) Take 2.5 mg by mouth Give 1/2 hour before lasix if weight is >235 lbs (M-W-F)       metoprolol tartrate (LOPRESSOR) 25 MG tablet Take 1 tablet (25 mg) by mouth 2 times daily Hold for systolic BP <100 or pulse <60. 90 tablet 3     mirabegron (MYRBETRIQ) 50 MG 24 hr tablet Take 1 tablet (50 mg) by mouth daily 30 tablet 11     Multiple Vitamins-Minerals (PRESERVISION AREDS 2 PO) Take 1 tablet by mouth 2 times daily       OxyCODONE HCl (ROXICODONE PO) Take 5 mg by mouth 2 times daily Give at 0630 and 2100. And q6h prn       pantoprazole (PROTONIX) 40 MG enteric coated tablet Take 1 tablet (40 mg) by mouth daily Take 30-60 minutes before a meal. 180 tablet 3     polyethylene glycol (MIRALAX/GLYCOLAX) powder Take 17 g by mouth daily as needed for constipation 119 g      potassium chloride SA (K-DUR/KLOR-CON M) 10 MEQ CR tablet Take 2 tablets (20 mEq) by mouth daily 90 tablet 1     Pseudoephedrine-DM-GG (ROBITUSSIN CF PO) Take 5 mLs by mouth 4 times daily as needed        Selenium Sulfide 2.25 % SHAM Externally apply 1 Application topically twice a week       sodium chloride ([employer] SALINE NASAL SPRAY) 0.65 % nasal spray Spray 2 sprays into both nostrils 2 times daily       traZODone (DESYREL) 50 MG tablet Take 1 tablet (50 mg) by mouth At Bedtime 30 tablet 1     VITAMIN D, CHOLECALCIFEROL, PO Take 2,000 Units by mouth daily       Medications reviewed:  Medications reconciled to facility chart and changes were made to reflect current medications as identified as above med list. Below are the changes that were made:   Medications stopped since last EPIC medication reconciliation:   There are no discontinued medications.    Medications started since  "last EPIC medication reconciliation:  No orders of the defined types were placed in this encounter.      Case Management:  I have reviewed the facility/SNF care plan/MDS which was done 6/26/18, including the falls risk, nutrition and pain screening. I also reviewed the current immunizations, and preventive care..Future cancer screening is not clinically indicated secondary to age/goals of care Patient's desire to return to the community is present, but is not able due to care needs . Current Level of Care is appropriate.    Advance Directive Discussion:    I reviewed the current advanced directives as reflected in EPIC, the POLST and the facility chart, and verified the congruency of orders . I reviewed the ACP with Mireille and she has requested no changes at this time.  Desires to stay at the NH for tx of acute illnesses and does not want hospitalization.  Is DNR/DNI. .     Team Discussion:  I communicated with the appropriate disciplines involved with the Plan of Care:   Nursing   and PT       Patient Goal:  Patient's goal is pain control and comfort.    Information reviewed:  Medications, vital signs, orders, and nursing notes.    ROS:  Limited secondary to cognitive impairment but today pt reports that she is overall feeling better, denies chest pain, abdominal pain or increase in joint and back pain.     Exam:  /78  Pulse 115  Temp 97  F (36.1  C)  Resp 20  Ht 5' 6\" (1.676 m)  Wt 235 lb (106.6 kg)  BMI 37.93 kg/m2  GENERAL APPEARANCE: Alert, overweight female seen. Skin warm and dry.  Pink facial color.   Able to  express self verbally. Talkative and no obvious acute distress..  HEAD:  Normocephalic.  No facial asymmetry.  NECK:  Thick neck,  Trachea midline.  No palpable lymphadenopathy..  ENT: Mouth and posterior oropharynx normal, moist mucous membranes, .  EYES: EOM normal, conjunctiva and lids normal.   RESP:  Diminished lung sounds bilateral lower lobes.  No wheezing or rhonchi.  Old surgical " scar from thyroidectomy visible at base of neck and upper chest.. Oxygen on at 1 l/min via NC.  No cough during visit..  CV: Irregularly, irregular rate and rhythm, 2/6 systolic murmur. +2/+3 ankle and pedal edema.  Heels slightly red and resting on bed surface.  BREASTS:  Large pendulous breasts.  Unable to clearly palpate breast implants.  No obvious breast masses.  No dimpling.  No redness under breasts.     ABDOMEN: Large, rounded abdomen. BS's positive all 4 quadrants. No discomfort with palpation of abdomen including suprapubic area.  MUSC:  No pain with gentle ROM of upper extremities.  Mild discomfort with flexion & extension of knees.    SKIN:  Reddened heels bilaterally.  No open areas.  NEURO:  Alert.  Purposeful movement of extremities without focal weakness, although legs are weaker than arms due to deconditioning. No tremors.  PSYCH:  Calm and positive mood.     Lab/Diagnostic data:   CBC RESULTS:   Recent Labs   Lab Test 07/23/18 07/10/18  06/11/18   09/21/13   1455  06/01/11   1845   WBC   --   5.6   --   6.0  6.0   < >  10.9   --    RBC   --   4.62   --   4.22  4.22   < >  4.77   --    HGB  13.3  12.9   < >  12.3  12.3   < >  14.9  15.0   HCT   --   43.7   --   42.3  42.3   < >  43.1  49.2   MCV   --   94.6   --   100.2*  100.2*   < >  90  94.1   MCH   --    --    --    --    --   31.2  28.7   MCHC   --    --    --    --    --   34.6  30.5*   RDW   --   15.9*   --   15.8*  15.8*   < >  12.7  13.7   PLT   --   202   --   138*  138*   < >  128*   --     < > = values in this interval not displayed.       Last Basic Metabolic Panel:  Recent Labs   Lab Test 07/23/18 07/16/18   NA  139  137   POTASSIUM  4.4  4.6   CHLORIDE  95*  94*   KURTIS  10.4  10.2   CO2  35*  35*   BUN  13*  17   CR  0.62  0.68   GLC  126*  128*     GFR Estimate   Date Value Ref Range Status   07/23/2018 >60 ml/min/1.73m2 Final   07/16/2018 >60 >60 mL/min/1.73m2 Final   07/10/2018 >60 >60 mL/min/1.73m2 Final   07/05/2018 >60 >60  mL/min/1.73m2 Final   07/02/2018 >60 >60 mL/min/1.73m2 Final       TSH   Date Value Ref Range Status   02/05/2018 2.94 0.30 - 4.50 uIU/mL Final   02/02/2017 0.60 0.20 - 4.50 mcU/mL Final   02/02/2017 0.60 0.20 - 4.50 ulU/ml Final       Lab Results   Component Value Date    A1C 7.3 06/11/2018    A1C 6.9 02/05/2018     ASSESSMENT/PLAN  (E11.9) Type 2 diabetes mellitus without complication, without long-term current use of insulin (H)  (primary encounter diagnosis)  Comment: Chronic, HgbA1C goal <8%, at goal  Plan: Continue current dose of metformin and current schedule of accuchecks.  HgbA1C in two months.    (E87.2) Carbon dioxide retention  Comment: Much improved  Plan: Continue low flow oxygen and monitoring.    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  Comment: Chronic  Plan: Continue current dose of levothyroxine and check TSH annually.    (I50.9) CHF (congestive heart failure), NYHA class III, unspecified failure chronicity, unspecified type (H)  Comment: Chronic, but stable  Plan: Continue current dose of lasix and KCL.  PRN zaroxolyn for weights >235lbs. BMP q3m or with acute illness.    (I48.2) Chronic atrial fibrillation (H)  Comment: Chronic  Plan: Continue metoprolol and ASA.  MOnitor.    (Z68.35) BMI 35.0-35.9,adult  Comment: Chronic  Plan: Continue to encourage good food choices.    (K59.01) Slow transit constipation  Comment: Chronic  Plan: Start Miralax every other day and monitor for effect.    (Z13.6) Hypertension screen  Comment: Based on JNC-8 goals,  patients age of 89 year old, presence of diabetes or CKD, and goals of care goal BP is <150/90 mm Hg. Patient is stable with current plan of care and routine assessment..    (Z98.82) H/O breast implant, elective bilateral  Comment: Chronic, unclear status of implants.  Plan: Yearly breast exams.    Electronically signed by:  SABINE Borjas CNP

## 2018-08-14 PROBLEM — E87.29 CARBON DIOXIDE RETENTION: Status: ACTIVE | Noted: 2018-01-01

## 2018-08-14 PROBLEM — Z98.82 H/O BREAST IMPLANT: Status: ACTIVE | Noted: 2018-01-01

## 2018-09-24 NOTE — PROGRESS NOTES
"[place]    Chief Complaint   Patient presents with     [facility]     [facility] Medical Record Number:  [id number]    HPI:    Mireille Taylor is a 89 year old  (8/2/1929), who is being seen today for an episodic care visit at [facility] .  HPI information obtained from: facility chart records, facility staff, patient report and [facility] chart review.Today's concern is:  Type 2 diabetes mellitus without complication, without long-term current use of insulin (H)  Remains on metformin.  Accuchecks in past 2 weeks: 0730: 112-158, 1700: 112-142.  HgbA1C was 7.3% on 6/11/18.    Recurrent major depressive disorder, in partial remission (H)  No recent change to psych meds.  PHQ 9 score of 24. Pt today wanted to discuss her concerns about \"losing\" me as a NP.  Notified her that I will continue with being her NP and she stated that she is incredibly relieved.     Chronic atrial fibrillation (H)  Heart rate ranges in past two weeks: 76-98.  Remains on ASA for anticoagulation and metoprlol for rate control.  BP ranges in past two weeks: 96/54 - 121/71.    Mild cognitive impairment with memory loss  BIMS score of 10.  Lacks insight into physical and functional losses. Will become easily accusatory of others, with statements that are not supported with facts.  No longer able to use her electric scooter, as nsg and therapy have determined that she is not safe to operate it.    CHF (congestive heart failure), NYHA class III, unspecified failure chronicity, unspecified type (H)  Weight is stable O2 on at all times with flow rate of 1l/min.      ALLERGIES: Bactrim [sulfamethoxazole w-trimethoprim]; Gabapentin; Lyrica; Methadone; Nsaids; Penicillin g; Tramadol; Cephalexin hcl; Clindamycin hcl; and Macrobid [nitrofurantoin]  Past Medical, Surgical, Family and Social History reviewed and updated in [facility].    Current Outpatient Prescriptions   Medication Sig Dispense Refill     Acetaminophen (TYLENOL PO) " Take 650 mg by mouth 4 times daily       Ascorbic Acid 1000 MG TABS Take 1,000 mg by mouth daily       aspirin 81 MG EC tablet Take 1 tablet (81 mg) by mouth daily 90 tablet 3     bisacodyl (DULCOLAX) 10 MG Suppository Every other day if no BM 25 suppository 1     chlorhexidine (PERIDEX) 0.12 % solution Swish and spit in mouth 2 times daily Rinse with 1/2 oz BID for 30 secs and expectorate       citalopram (CELEXA) 40 MG tablet TAKE 1 TABLET BY MOUTH DAILY 90 tablet 1     Cranberry (CRANBERRY CONCENTRATE) 500 MG CAPS Take 1 capsule (500 mg) by mouth 2 times daily 90 capsule      estradiol (ESTRACE VAGINAL) 0.1 MG/GM cream Place 2 g vaginally three times a week 42.5 g      furosemide (LASIX) 20 MG tablet Take 3 tablets (60 mg) by mouth daily 60 tablet 1     levalbuterol (XOPENEX) 0.63 MG/3ML neb solution Take 3 mLs (0.63 mg) by nebulization 2 times daily 360 mL      levalbuterol (XOPENEX) 0.63 MG/3ML neb solution Take 3 mLs (0.63 mg) by nebulization every 8 hours as needed for shortness of breath / dyspnea or wheezing And bid for two weeks. 360 mL      levothyroxine (SYNTHROID, LEVOTHROID) 125 MCG tablet Take 1 tablet (125 mcg) by mouth daily 90 tablet 1     LORazepam (ATIVAN) 0.5 MG tablet 0.25 mg in am and 0.5 mg at HS. 4 tablet 0     metFORMIN (GLUCOPHAGE) 500 MG tablet Take 1.5 tablets (750 mg) by mouth 2 times daily (with meals) 180 tablet 1     methenamine hippurate (HIPREX) 1 G TABS TAKE 1 TABLET BY MOUTH ONCE A DAY.  TAKE ALONG WITH 1000MG VITAMIN-C 180 tablet 4     MetOLazone (ZAROXOLYN PO) Take 2.5 mg by mouth Give 1/2 hour before lasix if weight is >235 lbs (M-W-F)       metoprolol tartrate (LOPRESSOR) 25 MG tablet Take 1 tablet (25 mg) by mouth 2 times daily Hold for systolic BP <100 or pulse <60. 90 tablet 3     mirabegron (MYRBETRIQ) 50 MG 24 hr tablet Take 1 tablet (50 mg) by mouth daily 30 tablet 11     Multiple Vitamins-Minerals (PRESERVISION AREDS 2 PO) Take 1 tablet by mouth 2 times daily        OxyCODONE HCl (ROXICODONE PO) Take 5 mg by mouth 2 times daily Give at 0630 and 2100. And q6h prn       pantoprazole (PROTONIX) 40 MG enteric coated tablet Take 1 tablet (40 mg) by mouth daily Take 30-60 minutes before a meal. 180 tablet 3     polyethylene glycol (MIRALAX) powder Take 17 g (1 capful) by mouth every other day 510 g 1     polyethylene glycol (MIRALAX/GLYCOLAX) powder Take 17 g by mouth daily as needed for constipation 119 g      potassium chloride SA (K-DUR/KLOR-CON M) 10 MEQ CR tablet Take 2 tablets (20 mEq) by mouth daily 90 tablet 1     Pseudoephedrine-DM-GG (ROBITUSSIN CF PO) Take 5 mLs by mouth 4 times daily as needed        Selenium Sulfide 2.25 % SHAM Externally apply 1 Application topically twice a week       sodium chloride ([employer] SALINE NASAL SPRAY) 0.65 % nasal spray Spray 2 sprays into both nostrils 2 times daily       traZODone (DESYREL) 50 MG tablet Take 1 tablet (50 mg) by mouth At Bedtime 30 tablet 1     VITAMIN D, CHOLECALCIFEROL, PO Take 2,000 Units by mouth daily       Medications reviewed:  Medications reconciled to facility chart and changes were made to reflect current medications as identified as above med list. Below are the changes that were made:   Medications stopped since last EPIC medication reconciliation:   There are no discontinued medications.    Medications started since last [facility] medication reconciliation:  No orders of the defined types were placed in this encounter.    Patient Active Problem List   Diagnosis     GERD (gastroesophageal reflux disease)     Systolic murmur     CARDIOVASCULAR SCREENING; LDL GOAL LESS THAN 100     Osteoporosis     Peripheral neuropathy     Anxiety     Vitamin D deficiency     Mixed incontinence urge and stress     Health Care Home     Frequent UTI, Dr Bigg Muhammad, Urologist     Candidiasis of skin     Dependent edema     Benzodiazepine dependence, pt reports daily use of valium since the 1930's.     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     Chronic low back pain with bilateral sciatica     Macular degeneration (senile) of retina     Recurrent major depressive disorder, in partial remission (H)     Seborrheic dermatitis     Morbid obesity (H)     BMI 35.0-35.9,adult     Leg skin lesion, left     Chronic atrial fibrillation (H)     Reactive airway disease without complication     Mild cognitive impairment with memory loss     JAMES (obstructive sleep apnea)     CHF (congestive heart failure), NYHA class III, unspecified failure chronicity, unspecified type (H)     JAYME (generalized anxiety disorder)     Carbon dioxide retention     H/O breast implant, elective bilateral       REVIEW OF SYSTEMS:  Limited secondary to cognitive impairment but today pt reports that she is overall feeling better, denies chest pain, abdominal pain or increase in joint and back pain.     Physical Exam:  /66  Pulse 120  Temp 98  F (36.7  C)  Resp 18  Ht 5' 6\" (1.676 m)  Wt 227 lb (103 kg)  BMI 36.64 kg/m2  GENERAL APPEARANCE: Alert, overweight female seen. Skin warm and dry.  Pink facial color.   Able to  express self verbally. Talkative and no obvious acute distress..  HEAD:  Normocephalic.  No facial asymmetry.  NECK:  Thick neck,  Trachea midline.  No palpable lymphadenopathy..  ENT: Mouth and posterior oropharynx normal, moist mucous membranes, .  EYES: EOM normal, conjunctiva and lids normal.   RESP:  Diminished lung sounds bilateral lower lobes.  No wheezing or rhonchi.  Old surgical scar from thyroidectomy visible at base of neck and upper chest.. Oxygen on at 1 l/min via NC.  No cough during visit..  CV: Irregularly, irregular rate and rhythm, 2/6 systolic murmur. +2/+3 ankle and pedal edema.      ABDOMEN: Large, rounded abdomen. BS's positive all 4 quadrants. No discomfort with palpation of abdomen including suprapubic " area.  MUSC:  No pain with gentle ROM of upper extremities.  Mild discomfort with flexion & extension of knees.    NEURO:  Alert.  Purposeful movement of extremities without focal weakness, although legs are weaker than arms due to deconditioning. No tremors.  PSYCH:  Calm and positive mood after reassurance that Dr. Leon and I would remains her care team.      Recent Labs:   CBC RESULTS:   Recent Labs   Lab Test 07/23/18 07/10/18 06/11/18  09/21/13   1455  06/01/11   1845   WBC   --   5.6  6.0  6.0  10.9   --    RBC   --   4.62  4.22  4.22  4.77   --    HGB  13.3  12.9  12.3  12.3  14.9  15.0   HCT   --   43.7  42.3  42.3  43.1  49.2   MCV   --   94.6  100.2*  100.2*  90  94.1   MCH   --    --    --   31.2  28.7   MCHC   --    --    --   34.6  30.5*   RDW   --   15.9*  15.8*  15.8*  12.7  13.7   PLT   --   202  138*  138*  128*   --        Last Basic Metabolic Panel:  Recent Labs   Lab Test 07/23/18 07/16/18   NA  139  137   POTASSIUM  4.4  4.6   CHLORIDE  95*  94*   KURTIS  10.4  10.2   CO2  35*  35*   BUN  13*  17   CR  0.62  0.68   GLC  126*  128*     TSH   Date Value Ref Range Status   02/05/2018 2.94 0.30 - 4.50 uIU/mL Final   02/02/2017 0.60 0.20 - 4.50 mcU/mL Final   02/02/2017 0.60 0.20 - 4.50 ulU/ml Final       Lab Results   Component Value Date    A1C 7.3 06/11/2018    A1C 6.9 02/05/2018     Assessment/Plan:  (E11.9) Type 2 diabetes mellitus without complication, without long-term current use of insulin (H)  (primary encounter diagnosis)  Comment: Chronic, HgbA1C goal <8%, at goal  Plan: Coninue current dose of metformin and check HgbA1C at next lab day..    (F33.41) Recurrent major depressive disorder, in partial remission (H)  Comment: Ongoing issue, but mood is brighter today.   Plan: Continue current dose of citalopram and monitor.    (I48.2) Chronic atrial fibrillation (H)  Comment: Chronic  Plan: Continue amlodopine and metoprolol.  Pt declining warfarin and prefers ASA.  Dr Leon to see on  Monday.    (G31.84) Mild cognitive impairment with memory loss  Comment: Chronic  Plan: Continue to monitor for expected cognitive loss.    (I50.9) CHF (congestive heart failure), NYHA class III, unspecified failure chronicity, unspecified type (H)  Comment: Chronic  Plan:Continue current dose of lasix and check BMP on Thursday.    Electronically signed by  SABINE Borjas CNP

## 2018-10-22 NOTE — PROGRESS NOTES
[facility]    Chief Complaint   Patient presents with     [facility]     [facility] Medical Record Number:  [id number]  Place of Service where encounter took place:  [facility] [[id number]]    HPI:    Mireille Taylor is a 89 year old  (8/2/1929), who is being seen today for an episodic care visit.  HPI information obtained from: facility chart records, facility staff, patient report and [facility] chart review. Pt is not reliable in ROS due to advancing confusion. Today's concern is:  CHF (congestive heart failure), NYHA class III, unspecified failure chronicity, unspecified type (H)  Nsg reported on 10/21 that pt's LE edema was increased, but weight was not increased from baseline.  Remains on lasix and prn zaroxolyn for weights >235lbs, which she has not had.  Today, she is refusing her meds.  Continues to require low flow oxygen to maintain sats >90%.  Received influenza vaccine one week ago.     Type 2 diabetes mellitus without complication, without long-term current use of insulin (H)  Accuchecks in past 4 days: 0730: 106-130, 1700:: 104-133.  No recent changes to metformin dose.     Chronic atrial fibrillation (H)  BP ranges in past 4 days:  112/65 - 114/79. Pulse ranges: 96 - 114. Metoprolol held twice this month at  due to systolic BP <100.      Confusion  SLowly advancing dementia.  Poor recall of recent events.  Suspicious of staff motives with interventions.  Requires alexandro for transfers.    Carbon dioxide retention  Elevated CO2 with low chloride at times, especially with diuresis or O2 flow rates of 2 or greater.    Morbid obesity (H)  Weights in past two weeks: 224-228 lbs.      ALLERGIES: Bactrim [sulfamethoxazole w-trimethoprim]; Gabapentin; Lyrica; Methadone; Nsaids; Penicillin g; Tramadol; Cephalexin hcl; Clindamycin hcl; and Macrobid [nitrofurantoin]  Past Medical, Surgical, Family and Social History reviewed and updated in EPIC.    Current  Outpatient Prescriptions   Medication Sig Dispense Refill     Acetaminophen (TYLENOL PO) Take 650 mg by mouth 4 times daily       Ascorbic Acid 1000 MG TABS Take 1,000 mg by mouth daily       aspirin 81 MG EC tablet Take 1 tablet (81 mg) by mouth daily 90 tablet 3     bisacodyl (DULCOLAX) 10 MG Suppository Every other day if no BM 25 suppository 1     chlorhexidine (PERIDEX) 0.12 % solution Swish and spit in mouth 2 times daily Rinse with 1/2 oz BID for 30 secs and expectorate       citalopram (CELEXA) 40 MG tablet TAKE 1 TABLET BY MOUTH DAILY 90 tablet 1     Cranberry (CRANBERRY CONCENTRATE) 500 MG CAPS Take 1 capsule (500 mg) by mouth 2 times daily 90 capsule      estradiol (ESTRACE VAGINAL) 0.1 MG/GM cream Place 2 g vaginally three times a week 42.5 g      furosemide (LASIX) 20 MG tablet Take 3 tablets (60 mg) by mouth daily 60 tablet 1     levalbuterol (XOPENEX) 0.63 MG/3ML neb solution Take 3 mLs (0.63 mg) by nebulization 2 times daily 360 mL      levalbuterol (XOPENEX) 0.63 MG/3ML neb solution Take 3 mLs (0.63 mg) by nebulization every 8 hours as needed for shortness of breath / dyspnea or wheezing And bid for two weeks. 360 mL      levothyroxine (SYNTHROID, LEVOTHROID) 125 MCG tablet Take 1 tablet (125 mcg) by mouth daily 90 tablet 1     LORazepam (ATIVAN) 0.5 MG tablet 0.25 mg in am and 0.5 mg at HS. 4 tablet 0     metFORMIN (GLUCOPHAGE) 500 MG tablet Take 1.5 tablets (750 mg) by mouth 2 times daily (with meals) 180 tablet 1     methenamine hippurate (HIPREX) 1 G TABS TAKE 1 TABLET BY MOUTH ONCE A DAY.  TAKE ALONG WITH 1000MG VITAMIN-C 180 tablet 4     MetOLazone (ZAROXOLYN PO) Take 2.5 mg by mouth Give 1/2 hour before lasix if weight is >235 lbs (M-W-F)       metoprolol tartrate (LOPRESSOR) 25 MG tablet Take 1 tablet (25 mg) by mouth 2 times daily Hold for systolic BP <100 or pulse <60. 90 tablet 3     mirabegron (MYRBETRIQ) 50 MG 24 hr tablet Take 1 tablet (50 mg) by mouth daily 30 tablet 11     Multiple  Vitamins-Minerals (PRESERVISION AREDS 2 PO) Take 1 tablet by mouth 2 times daily       OxyCODONE HCl (ROXICODONE PO) Take 5 mg by mouth 2 times daily Give at 0630 and 2100. And q6h prn       pantoprazole (PROTONIX) 40 MG enteric coated tablet Take 1 tablet (40 mg) by mouth daily Take 30-60 minutes before a meal. 180 tablet 3     polyethylene glycol (MIRALAX) powder Take 17 g (1 capful) by mouth every other day 510 g 1     polyethylene glycol (MIRALAX/GLYCOLAX) powder Take 17 g by mouth daily as needed for constipation 119 g      potassium chloride SA (K-DUR/KLOR-CON M) 10 MEQ CR tablet Take 2 tablets (20 mEq) by mouth daily 90 tablet 1     Pseudoephedrine-DM-GG (ROBITUSSIN CF PO) Take 5 mLs by mouth 4 times daily as needed        Selenium Sulfide 2.25 % SHAM Externally apply 1 Application topically twice a week       sodium chloride ([employer]S SALINE NASAL SPRAY) 0.65 % nasal spray Spray 2 sprays into both nostrils 2 times daily       traZODone (DESYREL) 50 MG tablet Take 1 tablet (50 mg) by mouth At Bedtime 30 tablet 1     VITAMIN D, CHOLECALCIFEROL, PO Take 2,000 Units by mouth daily       Medications reviewed:  Medications reconciled to facility chart and changes were made to reflect current medications as identified as above med list. Below are the changes that were made:   Medications stopped since last EPIC medication reconciliation:   There are no discontinued medications.    Medications started since last [facility] medication reconciliation:  No orders of the defined types were placed in this encounter.    Patient Active Problem List   Diagnosis     GERD (gastroesophageal reflux disease)     Systolic murmur     CARDIOVASCULAR SCREENING; LDL GOAL LESS THAN 100     Osteoporosis     Peripheral neuropathy     Anxiety     Vitamin D deficiency     Mixed incontinence urge and stress     Health Care Home     Frequent UTI, Dr Bigg Muhammad, Urologist     Candidiasis of skin     Dependent edema     Benzodiazepine dependence, pt  "reports daily use of valium since the 1930's.     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     Chronic low back pain with bilateral sciatica     Macular degeneration (senile) of retina     Recurrent major depressive disorder, in partial remission (H)     Seborrheic dermatitis     Morbid obesity (H)     BMI 35.0-35.9,adult     Leg skin lesion, left     Chronic atrial fibrillation (H)     Reactive airway disease without complication     Mild cognitive impairment with memory loss     JAMES (obstructive sleep apnea)     CHF (congestive heart failure), NYHA class III, unspecified failure chronicity, unspecified type (H)     JAYME (generalized anxiety disorder)     Carbon dioxide retention     H/O breast implant, elective bilateral     Most Recent Immunizations   Administered Date(s) Administered     Influenza (High Dose) 3 valent vaccine 10/15/2018     Influenza Vaccine IM 3yrs+ 4 Valent IIV4 10/06/2016     Mantoux Tuberculin Skin Test 08/21/2015     Pneumo Conj 13-V (2010&after) 08/21/2015     Pneumococcal 23 valent 11/05/2008     TDAP Vaccine (Adacel) 11/30/2012     Zoster vaccine, live 09/24/2007     REVIEW OF SYSTEMS:  Limited secondary to cognitive impairment but today pt reports that she is \"no good\" but cannot identify specifically why, denies chest pain, abdominal pain or increase in joint and back pain.     Physical Exam:  /76  Pulse 96  Temp 98  F (36.7  C)  Resp 20  Ht 5' 6\" (1.676 m)  Wt 224 lb (101.6 kg)  BMI 36.15 kg/m2  GENERAL APPEARANCE: Alert, overweight female seen. Skin warm and dry.  Pink facial color.   Able to  express self verbally. Talkative with flat and sad affect..  HEAD:  Normocephalic.  No facial asymmetry.  NECK:  Thick neck,  Trachea midline.  No palpable lymphadenopathy..  ENT: Mouth and posterior oropharynx normal, moist mucous membranes, .  EYES: EOM normal, " "conjunctiva and lids normal.   RESP:  Diminished lung sounds bilateral lower lobes.  No wheezing or rhonchi.  Old surgical scar from thyroidectomy visible at base of neck and upper chest.. Oxygen on at 1 l/min via NC. O2 sats at 88-89%.  Increased flow rate to 93% and her sats increased to 93%..  No cough during visit..  CV: Irregularly, irregular rate and rhythm, 2/6 systolic murmur. +3/+4 leg,ankle and pedal edema.      ABDOMEN: Large, rounded abdomen. BS's positive all 4 quadrants. No discomfort with palpation of abdomen including suprapubic area.  MUSC:  No pain with gentle ROM of upper extremities.  Mild discomfort with flexion & extension of knees.    NEURO:  Alert.  Purposeful movement of extremities without focal weakness, although legs are weaker than arms due to deconditioning. No tremors.  PSYCH:  Discussed her refusal of meds.  She was unable to identify why she did not want them, but realizes \"that was probably a mistake\".  Discussed her goals of care and wishes.  She is clear that she wants to remain in the [facility] home and never to go to the hospital.  When we discussed comfort and the need for her medications to achieve her comfort, she quickly agreed that she would take them and stated \"I guess I won't pull that one again\".  Expressed gratitude at end of visit.         Recent Labs:   CBC RESULTS:   Recent Labs   Lab Test 09/27/18 07/23/18 07/10/18 06/11/18  09/21/13   1455  06/01/11   1845   WBC   --    --   5.6  6.0  6.0  10.9   --    RBC   --    --   4.62  4.22  4.22  4.77   --    HGB  12.6  13.3  12.9  12.3  12.3  14.9  15.0   HCT   --    --   43.7  42.3  42.3  43.1  49.2   MCV   --    --   94.6  100.2*  100.2*  90  94.1   MCH   --    --    --    --   31.2  28.7   MCHC   --    --    --    --   34.6  30.5*   RDW   --    --   15.9*  15.8*  15.8*  12.7  13.7   PLT   --    --   202  138*  138*  128*   --        Last Basic Metabolic Panel:  Recent Labs   Lab Test 09/27/18 07/23/18   NA  139  139 "   POTASSIUM  4.1  4.4   CHLORIDE  88*  95*   KURTIS  10.2  10.4   CO2  40*  35*   BUN  14  13*   CR  0.65  0.62   GLC  94  126*     GFR Estimate   Date Value Ref Range Status   09/27/2018 >60 >60 mL/min/1.73m2 Final   07/23/2018 >60 ml/min/1.73m2 Final   07/16/2018 >60 >60 mL/min/1.73m2 Final   07/10/2018 >60 >60 mL/min/1.73m2 Final   07/05/2018 >60 >60 mL/min/1.73m2 Final       Lab Results   Component Value Date    A1C 6.3 09/27/2018    A1C 7.3 06/11/2018     Assessment/Plan:  (I50.9) CHF (congestive heart failure), NYHA class III, unspecified failure chronicity, unspecified type (H)  (primary encounter diagnosis)  Comment: Ongoing, unclear if current edema is indicator of worsening cardiac function.  Plan: CBC, BMP and BNP tomorrow.  Continue to monitor VS's and weight.  Continue with current dose of lasix and PRN zaroxolyn available if weight increases.  When she declines further, initiate hospice care.  Continue oxygen at low flow.  Dr. Leon to see in one week.    (E11.9) Type 2 diabetes mellitus without complication, without long-term current use of insulin (H)  Comment: Chronic, controlled.  HgbA1C goal <9%.  At goal but A1C may be lower than desired given her age group and kidney function..   Plan: Decrease HS metformin to 500 mg and nsg to update me in one week on accuchecks.      (I48.2) Chronic atrial fibrillation (H)  Comment: Chronic, difficult to control at times due to her low BP.  Plan: Continue current POC and review with Dr. Leon.    (R41.0) Confusion  Comment: Advancing dementia  Plan: BMP in am to check on electrolytes.  Continue to support with her delusional thoughts as well as functional needs.    (E87.2) Carbon dioxide retention  Comment: Chronic  Plan: BMP in am.     (E66.01) Morbid obesity (H)  Comment: Chronic, but weight controlled.  Plan: Continue to monitor.     Electronically signed by  SABINE Borjas CNP

## 2018-10-29 NOTE — TELEPHONE ENCOUNTER
Patient with CHF. Doing well today. BNP 1029. BMP WNL but K 3.2. Takes KCL 20 meq PO BID    PLAN  Monitor. Increase KCL to 30 meq PO BID. Check BMP one week.     Electronically signed by SABINE Gibbs GNP

## 2018-11-01 NOTE — PROGRESS NOTES
Pt was seen for an regulatory LTC visit  Case reviewed with NP    Pt has recently developed increased B LE edema and wt gain  She received one dose of Zaroxolyn 2 days ago.  She remains on furosemide 60 mg daily    She notes mild SOB, denies chest pain, cough    VSS  Alert, in NAD, sitting in WC, wearing 02  Pleasant, oriented to person, place, general circumstances  Lungs clear, BS decreased B  CV irreg, HR 70  Abd soft, non-tender  2+ LE edema B    Assessment    CHF, in exacerbation    Chronic afib, HR controlled,     DM type 2, stable    Plan  Continue increased diuretic, monitor wt, exam, BMP closely  May need scheduled low dose Zaroxolyn

## 2018-11-02 PROBLEM — E87.4 ACID-BASE IMBALANCE: Status: ACTIVE | Noted: 2018-01-01

## 2018-11-02 PROBLEM — J45.909 RAD (REACTIVE AIRWAY DISEASE) WITH WHEEZING: Status: ACTIVE | Noted: 2017-11-06

## 2018-11-02 NOTE — PROGRESS NOTES
[facility]    Chief Complaint   Patient presents with     RECHECK     [facility] Record Number:  [id number]  [facility] where [facility] took place[facility] [[id number]]    HPI:    Mireille Taylor is a 89 year old  (8/2/1929), who is being seen today for an episodic care visit.  HPI information obtained from: facility chart records, facility staff, patient report, [facility] Epic chart review and Pt cognitive status is declining and is a poor historian.Today's concern is:  Acid-base imbalance  Pt has h/o elevated CO2 and low chloride levels chronically with exacerbations when her CHF symptoms are increased and she needs additional lasix.  Evaluation of this is complicated by her chronic lung disease.  Requires O2 to maintain sats >90% and is a chronic CO2 retainer. Due to increasing edema, received Zaroxolyn twice one week ago and on Monday her potassium was down to 3.2, chloride declined to 83 and CO2 was >45. Potassium dose increased.    CHF (congestive heart failure), NYHA class III, unspecified failure chronicity, unspecified type (H)  Weight has not increased, but appetite is poor, so this is difficult to use as a measurement of edema.  Continues with pitting edema to her hips bilaterally.  Continues on O2 via NC and if flow rate is decreased to 1, her O2 sats have been <90.  Remains on lasix daily.  Goals of care are comfort focused.  Seen by Dr. Leon on 10/29 and recommended hospice consideration.    Chronic atrial fibrillation (H)  Ongoing elevations of heart rate at times.  Today, before her metoprolol, her heart rate was 110. On ASA for anticoagulation.    Type 2 diabetes mellitus without complication, without long-term current use of insulin (H)  Metformin dose decreased on 10/22.  Accuchecks in past 4 days: 0730: 122-158, 1700: 151-212    Mild cognitive impairment with memory loss  Ongoing decline in cognitive function.  Due to poor recall of recent  events or difficulty with processing medical information, her son Raudel is her decision maker.    ALLERGIES: Bactrim [sulfamethoxazole w-trimethoprim]; Gabapentin; Lyrica; Methadone; Nsaids; Penicillin g; Tramadol; Cephalexin hcl; Clindamycin hcl; and Macrobid [nitrofurantoin]  Past Medical, Surgical, Family and Social History reviewed and updated in [facility].    Current Outpatient Prescriptions   Medication Sig Dispense Refill     Acetaminophen (TYLENOL PO) Take 650 mg by mouth 4 times daily       Ascorbic Acid 1000 MG TABS Take 1,000 mg by mouth daily       aspirin 81 MG EC tablet Take 1 tablet (81 mg) by mouth daily 90 tablet 3     bisacodyl (DULCOLAX) 10 MG Suppository Every other day if no BM 25 suppository 1     chlorhexidine (PERIDEX) 0.12 % solution Swish and spit in mouth 2 times daily Rinse with 1/2 oz BID for 30 secs and expectorate       citalopram (CELEXA) 40 MG tablet TAKE 1 TABLET BY MOUTH DAILY 90 tablet 1     Cranberry (CRANBERRY CONCENTRATE) 500 MG CAPS Take 1 capsule (500 mg) by mouth 2 times daily 90 capsule      estradiol (ESTRACE VAGINAL) 0.1 MG/GM cream Place 2 g vaginally three times a week 42.5 g      furosemide (LASIX) 20 MG tablet Take 3 tablets (60 mg) by mouth daily 60 tablet 1     levalbuterol (XOPENEX) 0.63 MG/3ML neb solution Take 3 mLs (0.63 mg) by nebulization 2 times daily 360 mL      levalbuterol (XOPENEX) 0.63 MG/3ML neb solution Take 3 mLs (0.63 mg) by nebulization every 8 hours as needed for shortness of breath / dyspnea or wheezing And bid for two weeks. 360 mL      levothyroxine (SYNTHROID, LEVOTHROID) 125 MCG tablet Take 1 tablet (125 mcg) by mouth daily 90 tablet 1     LORazepam (ATIVAN) 0.5 MG tablet 0.25 mg in am and 0.5 mg at HS. 4 tablet 0     metFORMIN (GLUCOPHAGE) 500 MG tablet 750 mg in am and 500 mg pm. 180 tablet 1     methenamine hippurate (HIPREX) 1 G TABS TAKE 1 TABLET BY MOUTH ONCE A DAY.  TAKE ALONG WITH 1000MG VITAMIN-C 180 tablet 4     MetOLazone (ZAROXOLYN PO)  Take 2.5 mg by mouth Give 1/2 hour before lasix if weight is >235 lbs (M-W-F)       metoprolol tartrate (LOPRESSOR) 25 MG tablet Take 1 tablet (25 mg) by mouth 2 times daily Hold for systolic BP <100 or pulse <60. 90 tablet 3     mirabegron (MYRBETRIQ) 50 MG 24 hr tablet Take 1 tablet (50 mg) by mouth daily 30 tablet 11     Multiple Vitamins-Minerals (PRESERVISION AREDS 2 PO) Take 1 tablet by mouth 2 times daily       OxyCODONE HCl (ROXICODONE PO) Take 5 mg by mouth 2 times daily Give at 0630 and 2100. And q6h prn       pantoprazole (PROTONIX) 40 MG enteric coated tablet Take 1 tablet (40 mg) by mouth daily Take 30-60 minutes before a meal. 180 tablet 3     polyethylene glycol (MIRALAX) powder Take 17 g (1 capful) by mouth every other day 510 g 1     polyethylene glycol (MIRALAX/GLYCOLAX) powder Take 17 g by mouth daily as needed for constipation 119 g      potassium chloride SA (K-DUR/KLOR-CON M) 10 MEQ CR tablet Take 2 tablets (20 mEq) by mouth 2 times daily 90 tablet 1     Pseudoephedrine-DM-GG (ROBITUSSIN CF PO) Take 5 mLs by mouth 4 times daily as needed        Selenium Sulfide 2.25 % SHAM Externally apply 1 Application topically twice a week       sodium chloride ([employer] SALINE NASAL SPRAY) 0.65 % nasal spray Spray 2 sprays into both nostrils 2 times daily       traZODone (DESYREL) 50 MG tablet Take 1 tablet (50 mg) by mouth At Bedtime 30 tablet 1     VITAMIN D, CHOLECALCIFEROL, PO Take 2,000 Units by mouth daily       Medications reviewed:  Medications reconciled to facility chart and changes were made to reflect current medications as identified as above med list. Below are the changes that were made:   Medications stopped since last EPIC medication reconciliation:   There are no discontinued medications.    Medications started since last [facility] medication reconciliation:  No orders of the defined types were placed in this encounter.    Patient Active Problem List   Diagnosis     GERD (gastroesophageal reflux  disease)     Systolic murmur     CARDIOVASCULAR SCREENING; LDL GOAL LESS THAN 100     Osteoporosis     Peripheral neuropathy     Anxiety     Vitamin D deficiency     Mixed incontinence urge and stress     Health Care Home     Frequent UTI, Dr Bigg Muhammad, Urologist     Candidiasis of skin     Dependent edema     Benzodiazepine dependence, pt reports daily use of valium since the 1930's.     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     Chronic low back pain with bilateral sciatica     Macular degeneration (senile) of retina     Recurrent major depressive disorder, in partial remission (H)     Seborrheic dermatitis     Morbid obesity (H)     BMI 35.0-35.9,adult     Leg skin lesion, left     Chronic atrial fibrillation (H)     RAD (reactive airway disease) with wheezing     Mild cognitive impairment with memory loss     JAMES (obstructive sleep apnea)     CHF (congestive heart failure), NYHA class III, unspecified failure chronicity, unspecified type (H)     JAYME (generalized anxiety disorder)     Carbon dioxide retention     H/O breast implant, elective bilateral     Acid-base imbalance     REVIEW OF SYSTEMS:  Limited secondary to cognitive impairment but today pt reports that she is okay, but is very confused as to time, worrying that [facility] is approaching.    Physical Exam:  /68  Pulse 66  Temp 98.1  F (36.7  C)  Resp 18  Wt 227 lb (103 kg)  SpO2 90%  BMI 36.64 kg/m2   Current pulse is 110.  GENERAL APPEARANCE: Alert, overweight female seen. Skin warm and dry.  Pink facial color.   Able to  express self verbally. Talkative with flat and sad affect..  HEAD:  Normocephalic.  No facial asymmetry.  NECK:  Thick neck,  Trachea midline.  No palpable lymphadenopathy..  ENT: Mouth and posterior oropharynx normal, moist mucous membranes, .  EYES: EOM normal, conjunctiva and lids normal.   RESP:   Diminished lung sounds bilateral lower lobes.  Crackles bilateral bases.  Old surgical scar from thyroidectomy visible at base of neck and upper chest.. Oxygen on at 1 l/min via NC O2 sat was 88% and flow rate increased to 1.5l/min and sats increased to 90%..  No cough during visit..  CV: Irregularly, irregular rate and rhythm with rapid ventricular rate of 110 - has not yet had her metformin. , 2/6 systolic murmur. +3/+4 pitting leg (to hips),ankle and pedal edema.      ABDOMEN: Large, rounded abdomen. BS's positive all 4 quadrants. No discomfort with palpation of abdomen including suprapubic area.  MUSC:  No pain with gentle ROM of upper extremities.  Mild discomfort with flexion & extension of knees.    NEURO:  Alert.  Purposeful movement of extremities without focal weakness, although legs are weaker than arms due to deconditioning. No tremors.  PSYCH:  Discussed current status. Confused and is having great difficulty following the concepts. Is calm.     Recent Labs:  CBC RESULTS:   Recent Labs   Lab Test 10/23/18 09/27/18  07/10/18   09/21/13   1455  06/01/11   1845   WBC  6.6   --    --   5.6   < >  10.9   --    RBC  4.43   --    --   4.62   < >  4.77   --    HGB  13.0  12.6   < >  12.9   < >  14.9  15.0   HCT  43.2   --    --   43.7   < >  43.1  49.2   MCV  97.5   --    --   94.6   < >  90  94.1   MCH   --    --    --    --    --   31.2  28.7   MCHC   --    --    --    --    --   34.6  30.5*   RDW  16.4*   --    --   15.9*   < >  12.7  13.7   PLT  218   --    --   202   < >  128*   --     < > = values in this interval not displayed.       Last Basic Metabolic Panel:  10/29/18:  BUN: 19, creatinine: 0.68, Na:139, K: 3.2, Chl: 83, CO2 :>45  Est GFR >60    Recent Labs   Lab Test 10/25/18 10/23/18   NA  137  134*   POTASSIUM  3.5  4.4   CHLORIDE  86*  88*   KURTIS  10.3  10.5*   CO2  39*  33*   BUN  15  17   CR  0.57  0.64   GLC  114*  116*         Lab Results   Component Value Date    A1C 6.3 09/27/2018    A1C 7.3  06/11/2018     GFR Estimate   Date Value Ref Range Status   10/25/2018 >60 >60 ml/min/1.73m2 Final   10/23/2018 >60 >60 mL/min/1.73m2 Final   09/27/2018 >60 >60 mL/min/1.73m2 Final   07/23/2018 >60 ml/min/1.73m2 Final   07/16/2018 >60 >60 mL/min/1.73m2 Final     BNP:  10/29/18: 1020  10/25/18: 989  10/23/18: 1031    Family Communication:   Contacted son Raudel . Discussed Mare's current condition as well as her decline over the past year.  Her wishes are for comfort and due to the continual exacerbations of her CHF and intolerance of tx, hospice care is recommended.  Raudel agrees.  Plan is to continue to attempt diuresis over the next three days with labs on MOnday.  If she shows no improvement, and Raudel's discussion with his family occurs, will decide at that time, if hospice care should be ordered.  Raudel expressed understanding and consent for POC.    Assessment/Plan:  (E87.4) Acid-base imbalance  (primary encounter diagnosis)  Comment: Recurrent metabolic alkalosis due to diuresis, but also has risk factors for respiratory acidosis as well  Plan: Need to give additional diuretic due to CHF today.  Will recheck BMP on Monday and if levels remain critical will consider hospice referral.    (I50.9) CHF (congestive heart failure), NYHA class III, unspecified failure chronicity, unspecified type (H)  Comment: Current exacerbation, with resulting acid base disturbance from diuresis  Plan: Zaroxolyn 2.5 mg now.  Continue with lasix daily. Reevaluate on MOnday.  BMP on Monday.  If no improvement will consider hospice referral.  discontinue MVI, ascorbic acid and vitamin D.     (I48.2) Chronic atrial fibrillation (H)  Comment: Current RVR.  Plan: Continue metoprolol and monitoring.    (E11.9) Type 2 diabetes mellitus without complication, without long-term current use of insulin (H)  Comment: Chronic  Plan: Continue metformin and monitoring.    (G31.84) Mild cognitive impairment with memory loss  Comment: Advancing due to  physical condition  Plan: Son, Raudel to assist with decision making. Continue to provide full assist to pt with all cares.     Total time spent with patient visit was 41 min including patient visit, review of past records, phone call to patient contact and discussion with [facility] [place].. Greater than 50% of total time spent with counseling and coordinating care due to CHF exacerbation.     Electronically signed by  SABINE Borjas CNP

## 2018-11-02 NOTE — PROGRESS NOTES
[place]    Chief Complaint   Patient presents with     ARNOLD     [facility] Record Number:  [id number]  [facility] where [facility] took place[facility] [[id number]]    HPI:    Mireille Taylor is a 89 year old  (8/2/1929), who is being seen today for an episodic care visit.  HPI information obtained from: facility chart records, facility staff, [facility] chart review and Pt is unreliable historian due to cognitive loss..Today's concern is:  Acid-base imbalance  Labs today show improvement from last week. Pt is clearer mentally.     CHF (congestive heart failure), NYHA class III, unspecified failure chronicity, unspecified type (H)  LE edema is slightly improved. Remains on oxygen continuously due to both CHF and reactive airway disease.     Chronic atrial fibrillation (H)  Heart rate ranges in past three days: 90-99.  No reports of chest pain.    Mild intermittent reactive airway disease with wheezing without complication  Relies on continual oxygen to maintain sats >90%, but need to balance with with her CO2 retention.     Mild cognitive impairment with memory loss  Ongoing cognitive loss, and she lacks the ability to understand the complex issues regarding her condition.  She does have a son, Raudel, who is helpful with decision making.  She no longer ambulate.  Can have delusional thoughts.    Type 2 diabetes mellitus without complication, without long-term current use of insulin (H)  Accuchecks in past three days:  0730: 120-157, 1700: 125-138.        ALLERGIES: Bactrim [sulfamethoxazole w-trimethoprim]; Gabapentin; Lyrica; Methadone; Nsaids; Penicillin g; Tramadol; Cephalexin hcl; Clindamycin hcl; and Macrobid [nitrofurantoin]  Past Medical, Surgical, Family and Social History reviewed and updated in [facility].    Current Outpatient Prescriptions   Medication Sig Dispense Refill     Acetaminophen (TYLENOL PO) Take 650 mg by mouth 4 times daily       aspirin  81 MG EC tablet Take 1 tablet (81 mg) by mouth daily 90 tablet 3     bisacodyl (DULCOLAX) 10 MG Suppository Every other day if no BM 25 suppository 1     chlorhexidine (PERIDEX) 0.12 % solution Swish and spit in mouth 2 times daily Rinse with 1/2 oz BID for 30 secs and expectorate       citalopram (CELEXA) 40 MG tablet TAKE 1 TABLET BY MOUTH DAILY 90 tablet 1     Cranberry (CRANBERRY CONCENTRATE) 500 MG CAPS Take 1 capsule (500 mg) by mouth 2 times daily 90 capsule      estradiol (ESTRACE VAGINAL) 0.1 MG/GM cream Place 2 g vaginally three times a week 42.5 g      furosemide (LASIX) 20 MG tablet Take 3 tablets (60 mg) by mouth daily 60 tablet 1     levalbuterol (XOPENEX) 0.63 MG/3ML neb solution Take 1 ampule by nebulization every 8 hours as needed for shortness of breath / dyspnea or wheezing       levalbuterol (XOPENEX) 0.63 MG/3ML neb solution Take 3 mLs (0.63 mg) by nebulization 2 times daily 360 mL      levothyroxine (SYNTHROID, LEVOTHROID) 125 MCG tablet Take 1 tablet (125 mcg) by mouth daily 90 tablet 1     LORazepam (ATIVAN) 0.5 MG tablet 0.25 mg in am and 0.5 mg at HS. 4 tablet 0     metFORMIN (GLUCOPHAGE) 500 MG tablet 750 mg in am and 500 mg pm. 180 tablet 1     methenamine hippurate (HIPREX) 1 G TABS TAKE 1 TABLET BY MOUTH ONCE A DAY.  TAKE ALONG WITH 1000MG VITAMIN-C 180 tablet 4     MetOLazone (ZAROXOLYN PO) Take 2.5 mg by mouth Give 1/2 hour before lasix if weight is >235 lbs (M-W-F)       metoprolol tartrate (LOPRESSOR) 25 MG tablet Take 1 tablet (25 mg) by mouth 2 times daily Hold for systolic BP <100 or pulse <60. 90 tablet 3     mirabegron (MYRBETRIQ) 50 MG 24 hr tablet Take 1 tablet (50 mg) by mouth daily 30 tablet 11     OxyCODONE HCl (ROXICODONE PO) Take 5 mg by mouth 2 times daily Give at 0630 and 2100. And q6h prn       pantoprazole (PROTONIX) 40 MG enteric coated tablet Take 1 tablet (40 mg) by mouth daily Take 30-60 minutes before a meal. 180 tablet 3     polyethylene glycol (MIRALAX) powder  Take 17 g (1 capful) by mouth every other day 510 g 1     polyethylene glycol (MIRALAX/GLYCOLAX) powder Take 17 g by mouth daily as needed for constipation 119 g      potassium chloride SA (K-DUR/KLOR-CON M) 10 MEQ CR tablet Take 3 tablets (30 mEq) by mouth 2 times daily 90 tablet 1     Pseudoephedrine-DM-GG (ROBITUSSIN CF PO) Take 5 mLs by mouth 4 times daily as needed        Selenium Sulfide 2.25 % SHAM Externally apply 1 Application topically twice a week       sodium chloride ([employer]S SALINE NASAL SPRAY) 0.65 % nasal spray Spray 2 sprays into both nostrils 2 times daily       traZODone (DESYREL) 50 MG tablet Take 1 tablet (50 mg) by mouth At Bedtime 30 tablet 1     [DISCONTINUED] levalbuterol (XOPENEX) 0.63 MG/3ML neb solution Take 3 mLs (0.63 mg) by nebulization every 8 hours as needed for shortness of breath / dyspnea or wheezing And bid for two weeks. (Patient not taking: Reported on 11/5/2018) 360 mL      Medications reviewed:  Medications reconciled to facility chart and changes were made to reflect current medications as identified as above med list. Below are the changes that were made:   Medications stopped since last EPIC medication reconciliation:   There are no discontinued medications.    Medications started since last [facility] medication reconciliation:  No orders of the defined types were placed in this encounter.    Patient Active Problem List   Diagnosis     GERD (gastroesophageal reflux disease)     Systolic murmur     CARDIOVASCULAR SCREENING; LDL GOAL LESS THAN 100     Osteoporosis     Peripheral neuropathy     Anxiety     Vitamin D deficiency     Mixed incontinence urge and stress     Health Care Home     Frequent UTI, Dr Bigg Muhammad, Urologist     Candidiasis of skin     Dependent edema     Benzodiazepine dependence, pt reports daily use of valium since the 1930's.     Advance Care Planning     Constipation     Type 2 diabetes mellitus without complication (H)     Postsurgical hypothyroidism,  Surgical excision in her 20's.     Chronic low back pain with bilateral sciatica     Macular degeneration (senile) of retina     Recurrent major depressive disorder, in partial remission (H)     Seborrheic dermatitis     Morbid obesity (H)     BMI 35.0-35.9,adult     Leg skin lesion, left     Chronic atrial fibrillation (H)     RAD (reactive airway disease) with wheezing     Mild cognitive impairment with memory loss     JAMES (obstructive sleep apnea)     CHF (congestive heart failure), NYHA class III, unspecified failure chronicity, unspecified type (H)     JAYME (generalized anxiety disorder)     Carbon dioxide retention     H/O breast implant, elective bilateral     Acid-base imbalance     REVIEW OF SYSTEMS:  Limited secondary to cognitive impairment but today pt reports that she is okay,     Physical Exam:  /69  Pulse 84  Temp 97.8  F (36.6  C)  Resp 18  Wt 226 lb (102.5 kg)  SpO2 94%  BMI 36.48 kg/m2  GENERAL APPEARANCE: Alert, overweight female seen. Skin warm and dry.  Pink facial color.   Able to  express self verbally. Talkative with flat and sad affect..  HEAD:  Normocephalic.  No facial asymmetry.  NECK:  Thick neck,  Trachea midline.  No palpable lymphadenopathy..  ENT: Mouth and posterior oropharynx normal, moist mucous membranes, .  EYES: EOM normal, conjunctiva and lids normal.   RESP:  Diminished lung sounds bilateral lower lobes.  Crackles bilateral bases.  Old surgical scar from thyroidectomy visible at base of neck and upper chest.. ..  No cough during visit..  CV: Irregularly, irregular rate and rhythm with rate of 88. , 2/6 systolic murmur. +3/+4 pitting leg (to hips),ankle and pedal edema.      ABDOMEN: Large, rounded abdomen. BS's positive all 4 quadrants. No discomfort with palpation of abdomen including suprapubic area.  MUSC:  No pain with gentle ROM of upper extremities.  Mild discomfort with flexion & extension of knees.    NEURO:  Alert.  Purposeful movement of extremities  without focal weakness, although legs are weaker than arms due to deconditioning. No tremors.  PSYCH:  Discussed current status. Confused and is having great difficulty following the concepts. Is calm. Her therapist is present and spending time with her.    Recent Labs:     CBC RESULTS:   Recent Labs   Lab Test 10/23/18 09/27/18 07/10/18  09/21/13   1455  06/01/11   1845   WBC  6.6   --   5.6  10.9   --    RBC  4.43   --   4.62  4.77   --    HGB  13.0  12.6  12.9  14.9  15.0   HCT  43.2   --   43.7  43.1  49.2   MCV  97.5   --   94.6  90  94.1   MCH   --    --    --   31.2  28.7   MCHC   --    --    --   34.6  30.5*   RDW  16.4*   --   15.9*  12.7  13.7   PLT  218   --   202  128*   --        Last Basic Metabolic Panel:  11/5/18: BUN: 18, creat: 0.70, est GFR >60    Sodium: 136, K: 5.0, Chl: 90, CO2: 38   Recent Labs   Lab Test 11/05/18 10/29/18   NA  136  139   POTASSIUM  5.0  3.2*   CHLORIDE  90*  83*   KURTIS  10.4  10.2   CO2  38*  >45   BUN  18  19   CR  0.70  0.68   GLC  107*  122*       Liver Function Studies -   Recent Labs   Lab Test  05/23/14   1510  12/13/13   1305   PROTTOTAL  7.0  7.0   ALBUMIN  4.3  3.8   BILITOTAL  0.9  1.0   ALKPHOS  79  85   AST  24  35   ALT  39  46       TSH   Date Value Ref Range Status   02/05/2018 2.94 0.30 - 4.50 uIU/mL Final   02/02/2017 0.60 0.20 - 4.50 mcU/mL Final   02/02/2017 0.60 0.20 - 4.50 ulU/ml Final   ]    Lab Results   Component Value Date    A1C 6.3 09/27/2018    A1C 7.3 06/11/2018     Family Communication:  Son Raudel contacted and discussed his mother's improvement, but overall remaining concern about her CHF.  Will hold on hospice care at this time and will initiate it in the future when improvement is no longer likely.  He agreed with this as did his mother.     Assessment/Plan:  (E87.4) Acid-base imbalance  (primary encounter diagnosis)  Comment: Improved  Plan: Continue POC and recheck in two weeks.    (I50.9) CHF (congestive heart failure), NYHA class III,  unspecified failure chronicity, unspecified type (H)  Comment: Chronic, with difficulty maintaining balance of chloride and CO2.  Plan: Continue current dose of lasix with occasional use of zaroxolyn.  Consider hospice care in the near future.    (I48.2) Chronic atrial fibrillation (H)  Comment: Chronic  Plan: Continue current POC>    (J45.20) Mild intermittent reactive airway disease with wheezing without complication  Comment: Chronic  Plan:  Continue current POC.  Limit oxygen flow rate due to CO2 retention.    (G31.84) Mild cognitive impairment with memory loss  Comment: Chronic  Plan: COntinue to support with expected further cognitive loss.  Comfort is goal of tx.    (E11.9) Type 2 diabetes mellitus without complication, without long-term current use of insulin (H)  Comment: Chronic, but controlled with metformin.  Plan: Since appetite is declining, continue to monitor carefully.  Will likely need further reduction of her metformin.    Total time spent with patient visit was 37 min including patient visit, review of past records and phone call to patient contact. Greater than 50% of total time spent with counseling and coordinating care due to CHF and acid/base imbalance..     Electronically signed by  SABINE Borjas CNP

## 2018-11-19 PROBLEM — R53.81 DECLINING FUNCTIONAL STATUS: Status: ACTIVE | Noted: 2018-01-01

## 2018-11-19 NOTE — PROGRESS NOTES
"[facility]    Chief Complaint   Patient presents with     ARNOLD     [facility] Record Number:  [id number]  Place of Service where encounter took place:  [facility] [[id number]]      HPI:    Mireille Taylor is a 89 year old  (8/2/1929), who is being seen today for an episodic care visit.  HPI information obtained from: facility chart records, facility staff and [facility] Epic chart review. Pt is unreliable historian due to cognitive loss. Today's concern is:  Type 2 diabetes mellitus without complication, without long-term current use of insulin (H)  Accuchecks in past week: 0730: 120-164, 1700: 124-222.  Metformin dose decreased on 10/22 due to decreasing intake.    Chronic atrial fibrillation (H)  Heart rates reported as  last evening.  No reports of chest pain.    CHF (congestive heart failure), NYHA class III, unspecified failure chronicity, unspecified type (H)  Received one dose of zaroxolyn on 11/2 due to increase in LE edema.  Has been on usual dose of lasix and KCL since then.  Today, her K+ is reported as 5.9.  No reports of changes in LE edema or SOB or weight.  Has O2 chronically at low flow.  Is a CO2 retainer. Comfort is goal of all interventions.    Declining functional status  Nsg is reporting that pt is needing more assistance at meals.  Will look at her food and seem to not know what to do.  Today, I sat with the pt at lunch and she claims to have eaten \"tons\", but very little was gone from her plate.  Took a bit of celery with humus from examiner and no obvious difficulty with chewing and swallowing. After lunch, I visited with her in her room and she took the mighty shake on ice and stated that it was very good.    Acid-base imbalance  Labs on 11/5 showed a low chloride of 90 and an elevated CO2 of 38.  Today chloride and CO2 within normal range.      ALLERGIES: Bactrim [sulfamethoxazole w-trimethoprim]; Gabapentin; Lyrica; Methadone; Nsaids; " Penicillin g; Tramadol; Cephalexin hcl; Clindamycin hcl; and Macrobid [nitrofurantoin]  Past Medical, Surgical, Family and Social History reviewed and updated in EPIC.    Current Outpatient Prescriptions   Medication Sig Dispense Refill     Acetaminophen (TYLENOL PO) Take 650 mg by mouth 4 times daily       aspirin 81 MG EC tablet Take 1 tablet (81 mg) by mouth daily 90 tablet 3     bisacodyl (DULCOLAX) 10 MG Suppository Every other day if no BM 25 suppository 1     chlorhexidine (PERIDEX) 0.12 % solution Swish and spit in mouth 2 times daily Rinse with 1/2 oz BID for 30 secs and expectorate       citalopram (CELEXA) 40 MG tablet TAKE 1 TABLET BY MOUTH DAILY 90 tablet 1     Cranberry (CRANBERRY CONCENTRATE) 500 MG CAPS Take 1 capsule (500 mg) by mouth 2 times daily 90 capsule      estradiol (ESTRACE VAGINAL) 0.1 MG/GM cream Place 2 g vaginally three times a week 42.5 g      furosemide (LASIX) 20 MG tablet Take 3 tablets (60 mg) by mouth daily 60 tablet 1     levalbuterol (XOPENEX) 0.63 MG/3ML neb solution Take 1 ampule by nebulization every 8 hours as needed for shortness of breath / dyspnea or wheezing       levalbuterol (XOPENEX) 0.63 MG/3ML neb solution Take 3 mLs (0.63 mg) by nebulization 2 times daily 360 mL      levothyroxine (SYNTHROID, LEVOTHROID) 125 MCG tablet Take 1 tablet (125 mcg) by mouth daily 90 tablet 1     LORazepam (ATIVAN) 0.5 MG tablet 0.25 mg in am and 0.5 mg at HS. 4 tablet 0     metFORMIN (GLUCOPHAGE) 500 MG tablet 750 mg in am and 500 mg pm. 180 tablet 1     methenamine hippurate (HIPREX) 1 G TABS TAKE 1 TABLET BY MOUTH ONCE A DAY.  TAKE ALONG WITH 1000MG VITAMIN-C 180 tablet 4     MetOLazone (ZAROXOLYN PO) Take 2.5 mg by mouth Give 1/2 hour before lasix if weight is >235 lbs (M-W-F)       metoprolol tartrate (LOPRESSOR) 25 MG tablet Take 1 tablet (25 mg) by mouth 2 times daily Hold for systolic BP <100 or pulse <60. 90 tablet 3     mirabegron (MYRBETRIQ) 50 MG 24 hr tablet Take 1 tablet (50  mg) by mouth daily 30 tablet 11     OxyCODONE HCl (ROXICODONE PO) Take 5 mg by mouth 2 times daily Give at 0630 and 2100. And q6h prn       pantoprazole (PROTONIX) 40 MG enteric coated tablet Take 1 tablet (40 mg) by mouth daily Take 30-60 minutes before a meal. 180 tablet 3     polyethylene glycol (MIRALAX) powder Take 17 g (1 capful) by mouth every other day 510 g 1     polyethylene glycol (MIRALAX/GLYCOLAX) powder Take 17 g by mouth daily as needed for constipation 119 g      potassium chloride SA (K-DUR/KLOR-CON M) 10 MEQ CR tablet Take 3 tablets (30 mEq) by mouth 2 times daily 90 tablet 1     Pseudoephedrine-DM-GG (ROBITUSSIN CF PO) Take 5 mLs by mouth 4 times daily as needed        Selenium Sulfide 2.25 % SHAM Externally apply 1 Application topically twice a week       sodium chloride ([employer] SALINE NASAL SPRAY) 0.65 % nasal spray Spray 2 sprays into both nostrils 2 times daily       traZODone (DESYREL) 50 MG tablet Take 1 tablet (50 mg) by mouth At Bedtime 30 tablet 1     Medications reviewed:  Medications reconciled to facility chart and changes were made to reflect current medications as identified as above med list. Below are the changes that were made:   Medications stopped since last EPIC medication reconciliation:   There are no discontinued medications.    Medications started since last [facility] medication reconciliation:  No orders of the defined types were placed in this encounter.    Patient Active Problem List   Diagnosis     GERD (gastroesophageal reflux disease)     Systolic murmur     CARDIOVASCULAR SCREENING; LDL GOAL LESS THAN 100     Osteoporosis     Peripheral neuropathy     Anxiety     Vitamin D deficiency     Mixed incontinence urge and stress     Health Care Home     Frequent UTI, Dr Bigg Muhammad, Urologist     Candidiasis of skin     Dependent edema     Benzodiazepine dependence, pt reports daily use of valium since the 1930's.     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     Chronic low back pain with bilateral sciatica     Macular degeneration (senile) of retina     Recurrent major depressive disorder, in partial remission (H)     Seborrheic dermatitis     Morbid obesity (H)     BMI 35.0-35.9,adult     Leg skin lesion, left     Chronic atrial fibrillation (H)     RAD (reactive airway disease) with wheezing     Mild cognitive impairment with memory loss     JAMES (obstructive sleep apnea)     CHF (congestive heart failure), NYHA class III, unspecified failure chronicity, unspecified type (H)     JAYME (generalized anxiety disorder)     Carbon dioxide retention     H/O breast implant, elective bilateral     Acid-base imbalance     Declining functional status       REVIEW OF SYSTEMS:  Limited secondary to cognitive impairment but today pt reports that she is okay.    Physical Exam:  /78  Pulse 61  Temp 97.6  F (36.4  C)  Resp 18  Ht 5' 6\" (1.676 m)  Wt 223 lb (101.2 kg)  BMI 35.99 kg/m2  GENERAL APPEARANCE: Alert, overweight female seen. Skin warm and dry.  Pink facial color.   Able to  express self verbally. Talkative with flat affect..  HEAD:  Normocephalic.  No facial asymmetry.  NECK:  Thick neck,  Trachea midline.  No palpable lymphadenopathy..  ENT: Mouth and posterior oropharynx normal, moist mucous membranes, .  EYES: EOM normal, conjunctiva and lids normal.   RESP:  Diminished lung sounds bilateral lower lobes.  Crackles bilateral bases.  Old surgical scar from thyroidectomy visible at base of neck and upper chest.. ..  No cough during visit..  CV: Irregularly, irregular rate and rhythm with rate of 92. , 2/6 systolic murmur. +3/+4 pitting leg (to hips),ankle and pedal edema.      ABDOMEN: Large, rounded abdomen. BS's positive all 4 quadrants. No discomfort with palpation of abdomen including suprapubic area.  MUSC:  No pain with gentle ROM of upper extremities.  Mild " discomfort with flexion & extension of knees.    NEURO:  Alert.  Purposeful movement of extremities without focal weakness, although legs are weaker than arms due to deconditioning. No tremors.  PSYCH:  Discussed current status. Confused and is having great difficulty following the concepts. Is calm. Accepted mighty shake and had a delayed swallow, but no cough.  Took entire mightly shake.    Recent Labs:   CBC RESULTS:   Recent Labs   Lab Test 10/23/18 09/27/18 07/10/18  09/21/13   1455  06/01/11   1845   WBC  6.6   --   5.6  10.9   --    RBC  4.43   --   4.62  4.77   --    HGB  13.0  12.6  12.9  14.9  15.0   HCT  43.2   --   43.7  43.1  49.2   MCV  97.5   --   94.6  90  94.1   MCH   --    --    --   31.2  28.7   MCHC   --    --    --   34.6  30.5*   RDW  16.4*   --   15.9*  12.7  13.7   PLT  218   --   202  128*   --        Last Basic Metabolic Panel:  11/19/18:  BUN: 15, creat: 0.54, est GFR >60, Na: 135, K: 5.9, Chl: 99, CO2: 30, Glucose: 121, Calcium: 10.1  Recent Labs   Lab Test 11/05/18 10/29/18   NA  136  139   POTASSIUM  5.0  3.2*   CHLORIDE  90*  83*   KURTIS  10.4  10.2   CO2  38*  >45   BUN  18 19   CR  0.70  0.68   GLC  107*  122*     GFR Estimate   Date Value Ref Range Status   11/19/2018 >60 >60 ml/min/1.73m2 Final   11/05/2018 >60 >60 ml/min/1.73m2 Final   10/29/2018 >60 >60 ml/min/1.73m2 Final   10/25/2018 >60 >60 ml/min/1.73m2 Final   10/23/2018 >60 >60 mL/min/1.73m2 Final       TSH   Date Value Ref Range Status   02/05/2018 2.94 0.30 - 4.50 uIU/mL Final   02/02/2017 0.60 0.20 - 4.50 mcU/mL Final   02/02/2017 0.60 0.20 - 4.50 ulU/ml Final       Lab Results   Component Value Date    A1C 6.3 09/27/2018    A1C 7.3 06/11/2018     Family communication:  Son Raudel contacted.  Discussed current status and her decline in oral intake.  Unsure if she is aware of what to do with the food.  He indicates that she has always been a good eater, which the staff agree with, until recently and intake seems to have  really declined. Will continue to monitor and look to hospice care if she continues to not eat.  He agrees.  He states that she is very upset with not being able to go out for Thanksgiving.  Due to her disability, I would not see how the family could get her out and her son is planning to come and spend time with her.    Assessment/Plan:  (E11.9) Type 2 diabetes mellitus without complication, without long-term current use of insulin (H)  (primary encounter diagnosis)  Comment: Chronic, controlled with metformin. HgbA1C goal <9%, at goal.  Plan: Continue current dose of metformin and current schedule of monitoring of accuchecks.  Will likely need less metformin if her intake declines.  Check HgbA1C q3m . Check HgbA1C in 3 weeks.     (I48.2) Chronic atrial fibrillation (H)  Comment: Rate controlled majority of time  Plan: Continue current POC.  Is not a coumadin candidate due to poor intake.  Continue ASA.    (I50.9) CHF (congestive heart failure), NYHA class III, unspecified failure chronicity, unspecified type (H)  Comment: Chronic  Plan: Hold potassium this afternoon.  Starting tomorrow start KCL 20 meq bid and check potassium on Wednesday.    (R53.81) Declining functional status  Comment: Ongoing  Plan: Continue to monitor and start hospice care as indicated by decline    (E87.4) Acid-base imbalance  Comment: Improved today  Plan: BMP in 3 weeks.     Total time spent with patient visit was 39 min including patient visit, review of past records and phone call to patient contact. Greater than 50% of total time spent with counseling and coordinating care due to ongoing decline.     Electronically signed by  SABINE Borjas CNP

## 2018-12-13 NOTE — PROGRESS NOTES
[facility]    Chief Complaint   Patient presents with     correction Regulatory       [facility] Record Number:  [id number]  Place of Service where encounter took place:  [facility] [[id number]]    HPI:    Mireille Taylor is a 89 year old  (8/2/1929), who is being seen today for a federally mandated E/M visit.  HPI information obtained from: facility chart records, facility staff and [facility] chart review. Pt is unreliable historian due to cognitive loss. Today's concerns are:  CHF (congestive heart failure), NYHA class III, unspecified failure chronicity, unspecified type (H)  Weight is stable.  No recent change to lasix dose.  Continues to decline and at times does not have the energy to get out of bed. BP ranges in past two weeks: 101/62 - 120/82.  Nsg reports that pt was not given her lasix 4 times in past 10 days due to lack of supply from pharmacy.  Has received it every day starting 12/10.     Type 2 diabetes mellitus without complication, without long-term current use of insulin (H)  Remains on metformin bid.  Appetite declining. Accuchecks last two weeks:  0730: 115-153, 1700: 109-121.  Did have one recorded as 220, but unclear if that was a typo or a real value.     Chronic atrial fibrillation (H)  Pulse ranges  before med.     Declining functional status  Has become more bedbound with decline.  Requires alexandro lift for transfers. Appetite decreasing and needs many more cues. Nursing staff now Non ambulatory and when up is in w/c.  No longer able to use electric w/c due to her cognitive loss and safety issues. Family notes that she is much more confused.    Moderate persistent reactive airway disease without complication  On continual oxygen via NC.  O2 sats >90% on oxygen but declines to <90% when off oxygen..    Acid-base imbalance  Frequent critical elevation of CO2 and low chloride levels, but labs on 1210 showed only mild changes for both with  a chloride of 94 and a CO2 of 32.    Failure to thrive in adult  Nsg and family note overall decline in cognitive and functional status.    ALLERGIES: Bactrim [sulfamethoxazole w-trimethoprim]; Gabapentin; Lyrica; Methadone; Nsaids; Penicillin g; Tramadol; Cephalexin hcl; Clindamycin hcl; and Macrobid [nitrofurantoin]  PAST MEDICAL HISTORY:  has a past medical history of Advanced directives, counseling/discussion, POLST completed 8/27/15, DNR/DNI, No tube feedings, No dialysis,  Do not hospitalize unless comfort can be improved.  OK for IV's and antibiotics (8/27/2015), Agoraphobia without mention of panic attacks, Benzodiazepine dependence, pt reports daily use of valium since the 1930's. (8/27/2015), Carbon dioxide retention (8/14/2018), CHF (congestive heart failure), NYHA class III, unspecified failure chronicity, unspecified type (H) (5/7/2018), Chronic atrial fibrillation (H) (10/20/2017), Chronic low back pain with bilateral sciatica (6/3/2016), Constipation (8/27/2015), Degenerative disc disease, Dependent edema (8/27/2015), Depressive disorder, not elsewhere classified, Esophageal reflux, JAYME (generalized anxiety disorder) (6/6/2018), Hypothyroidism, Leg skin lesion, left (8/18/2017), Macular degeneration (senile) of retina (8/23/2016), Mild cognitive impairment with memory loss (4/10/2018), Nasal bleeding (1/28/2016), Obstructive sleep apnea (adult) (pediatric), Postsurgical hypothyroidism, Surgical excision in her 20's. (1/28/2016), Reactive airway disease without complication (11/6/2017), Recurrent major depressive disorder, in partial remission (H) (12/6/2016), Seborrheic dermatitis (4/12/2017), Squamous cell carcinoma of skin of upper limb, including shoulder, Type 2 diabetes mellitus without complication (H) (12/16/2015), Unspecified curvature of spine, Unspecified essential hypertension, and Unspecified vitamin D deficiency.  PAST SURGICAL HISTORY:  has a past surgical history that includes Abdomen  surgery (1940); hip surgery (2010); Foot surgery (2000); Hysterectomy vaginal (1980'S); Breast surgery (1970'S); Eye surgery (2003); Abdomen surgery (1990's); Head and neck surgery (1960's); knee surgery (9/2004, 11/2007); implant back (4/7/2009); and Implant stimulator and leads sacral nerve (stage one and two) (2/16/2015 2005).  FAMILY HISTORY: family history includes Family History Negative in her son, son, son, and son; Heart Disease in her father and mother.  SOCIAL HISTORY:  reports that she quit smoking about 58 years ago. Her smoking use included cigarettes. She has a 24.00 pack-year smoking history. she has never used smokeless tobacco. She reports that she uses drugs. She reports that she does not drink alcohol.    MEDICATIONS:  Current Outpatient Medications   Medication Sig Dispense Refill     Acetaminophen (TYLENOL PO) Take 650 mg by mouth 4 times daily       aspirin 81 MG EC tablet Take 1 tablet (81 mg) by mouth daily 90 tablet 3     bisacodyl (DULCOLAX) 10 MG Suppository Every other day if no BM 25 suppository 1     chlorhexidine (PERIDEX) 0.12 % solution Swish and spit in mouth 2 times daily Rinse with 1/2 oz BID for 30 secs and expectorate       citalopram (CELEXA) 40 MG tablet TAKE 1 TABLET BY MOUTH DAILY 90 tablet 1     Cranberry (CRANBERRY CONCENTRATE) 500 MG CAPS Take 1 capsule (500 mg) by mouth 2 times daily 90 capsule      estradiol (ESTRACE VAGINAL) 0.1 MG/GM cream Place 2 g vaginally three times a week 42.5 g      furosemide (LASIX) 20 MG tablet Take 3 tablets (60 mg) by mouth daily 60 tablet 1     levalbuterol (XOPENEX) 0.63 MG/3ML neb solution Take 3 mLs (0.63 mg) by nebulization 2 times daily 360 mL      levothyroxine (SYNTHROID, LEVOTHROID) 125 MCG tablet Take 1 tablet (125 mcg) by mouth daily 90 tablet 1     LORazepam (ATIVAN) 0.5 MG tablet 0.25 mg in am and 0.5 mg at HS. 4 tablet 0     metFORMIN (GLUCOPHAGE) 500 MG tablet 750 mg in am and 500 mg pm. 180 tablet 1     methenamine  hippurate (HIPREX) 1 G TABS TAKE 1 TABLET BY MOUTH ONCE A DAY.  TAKE ALONG WITH 1000MG VITAMIN-C 180 tablet 4     MetOLazone (ZAROXOLYN PO) Take 2.5 mg by mouth Give 1/2 hour before lasix if weight is >235 lbs (M-W-F)       metoprolol tartrate (LOPRESSOR) 25 MG tablet Take 1 tablet (25 mg) by mouth 2 times daily Hold for systolic BP <100 or pulse <60. 90 tablet 3     mirabegron (MYRBETRIQ) 50 MG 24 hr tablet Take 1 tablet (50 mg) by mouth daily 30 tablet 11     OxyCODONE HCl (ROXICODONE PO) Take 5 mg by mouth 2 times daily Give at 0630 and 2100. And q6h prn       pantoprazole (PROTONIX) 40 MG enteric coated tablet Take 1 tablet (40 mg) by mouth daily Take 30-60 minutes before a meal. 180 tablet 3     polyethylene glycol (MIRALAX) powder Take 17 g (1 capful) by mouth every other day 510 g 1     polyethylene glycol (MIRALAX/GLYCOLAX) powder Take 17 g by mouth daily as needed for constipation 119 g      potassium chloride SA (K-DUR/KLOR-CON M) 10 MEQ CR tablet Take 2 tablets (20 mEq) by mouth 2 times daily 90 tablet 1     Pseudoephedrine-DM-GG (ROBITUSSIN CF PO) Take 5 mLs by mouth 4 times daily as needed        Selenium Sulfide 2.25 % SHAM Externally apply 1 Application topically twice a week       sodium chloride ([employer] SALINE NASAL SPRAY) 0.65 % nasal spray Spray 2 sprays into both nostrils 2 times daily       traZODone (DESYREL) 50 MG tablet Take 1 tablet (50 mg) by mouth At Bedtime 30 tablet 1     Medications reviewed:  Medications reconciled to facility chart and changes were made to reflect current medications as identified as above med list. Below are the changes that were made:   Medications stopped since last EPIC medication reconciliation:   There are no discontinued medications.    Medications started since last [facility] medication reconciliation:  No orders of the defined types were placed in this encounter.    Patient Active Problem List   Diagnosis     GERD (gastroesophageal reflux disease)     Systolic murmur  "    CARDIOVASCULAR SCREENING; LDL GOAL LESS THAN 100     Osteoporosis     Peripheral neuropathy     Anxiety     Vitamin D deficiency     Mixed incontinence urge and stress     Health Care Home     Frequent UTI, Dr Bigg Muhammad, Urologist     Candidiasis of skin     Dependent edema     Benzodiazepine dependence, pt reports daily use of valium since the 1930's.     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     Chronic low back pain with bilateral sciatica     Macular degeneration (senile) of retina     Recurrent major depressive disorder, in partial remission (H)     Seborrheic dermatitis     Morbid obesity (H)     BMI 35.0-35.9,adult     Leg skin lesion, left     Chronic atrial fibrillation (H)     RAD (reactive airway disease) with wheezing     Mild cognitive impairment with memory loss     JAMES (obstructive sleep apnea)     CHF (congestive heart failure), NYHA class III, unspecified failure chronicity, unspecified type (H)     JAYME (generalized anxiety disorder)     Carbon dioxide retention     H/O breast implant, elective bilateral     Acid-base imbalance     Declining functional status       Case Management:  I have reviewed the care plan and MDS and do agree with the plan. Patient's desire to return to the community is not assessible due to cognitive impairment.  Information reviewed:  Medications, vital signs, orders, and nursing notes.    ROS:  Limited secondary to cognitive impairment but today pt reports that she is \"confused\".    Exam:  Vitals: /72   Pulse 121   Temp 97.6  F (36.4  C)   Resp 20   Ht 1.676 m (5' 6\")   Wt 103.4 kg (228 lb)   BMI 36.80 kg/m    BMI= Body mass index is 36.8 kg/m .  GENERAL APPEARANCE: Alert, but weak female seen. Skin warm and dry.  Pink facial color.   Able to  express self verbally. Talkative with flat affect. States she is confused..  HEAD: "  Normocephalic.  No facial asymmetry.  ENT: Mouth and posterior oropharynx normal, moist mucous membranes, .  EYES: EOM normal, conjunctiva and lids normal.   RESP:  Diminished lung sounds bilateral lower lobes.  Crackles bilateral bases.  Old surgical scar from thyroidectomy visible at base of neck and upper chest.. ..  No cough during visit..  CV: Irregularly, irregular rate and rhythm with rate of 98 , 2/6 systolic murmur. +3/+4 pitting leg (to hips),ankle and pedal edema.      ABDOMEN: Large, rounded abdomen. BS's positive all 4 quadrants. No discomfort with palpation of abdomen including suprapubic area.  MUSC:  No pain with gentle ROM of upper extremities.  Mild discomfort with flexion & extension of knees.    NEURO:  Alert, leaning to left side in w/c.  Purposeful movement of extremities without focal weakness, although legs are weaker than arms due to deconditioning and overall much weaker than one month ago.. No tremors.  PSYCH:  Discussed current status. Confused and is having great difficulty following the concepts. Is calm.     Lab/Diagnostic data:   CBC RESULTS:   Recent Labs   Lab Test 10/23/18 09/27/18 07/10/18 09/21/13  1455 06/01/11  1845   WBC 6.6  --  5.6 10.9  --    RBC 4.43  --  4.62 4.77  --    HGB 13.0 12.6 12.9 14.9 15.0   HCT 43.2  --  43.7 43.1 49.2   MCV 97.5  --  94.6 90 94.1   MCH  --   --   --  31.2 28.7   MCHC  --   --   --  34.6 30.5*   RDW 16.4*  --  15.9* 12.7 13.7     --  202 128*  --        Last Basic Metabolic Panel:  Recent Labs   Lab Test 12/10/18 11/21/18 11/19/18 11/05/18   *  --  135* 136   POTASSIUM 5.2 5.2 5.9* 5.0   CHLORIDE 94*  --  99 90*   KURTIS  --   --  10.1 10.4   CO2 32*  --  30 38*   BUN  --   --  15 18   CR  --   --  0.54* 0.70   GLC  --   --  121* 107*     GFR Estimate   Date Value Ref Range Status   11/19/2018 >60 >60 ml/min/1.73m2 Final   11/05/2018 >60 >60 ml/min/1.73m2 Final   10/29/2018 >60 >60 ml/min/1.73m2 Final   10/25/2018 >60 >60  ml/min/1.73m2 Final   10/23/2018 >60 >60 mL/min/1.73m2 Final       Lab Results   Component Value Date    A1C 6.3 09/27/2018    A1C 7.3 06/11/2018     Family Communication:  Discussed mother's status with son Raudel.  Given her ongoing decline, he agrees that hospice services are appropriate.  He has no personal opinion about which hospice unit to evaluate her and will defer this decision to the management team at [facility].  His brother Andre is in town until the first of the year and Raudel feels that he can be allowed to meet with the hospice team and participate in decision making.     ASSESSMENT/PLAN  (I50.9) CHF (congestive heart failure), NYHA class III, unspecified failure chronicity, unspecified type (H)  (primary encounter diagnosis)  Comment: Advancing with increased decline in condition.  Comfort is goal of all tx.  Plan: Continue current dose of lasix.  Decrease KCL to once a day.  Discontinue vitamin C and Vitamin D.  Refer to hospice care.    (E11.9) Type 2 diabetes mellitus without complication, without long-term current use of insulin (H)  Comment: Chronic with decreasing need for medication given her decreased appetite  Plan: Decrease metformin to 500mg bid.  Continue with monitoring of accuchecks for now, but as she continues to decline, will decrease these as well.    (I48.2) Chronic atrial fibrillation (H)  Comment: Chronic with occasional RVR.  Plan: Continue metoprolol and ASA. Monitor for comfort.    (R53.81) Declining functional status  Comment: Ongoing secondary to multiple advancing chronic conditions.  Plan: Refer for hospice care.    (J45.40) Moderate persistent reactive airway disease without complication  Comment: Chronic  Plan: Continue oxygen and nebs.  Refer to hospice care.    (E87.4) Acid-base imbalance  Comment: Chronic secondary to respiratory acidosis  Plan: Continue oxygen and comfort cares.  No further monitoring of labs when on hospice care.    (R62.7) Failure to thrive in  adult  Comment: Ongoing secondary to multiple advancing chronic conditions  Plan: Refer to hospice care.    Total time spent with patient visit at the skilled nursing facility was 42 min including patient visit, review of past records and phone call to patient contact. Greater than 50% of total time spent with counseling and coordinating care due to declining status    Electronically signed by:  SABINE Borjas CNP

## 2018-12-17 NOTE — PROGRESS NOTES
[facility]    Chief Complaint   Patient presents with     ARN[facility]       [facility] Record Number:  [id number]  Place of Service where encounter took place:  [facility] [[id number]]    HPI:    Mireille Taylor is a 89 year old  (8/2/1929), who is being seen today for an episodic care visit.  HPI information obtained from: facility chart records, facility staff and [facility] chart review. Pt is unreliable historian due to cognitive loss.    Today's concern is:  CHF (congestive heart failure), NYHA class III, unspecified failure chronicity, unspecified type (H)  Weight today reflects 10 lb weight loss, and this weight has been confirmed by [facility] staff.  Unfortunately, she continues with edema of arms and legs into her hips.  Nsg notes that she is cooperative with staff feeding her.  O2 sats >90% on 1 l/min of oxygen.     Type 2 diabetes mellitus without complication, without long-term current use of insulin (H)  Accuchecks past 5 days: 0730: 108-135, 1700: 108-183.      Chronic atrial fibrillation (H)  Heart rates in past 5 days: 64-98. No reports of chest pain.    Declining functional status  Relies on staff for all cares. Mechanical lift for transfers.  Hospice team meeting with family tomorrow to consider enrollment.    Failure to thrive in adult  Ongoing decline with multiple comorbidities.  Comfort is goal of tx.    ALLERGIES: Bactrim [sulfamethoxazole w-trimethoprim]; Gabapentin; Lyrica; Methadone; Nsaids; Penicillin g; Tramadol; Cephalexin hcl; Clindamycin hcl; and Macrobid [nitrofurantoin]  Past Medical, Surgical, Family and Social History reviewed and updated in [facility].    Current Outpatient Medications   Medication Sig Dispense Refill     Acetaminophen (TYLENOL PO) Take 650 mg by mouth 4 times daily       aspirin 81 MG EC tablet Take 1 tablet (81 mg) by mouth daily 90 tablet 3     bisacodyl (DULCOLAX) 10 MG Suppository Every other day if no BM 25 suppository 1      chlorhexidine (PERIDEX) 0.12 % solution Swish and spit in mouth 2 times daily Rinse with 1/2 oz BID for 30 secs and expectorate       citalopram (CELEXA) 40 MG tablet TAKE 1 TABLET BY MOUTH DAILY 90 tablet 1     Cranberry (CRANBERRY CONCENTRATE) 500 MG CAPS Take 1 capsule (500 mg) by mouth 2 times daily 90 capsule      estradiol (ESTRACE VAGINAL) 0.1 MG/GM cream Place 2 g vaginally three times a week 42.5 g      furosemide (LASIX) 20 MG tablet Take 3 tablets (60 mg) by mouth daily 60 tablet 1     levalbuterol (XOPENEX) 0.63 MG/3ML neb solution Take 3 mLs (0.63 mg) by nebulization 2 times daily 360 mL      levothyroxine (SYNTHROID, LEVOTHROID) 125 MCG tablet Take 1 tablet (125 mcg) by mouth daily 90 tablet 1     LORazepam (ATIVAN) 0.5 MG tablet 0.25 mg in am and 0.5 mg at HS. 4 tablet 0     metFORMIN (GLUCOPHAGE) 500 MG tablet 500mg bid with meals. 180 tablet 1     methenamine hippurate (HIPREX) 1 G TABS TAKE 1 TABLET BY MOUTH ONCE A DAY.  TAKE ALONG WITH 1000MG VITAMIN-C 180 tablet 4     MetOLazone (ZAROXOLYN PO) Take 2.5 mg by mouth Give 1/2 hour before lasix if weight is >235 lbs (M-W-F)       metoprolol tartrate (LOPRESSOR) 25 MG tablet Take 1 tablet (25 mg) by mouth 2 times daily Hold for systolic BP <100 or pulse <60. 90 tablet 3     mirabegron (MYRBETRIQ) 50 MG 24 hr tablet Take 1 tablet (50 mg) by mouth daily 30 tablet 11     OxyCODONE HCl (ROXICODONE PO) Take 5 mg by mouth 2 times daily Give at 0630 and 2100. And q6h prn       pantoprazole (PROTONIX) 40 MG enteric coated tablet Take 1 tablet (40 mg) by mouth daily Take 30-60 minutes before a meal. 180 tablet 3     polyethylene glycol (MIRALAX) powder Take 17 g (1 capful) by mouth every other day 510 g 1     polyethylene glycol (MIRALAX/GLYCOLAX) powder Take 17 g by mouth daily as needed for constipation 119 g      potassium chloride ER (K-DUR/KLOR-CON M) 10 MEQ CR tablet Take 2 tablets (20 mEq) by mouth daily 90 tablet 1     Pseudoephedrine-DM-GG (ROBITUSSIN  CF PO) Take 5 mLs by mouth 4 times daily as needed        Selenium Sulfide 2.25 % SHAM Externally apply 1 Application topically twice a week       sodium chloride ([employer]S SALINE NASAL SPRAY) 0.65 % nasal spray Spray 2 sprays into both nostrils 2 times daily       traZODone (DESYREL) 50 MG tablet Take 1 tablet (50 mg) by mouth At Bedtime 30 tablet 1     Medications reviewed:  Medications reconciled to facility chart and changes were made to reflect current medications as identified as above med list. Below are the changes that were made:   Medications stopped since last EPIC medication reconciliation:   There are no discontinued medications.    Medications started since last [facility] medication reconciliation:  No orders of the defined types were placed in this encounter.    Patient Active Problem List   Diagnosis     GERD (gastroesophageal reflux disease)     Systolic murmur     CARDIOVASCULAR SCREENING; LDL GOAL LESS THAN 100     Osteoporosis     Peripheral neuropathy     Anxiety     Vitamin D deficiency     Mixed incontinence urge and stress     Health Care Home     Frequent UTI, Dr Bigg Muhammad, Urologist     Candidiasis of skin     Dependent edema     Benzodiazepine dependence, pt reports daily use of valium since the 1930's.     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     Chronic low back pain with bilateral sciatica     Macular degeneration (senile) of retina     Recurrent major depressive disorder, in partial remission (H)     Seborrheic dermatitis     Morbid obesity (H)     BMI 35.0-35.9,adult     Leg skin lesion, left     Chronic atrial fibrillation (H)     RAD (reactive airway disease) with wheezing     Mild cognitive impairment with memory loss     JAMES (obstructive sleep apnea)     CHF (congestive heart failure), NYHA class III, unspecified failure chronicity, unspecified type (H)     JAYME  "(generalized anxiety disorder)     Carbon dioxide retention     H/O breast implant, elective bilateral     Acid-base imbalance     Declining functional status     REVIEW OF SYSTEMS:  Limited secondary to cognitive impairment but today pt reports that she is \"confused\".    Physical Exam:  /72   Pulse 80   Temp 97.3  F (36.3  C)   Resp 18   Ht 1.676 m (5' 6\")   Wt 103 kg (227 lb)   BMI 36.64 kg/m    GENERAL APPEARANCE: Alert, but weak female seen. Skin warm and dry.  Pink facial color.   Able to  express self verbally. Talkative with flat affect. States she is confused..  HEAD:  Normocephalic.  No facial asymmetry.  ENT: Mouth and posterior oropharynx normal, moist mucous membranes, .  EYES: EOM normal, conjunctiva and lids normal.   RESP:  Diminished lung sounds bilateral lower lobes.  Crackles bilateral bases.  Old surgical scar from thyroidectomy visible at base of neck and upper chest.. ..  No cough during visit..  CV: Irregularly, irregular rate and rhythm with rate of 80 , 2/6 systolic murmur. +3/+4 pitting leg (to hips),ankle and pedal edema as well as pitting edema of both arms and hands. .      ABDOMEN: Large, rounded abdomen. BS's positive all 4 quadrants. No discomfort with palpation of abdomen including suprapubic area.  MUSC:  No pain with gentle ROM of upper extremities.  Mild discomfort with flexion & extension of knees.    NEURO:  Alert,and resting in bed.  Purposeful movement of extremities without focal weakness, although legs are weaker than arms due to deconditioning and overall much weaker than one month ago.. No tremors.  PSYCH:  Discussed current status. Confused and is having great difficulty following the concepts. Is calm.      Recent Labs:   CBC RESULTS:   Recent Labs   Lab Test 10/23/18 09/27/18 07/10/18 09/21/13  1455 06/01/11  1845   WBC 6.6  --  5.6 10.9  --    RBC 4.43  --  4.62 4.77  --    HGB 13.0 12.6 12.9 14.9 15.0   HCT 43.2  --  43.7 43.1 49.2   MCV 97.5  --  94.6 90 " 94.1   MCH  --   --   --  31.2 28.7   MCHC  --   --   --  34.6 30.5*   RDW 16.4*  --  15.9* 12.7 13.7     --  202 128*  --        Last Basic Metabolic Panel:  Recent Labs   Lab Test 12/10/18 11/21/18 11/19/18 11/05/18   *  --  135* 136   POTASSIUM 5.2 5.2 5.9* 5.0   CHLORIDE 94*  --  99 90*   KURTIS  --   --  10.1 10.4   CO2 32*  --  30 38*   BUN  --   --  15 18   CR  --   --  0.54* 0.70   GLC  --   --  121* 107*     GFR Estimate   Date Value Ref Range Status   11/19/2018 >60 >60 ml/min/1.73m2 Final   11/05/2018 >60 >60 ml/min/1.73m2 Final   10/29/2018 >60 >60 ml/min/1.73m2 Final   10/25/2018 >60 >60 ml/min/1.73m2 Final   10/23/2018 >60 >60 mL/min/1.73m2 Final       Lab Results   Component Value Date    A1C 6.3 09/27/2018    A1C 7.3 06/11/2018     Assessment/Plan:  (I50.9) CHF (congestive heart failure), NYHA class III, unspecified failure chronicity, unspecified type (H)  (primary encounter diagnosis)  Comment: Ongoing despite weight loss  Plan: Continue current dose of lasix.  BMP pending today and may need to give increased potassium given her weight loss.  Continue with monitoring of weights.  Hospice to enroll tomorrow. Continue oxygen at low flow.    (E11.9) Type 2 diabetes mellitus without complication, without long-term current use of insulin (H)  Comment: Chronic, but controlled  Plan: Continue current dose of metformin and continue to monitor accuchecks.  As she declines, will need to stop both metformin and monitoring of blood sugars for comfort.    (I48.2) Chronic atrial fibrillation (H)  Comment: Chronic  Plan: Continue metoprolol and ASA.  Monitor for comfort.    (R53.81) Declining functional status  Comment: Ongoing  Plan: Hospice eval pending tomorrow.  Continue to provide full assist with all ADL's.    (R62.7) Failure to thrive in adult  Comment: Ongoing  Plan: Hospice eval pending tomorrow.  Continue to provide full assist with all ADL's    Electronically signed by  Felicity Avalos,  APRN CNP

## 2018-12-26 NOTE — PROGRESS NOTES
[place] [facility]    Chief Complaint   Patient presents with     RECHECK     Home Care/Hospice       [facility] Medical Record Number:  [id number]  Place of Service where encounter took place:  [facility] [[id number]]    HPI:    Mireille Taylor is a 89 year old  (8/2/1929), who is being seen today for an episodic care visit.  HPI information obtained from: facility chart records, facility staff and [facility] chart review.Pt is unreliable historian due to cognitive loss.    Today's concern is:  Type 2 diabetes mellitus without complication, without long-term current use of insulin (H)  Metformin decreased to 500mg bid on 12/13, Accuchecks in past week: 0730: , 1700: 110-183    CHF (congestive heart failure), NYHA class III, unspecified failure chronicity, unspecified type (H)  Now on hospice care for ongoing decline secondary to multiple comorbid conditions.  Remains on continual oxygen.  Has not required prn zaroxolyn.     Chronic atrial fibrillation (H)  Remains on metoprolol and ASA.  Pulse ranges in past week: ,,  No reports of chest pain.    Failure to thrive in adult  Continues to decline requiring more assistance from staff for all cares.  Comfort is goal of tx and was enrolled in hospice this month.    GERD  Remains on protonix every day.  No reports of GI pain or upset.     Constipation  Pt reports that she is feeling constipation. BM records indicate BM's q3d with last BM yesterday, that was noted as soft and medium. On miralax every other day.    ALLERGIES: Bactrim [sulfamethoxazole w-trimethoprim]; Gabapentin; Lyrica; Methadone; Nsaids; Penicillin g; Tramadol; Cephalexin hcl; Clindamycin hcl; and Macrobid [nitrofurantoin]  Past Medical, Surgical, Family and Social History reviewed and updated in [facility].    Current Outpatient Medications   Medication Sig Dispense Refill     aspirin 81 MG EC tablet Take 1 tablet (81 mg) by mouth daily 90 tablet 3     bisacodyl  (DULCOLAX) 10 MG Suppository Every other day if no BM 25 suppository 1     chlorhexidine (PERIDEX) 0.12 % solution Swish and spit in mouth 2 times daily Rinse with 1/2 oz BID for 30 secs and expectorate       citalopram (CELEXA) 40 MG tablet TAKE 1 TABLET BY MOUTH DAILY 90 tablet 1     Cranberry (CRANBERRY CONCENTRATE) 500 MG CAPS Take 1 capsule (500 mg) by mouth 2 times daily 90 capsule      estradiol (ESTRACE VAGINAL) 0.1 MG/GM cream Place 2 g vaginally three times a week 42.5 g      furosemide (LASIX) 20 MG tablet Take 3 tablets (60 mg) by mouth daily 60 tablet 1     levalbuterol (XOPENEX) 0.63 MG/3ML neb solution Take 3 mLs (0.63 mg) by nebulization 2 times daily 360 mL      levothyroxine (SYNTHROID, LEVOTHROID) 125 MCG tablet Take 1 tablet (125 mcg) by mouth daily 90 tablet 1     LORazepam (ATIVAN) 0.5 MG tablet 0.25 mg in am and 0.5 mg at HS. 4 tablet 0     metFORMIN (GLUCOPHAGE) 500 MG tablet 500mg bid with meals. 180 tablet 1     methenamine hippurate (HIPREX) 1 G TABS TAKE 1 TABLET BY MOUTH ONCE A DAY.  TAKE ALONG WITH 1000MG VITAMIN-C 180 tablet 4     MetOLazone (ZAROXOLYN PO) Take 2.5 mg by mouth Give 1/2 hour before lasix if weight is >235 lbs (M-W-F)       metoprolol tartrate (LOPRESSOR) 25 MG tablet Take 1 tablet (25 mg) by mouth 2 times daily Hold for systolic BP <100 or pulse <60. 90 tablet 3     mirabegron (MYRBETRIQ) 50 MG 24 hr tablet Take 1 tablet (50 mg) by mouth daily 30 tablet 11     OxyCODONE HCl (ROXICODONE PO) Take 5 mg by mouth 2 times daily Give at 0630 and 2100. And q6h prn       pantoprazole (PROTONIX) 40 MG enteric coated tablet Take 1 tablet (40 mg) by mouth daily Take 30-60 minutes before a meal. 180 tablet 3     polyethylene glycol (MIRALAX) powder Take 17 g (1 capful) by mouth every other day 510 g 1     polyethylene glycol (MIRALAX/GLYCOLAX) powder Take 17 g by mouth daily as needed for constipation 119 g      potassium chloride ER (K-DUR/KLOR-CON M) 10 MEQ CR tablet Take 2  tablets (20 mEq) by mouth daily 90 tablet 1     Pseudoephedrine-DM-GG (ROBITUSSIN CF PO) Take 5 mLs by mouth 4 times daily as needed        Selenium Sulfide 2.25 % SHAM Externally apply 1 Application topically twice a week       sodium chloride ([employer] SALINE NASAL SPRAY) 0.65 % nasal spray Spray 2 sprays into both nostrils 2 times daily       traZODone (DESYREL) 50 MG tablet Take 1 tablet (50 mg) by mouth At Bedtime 30 tablet 1     Acetaminophen (TYLENOL PO) Take 650 mg by mouth 4 times daily       Medications reviewed:  Medications reconciled to facility chart and changes were made to reflect current medications as identified as above med list. Below are the changes that were made:   Medications stopped since last EPIC medication reconciliation:   There are no discontinued medications.    Medications started since last [facility] medication reconciliation:  No orders of the defined types were placed in this encounter.    Patient Active Problem List   Diagnosis     GERD (gastroesophageal reflux disease)     Systolic murmur     CARDIOVASCULAR SCREENING; LDL GOAL LESS THAN 100     Osteoporosis     Peripheral neuropathy     Anxiety     Vitamin D deficiency     Mixed incontinence urge and stress     Health Care Home     Frequent UTI, Dr Bigg Muhammad, Urologist     Candidiasis of skin     Dependent edema     Benzodiazepine dependence, pt reports daily use of valium since the 1930's.     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     Chronic low back pain with bilateral sciatica     Macular degeneration (senile) of retina     Recurrent major depressive disorder, in partial remission (H)     Seborrheic dermatitis     Morbid obesity (H)     BMI 35.0-35.9,adult     Leg skin lesion, left     Chronic atrial fibrillation (H)     RAD (reactive airway disease) with wheezing     Mild cognitive impairment with memory loss      "JAMES (obstructive sleep apnea)     CHF (congestive heart failure), NYHA class III, unspecified failure chronicity, unspecified type (H)     JAYME (generalized anxiety disorder)     Carbon dioxide retention     H/O breast implant, elective bilateral     Acid-base imbalance     Declining functional status     REVIEW OF SYSTEMS:  Limited secondary to cognitive impairment but today pt reports that she is  constipated.     Physical Exam:  /68   Pulse 88   Temp 97.7  F (36.5  C)   Resp 20   Ht 1.676 m (5' 6\")   Wt 100.7 kg (222 lb)   BMI 35.83 kg/m    GENERAL APPEARANCE: Alert,  female seen. Skin warm and dry.  Pink facial color.   Able to  express self verbally. Talkative with flat affect, but sentences are somewhat disjointed. Difficulty with word finding..  HEAD:  Normocephalic.  No facial asymmetry.  ENT: Mouth and posterior oropharynx normal, moist mucous membranes, .  EYES: EOM normal, conjunctiva and lids normal.   RESP:  Diminished lung sounds bilateral lower lobes.  Crackles bilateral bases.  Old surgical scar from thyroidectomy visible at base of neck and upper chest.. ..  No cough during visit..  CV: Irregularly, irregular rate and rhythm with rate of 98 , 2/6 systolic murmur. +3/+4 pitting leg (to hips),ankle and pedal edema   ABDOMEN: Large, rounded abdomen. BS's positive all 4 quadrants. C/o lower abdomen discomfort.  NEURO:  Alert,and sitting up in w/c, requesting to go to bed.  Confused.  No tremors.    Recent Labs:   CBC RESULTS:   Recent Labs   Lab Test 10/23/18 09/27/18 07/10/18 09/21/13  1455 06/01/11  1845   WBC 6.6  --  5.6 10.9  --    RBC 4.43  --  4.62 4.77  --    HGB 13.0 12.6 12.9 14.9 15.0   HCT 43.2  --  43.7 43.1 49.2   MCV 97.5  --  94.6 90 94.1   MCH  --   --   --  31.2 28.7   MCHC  --   --   --  34.6 30.5*   RDW 16.4*  --  15.9* 12.7 13.7     --  202 128*  --        Last Basic Metabolic Panel:  Recent Labs   Lab Test 12/17/18 12/10/18 11/19/18    133* 135*   POTASSIUM " 4.4 5.2 5.9*   CHLORIDE 94* 94* 99   KURTIS 9.7  --  10.1   CO2 34* 32* 30   BUN 22  --  15   CR 0.65  --  0.54*   *  --  121*     GFR Estimate   Date Value Ref Range Status   12/17/2018 >60 >60 ml/min/1.73m2 Final   11/19/2018 >60 >60 ml/min/1.73m2 Final   11/05/2018 >60 >60 ml/min/1.73m2 Final   10/29/2018 >60 >60 ml/min/1.73m2 Final   10/25/2018 >60 >60 ml/min/1.73m2 Final     Lab Results   Component Value Date    A1C 6.3 09/27/2018    A1C 7.3 06/11/2018     Assessment/Plan:  (E11.9) Type 2 diabetes mellitus without complication, without long-term current use of insulin (H)  (primary encounter diagnosis)  Comment: Chronic  Plan: Continue with current dose of metformin and current monitoring schedule.    (I50.9) CHF (congestive heart failure), NYHA class III, unspecified failure chronicity, unspecified type (H)  Comment: Ongoing,  Plan: Continue current dose of lasix as well as hospice care.  Discontinue zaroxolyn Continue oxygen via NC.     (I48.2) Chronic atrial fibrillation (H)  Comment: Chronic  Plan: Continue metoprolol and ASA.    (R62.7) Failure to thrive in adult  Comment: Ongoing  Plan: Continue hospice and comfort cares.     (K21.9) Gastroesophageal reflux disease, esophagitis presence not specified  Comment: Asymptomatic  Plan: Decrease Protonix to every other day for 2 weeks and then discontinue if no symptoms.    (K59.01) Slow transit constipation  Comment: Chronic  Plan: Increase miralax to every day.  Give Dulcolax supp today.    Electronically signed by  SABINE Borjas CNP

## 2019-01-01 ENCOUNTER — NURSING HOME VISIT (OUTPATIENT)
Dept: GERIATRICS | Facility: CLINIC | Age: 84
End: 2019-01-01
Payer: COMMERCIAL

## 2019-01-01 ENCOUNTER — TRANSFERRED RECORDS (OUTPATIENT)
Dept: HEALTH INFORMATION MANAGEMENT | Facility: CLINIC | Age: 84
End: 2019-01-01

## 2019-01-01 VITALS
BODY MASS INDEX: 34.72 KG/M2 | TEMPERATURE: 98.3 F | WEIGHT: 216 LBS | SYSTOLIC BLOOD PRESSURE: 97 MMHG | DIASTOLIC BLOOD PRESSURE: 66 MMHG | RESPIRATION RATE: 18 BRPM | HEART RATE: 72 BPM | HEIGHT: 66 IN

## 2019-01-01 VITALS — BODY MASS INDEX: 35.03 KG/M2 | HEART RATE: 26 BPM | WEIGHT: 218 LBS | RESPIRATION RATE: 6 BRPM | HEIGHT: 66 IN

## 2019-01-01 VITALS
WEIGHT: 219 LBS | SYSTOLIC BLOOD PRESSURE: 105 MMHG | HEIGHT: 66 IN | BODY MASS INDEX: 35.2 KG/M2 | HEART RATE: 87 BPM | OXYGEN SATURATION: 90 % | RESPIRATION RATE: 20 BRPM | TEMPERATURE: 96.7 F | DIASTOLIC BLOOD PRESSURE: 69 MMHG

## 2019-01-01 VITALS
BODY MASS INDEX: 35.19 KG/M2 | DIASTOLIC BLOOD PRESSURE: 69 MMHG | SYSTOLIC BLOOD PRESSURE: 107 MMHG | TEMPERATURE: 96.4 F | WEIGHT: 218 LBS | HEART RATE: 92 BPM

## 2019-01-01 DIAGNOSIS — I48.20 CHRONIC ATRIAL FIBRILLATION (H): ICD-10-CM

## 2019-01-01 DIAGNOSIS — E66.01 MORBID OBESITY (H): ICD-10-CM

## 2019-01-01 DIAGNOSIS — I50.9 CHF (CONGESTIVE HEART FAILURE), NYHA CLASS III, UNSPECIFIED FAILURE CHRONICITY, UNSPECIFIED TYPE (H): ICD-10-CM

## 2019-01-01 DIAGNOSIS — L03.114 CELLULITIS OF LEFT UPPER EXTREMITY: Primary | ICD-10-CM

## 2019-01-01 DIAGNOSIS — E11.9 TYPE 2 DIABETES MELLITUS WITHOUT COMPLICATION, WITHOUT LONG-TERM CURRENT USE OF INSULIN (H): Primary | ICD-10-CM

## 2019-01-01 DIAGNOSIS — F13.20 BENZODIAZEPINE DEPENDENCE (H): ICD-10-CM

## 2019-01-01 DIAGNOSIS — Z51.5 HOSPICE CARE PATIENT: ICD-10-CM

## 2019-01-01 DIAGNOSIS — F33.41 RECURRENT MAJOR DEPRESSIVE DISORDER, IN PARTIAL REMISSION (H): ICD-10-CM

## 2019-01-01 DIAGNOSIS — Z51.5 DYING CARE: Primary | ICD-10-CM

## 2019-01-01 DIAGNOSIS — R60.1 ANASARCA: ICD-10-CM

## 2019-01-01 DIAGNOSIS — G31.84 MILD COGNITIVE IMPAIRMENT WITH MEMORY LOSS: ICD-10-CM

## 2019-01-01 DIAGNOSIS — G63 POLYNEUROPATHY ASSOCIATED WITH UNDERLYING DISEASE (H): ICD-10-CM

## 2019-01-01 DIAGNOSIS — E11.9 TYPE 2 DIABETES MELLITUS WITHOUT COMPLICATION, WITHOUT LONG-TERM CURRENT USE OF INSULIN (H): ICD-10-CM

## 2019-01-01 DIAGNOSIS — L03.114 CELLULITIS OF LEFT UPPER EXTREMITY: ICD-10-CM

## 2019-01-01 LAB — POTASSIUM SERPL-SCNC: 4.5 MMOL/L (ref 3.5–5.2)

## 2019-01-01 PROCEDURE — 99310 SBSQ NF CARE HIGH MDM 45: CPT | Mod: GW | Performed by: NURSE PRACTITIONER

## 2019-01-01 PROCEDURE — 99309 SBSQ NF CARE MODERATE MDM 30: CPT | Mod: GW | Performed by: NURSE PRACTITIONER

## 2019-01-01 RX ORDER — DOXYCYCLINE 100 MG/1
100 CAPSULE ORAL 2 TIMES DAILY
Qty: 14 CAPSULE | Refills: 0
Start: 2019-01-01 | End: 2019-02-14

## 2019-01-01 ASSESSMENT — MIFFLIN-ST. JEOR
SCORE: 1421.52
SCORE: 1421.52
SCORE: 1435.13
SCORE: 1430.59

## 2019-01-07 PROBLEM — G63 POLYNEUROPATHY ASSOCIATED WITH UNDERLYING DISEASE (H): Status: ACTIVE | Noted: 2019-01-01

## 2019-01-07 PROBLEM — Z51.5 HOSPICE CARE PATIENT: Status: ACTIVE | Noted: 2019-01-01

## 2019-01-07 NOTE — PROGRESS NOTES
[place] [facility]    Chief Complaint   Patient presents with     RECHECK     Home Care/Hospice       [place] Medical Record Number:  [id number]  Place of Service where encounter took place:  [facility] [[id number]]    HPI:    Mireille Taylor is a 89 year old  (8/2/1929), who is being seen today for an episodic care visit.  HPI information obtained from: facility chart records, facility staff and [facility] chart review. Pt is unreliable historian due to cognitive loss.    Today's concern is:  Morbid obesity (H)  Weight is down 6 lbs in past month with diuresis.   Appetite is variable.     Chronic atrial fibrillation (H)  Continue on metoprolol with episodes of heart rates 100-110. No reports of chest pain.    Benzodiazepine dependence (H)  Dependent on benzodiazepines for 40 years. Now on hospice care and comfort is goal    CHF (congestive heart failure), NYHA class III, unspecified failure chronicity, unspecified type (H)  Remains on oxygen continuously.  Slight improvement with current diuretic. Brighter affect  Comfort is goal and is on hospice care.    Polyneuropathy associated with underlying disease (H)  Chronic LE discomfort that is multifactorial.  Current pain meds effective for controlling pain.     Type 2 diabetes mellitus without complication, without long-term current use of insulin (H)  Accuchecks this week: 0730: 111-164, 1700: 114-137.  Remains on metformin bid.    Hospice care patient, [facility]: [phone]  On [facility] for her decline secondary to CHF.  Comfort is goal of tx.     ALLERGIES: Bactrim [sulfamethoxazole w-trimethoprim]; Gabapentin; Lyrica; Methadone; Nsaids; Penicillin g; Tramadol; Cephalexin hcl; Clindamycin hcl; and Macrobid [nitrofurantoin]  Past Medical, Surgical, Family and Social History reviewed and updated in [facility].    Current Outpatient Medications   Medication Sig Dispense Refill     Acetaminophen (TYLENOL PO) Take 650 mg by mouth  4 times daily       aspirin 81 MG EC tablet Take 1 tablet (81 mg) by mouth daily 90 tablet 3     bisacodyl (DULCOLAX) 10 MG Suppository Every other day if no BM 25 suppository 1     citalopram (CELEXA) 40 MG tablet TAKE 1 TABLET BY MOUTH DAILY 90 tablet 1     Cranberry (CRANBERRY CONCENTRATE) 500 MG CAPS Take 1 capsule (500 mg) by mouth 2 times daily 90 capsule      estradiol (ESTRACE VAGINAL) 0.1 MG/GM cream Place 2 g vaginally three times a week 42.5 g      furosemide (LASIX) 20 MG tablet Take 3 tablets (60 mg) by mouth daily 60 tablet 1     levalbuterol (XOPENEX) 0.63 MG/3ML neb solution Take 3 mLs (0.63 mg) by nebulization 2 times daily 360 mL      levothyroxine (SYNTHROID, LEVOTHROID) 125 MCG tablet Take 1 tablet (125 mcg) by mouth daily 90 tablet 1     LORazepam (ATIVAN) 0.5 MG tablet 0.25 mg in am and 0.5 mg at HS. 4 tablet 0     metFORMIN (GLUCOPHAGE) 500 MG tablet 500mg bid with meals. 180 tablet 1     methenamine hippurate (HIPREX) 1 G TABS TAKE 1 TABLET BY MOUTH ONCE A DAY.  TAKE ALONG WITH 1000MG VITAMIN-C 180 tablet 4     metoprolol tartrate (LOPRESSOR) 25 MG tablet Take 1 tablet (25 mg) by mouth 2 times daily Hold for systolic BP <100 or pulse <60. 90 tablet 3     mirabegron (MYRBETRIQ) 50 MG 24 hr tablet Take 1 tablet (50 mg) by mouth daily 30 tablet 11     OxyCODONE HCl (ROXICODONE PO) Take 5 mg by mouth 2 times daily Give at 0630 and 2100. And q6h prn       pantoprazole (PROTONIX) 40 MG EC tablet Take 1 tablet (40 mg) by mouth every other day Take 30-60 minutes before a meal. 180 tablet 3     polyethylene glycol (MIRALAX) powder Take 17 g (1 capful) by mouth daily 510 g 1     polyethylene glycol (MIRALAX/GLYCOLAX) powder Take 17 g by mouth daily as needed for constipation 119 g      potassium chloride ER (K-DUR/KLOR-CON M) 10 MEQ CR tablet Take 2 tablets (20 mEq) by mouth daily 90 tablet 1     Pseudoephedrine-DM-GG (ROBITUSSIN CF PO) Take 5 mLs by mouth 4 times daily as needed        Selenium  Sulfide 2.25 % SHAM Externally apply 1 Application topically twice a week       sodium chloride ([employer]S SALINE NASAL SPRAY) 0.65 % nasal spray Spray 2 sprays into both nostrils 2 times daily       traZODone (DESYREL) 50 MG tablet Take 1 tablet (50 mg) by mouth At Bedtime 30 tablet 1     Medications reviewed:  Medications reconciled to facility chart and changes were made to reflect current medications as identified as above med list. Below are the changes that were made:   Medications stopped since last EPIC medication reconciliation:   There are no discontinued medications.    Medications started since last [facility] medication reconciliation:  No orders of the defined types were placed in this encounter.    Patient Active Problem List   Diagnosis     GERD (gastroesophageal reflux disease)     Systolic murmur     CARDIOVASCULAR SCREENING; LDL GOAL LESS THAN 100     Osteoporosis     Peripheral neuropathy     Anxiety     Vitamin D deficiency     Mixed incontinence urge and stress     Health Care Home     Frequent UTI, Dr Bigg Muhammad, Urologist     Candidiasis of skin     Dependent edema     Benzodiazepine dependence, pt reports daily use of valium since the 1930's.     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     Chronic low back pain with bilateral sciatica     Macular degeneration (senile) of retina     Recurrent major depressive disorder, in partial remission (H)     Seborrheic dermatitis     Morbid obesity (H)     BMI 35.0-35.9,adult     Leg skin lesion, left     Chronic atrial fibrillation (H)     RAD (reactive airway disease) with wheezing     Mild cognitive impairment with memory loss     JAMES (obstructive sleep apnea)     CHF (congestive heart failure), NYHA class III, unspecified failure chronicity, unspecified type (H)     JAYME (generalized anxiety disorder)     Carbon dioxide retention     H/O breast  "implant, elective bilateral     Acid-base imbalance     Declining functional status     Polyneuropathy associated with underlying disease (H)     [facility] [phone]     REVIEW OF SYSTEMS:  Limited secondary to cognitive impairment but today pt reports that she is doing well    Physical Exam:  BP 97/66   Pulse 72   Temp 98.3  F (36.8  C)   Resp 18   Ht 1.676 m (5' 6\")   Wt 98 kg (216 lb)   BMI 34.86 kg/m    GENERAL APPEARANCE: Alert,  female seen. Skin warm and dry.  Pink facial color.   Able to  express self verbally. Talkative with flat affect, but sentences are somewhat disjointed. Difficulty with word finding..  HEAD:  Normocephalic.  No facial asymmetry.  ENT: Mouth and posterior oropharynx normal, moist mucous membranes, .  EYES: EOM normal, conjunctiva and lids normal.   RESP:  Diminished lung sounds bilateral lower lobes.  Crackles bilateral bases.  Old surgical scar from thyroidectomy visible at base of neck and upper chest.. ..  No cough during visit..  CV: Irregularly, irregular rate and rhythm with rate of 104 , 2/6 systolic murmur. +3/+4 pitting leg (to hips),ankle and pedal edema   ABDOMEN: Large, rounded abdomen. BS's positive all 4 quadrants. No c/o  abdomen discomfort with palpation..  NEURO:  Alert,and sitting up in bed  No tremors. Moving upper extremities purposefully  PSYCH:  Pleasant, bright and interactive..     Recent Labs:   CBC RESULTS:   Recent Labs   Lab Test 10/23/18 09/27/18 07/10/18 09/21/13  1455 06/01/11  1845   WBC 6.6  --  5.6 10.9  --    RBC 4.43  --  4.62 4.77  --    HGB 13.0 12.6 12.9 14.9 15.0   HCT 43.2  --  43.7 43.1 49.2   MCV 97.5  --  94.6 90 94.1   MCH  --   --   --  31.2 28.7   MCHC  --   --   --  34.6 30.5*   RDW 16.4*  --  15.9* 12.7 13.7     --  202 128*  --        Last Basic Metabolic Panel:  Recent Labs   Lab Test 01/04/19 12/17/18 12/10/18 11/19/18   NA  --  136 133* 135*   POTASSIUM 4.5 4.4 5.2 5.9*   CHLORIDE  --  94* " 94* 99   KURTIS  --  9.7  --  10.1   CO2  --  34* 32* 30   BUN  --  22  --  15   CR  --  0.65  --  0.54*   GLC  --  114*  --  121*     GFR Estimate   Date Value Ref Range Status   12/17/2018 >60 >60 ml/min/1.73m2 Final   11/19/2018 >60 >60 ml/min/1.73m2 Final   11/05/2018 >60 >60 ml/min/1.73m2 Final   10/29/2018 >60 >60 ml/min/1.73m2 Final   10/25/2018 >60 >60 ml/min/1.73m2 Final       Lab Results   Component Value Date    A1C 6.3 09/27/2018    A1C 7.3 06/11/2018     Family Communication:  Son Raudel updated.     Assessment/Plan:  (E66.01) Morbid obesity (H)  Comment: Ongoing  Plan: Continue current POC.    (I48.2) Chronic atrial fibrillation (H)  Comment: Chronic  Plan: Continue current POC    (F13.20) Benzodiazepine dependence (H)  Comment: Chronic  Plan: No plan to decrease dose of benzodiazepine, as comfort is goal of tx.    (I50.9) CHF (congestive heart failure), NYHA class III, unspecified failure chronicity, unspecified type (H)  Comment: Chronic  Plan: Continue current dose of diuretic and potassium supplement.  Continue hospice care and oxygen.    (G63) Polyneuropathy associated with underlying disease (H)  Comment: Chronic  Plan: Continue with pain management    (E11.9) Type 2 diabetes mellitus without complication, without long-term current use of insulin (H)  Comment: Chronic, but controlled with metformin  Plan: Continue current POC.    (Z51.5) [facility] [phone]  Comment: Chronic  Plan: Continue current POC.    Electronically signed by  SABINE Borjas CNP

## 2019-01-16 NOTE — PROGRESS NOTES
[facility]    Chief Complaint   Patient presents with     RECHECK     Home Care/Hospice       [facility] Record Number:  [id number]  Place of Service where encounter took place:  [facility] [[id number]]    HPI:    Mireille Taylor is a 89 year old  (8/2/1929), who is being seen today for an episodic care visit.  HPI information obtained from: facility chart records, facility staff and [facility] chart review.Pt is unreliable historian due to cognitive loss.     Today's concern is:  Type 2 diabetes mellitus without complication, without long-term current use of insulin (H)  Accuchecks in past 2 weeks; 0700:  , 1700: 105-156.  No recent changes in metformin dose.    Recurrent major depressive disorder, in partial remission (H)  Remains on citalopram. PHQ 9 score of 21. Unclear of the accuracy of this as it was obtained while acutely ill during her CHF exacerbation.  Confusion is increasing and is not a reliable historian.    CHF (congestive heart failure), NYHA class III, unspecified failure chronicity, unspecified type (H)  Weight is stable, but continues with pitting edema from feet to waist as well as her arms.  Remains on oxygen continuously and is on hospice care.    Chronic atrial fibrillation (H)  Heart rate ranges in past two weeks:     [facility]: [phone]  Remains on hospice care.     ALLERGIES: Bactrim [sulfamethoxazole w-trimethoprim]; Gabapentin; Lyrica; Methadone; Nsaids; Penicillin g; Tramadol; Cephalexin hcl; Clindamycin hcl; and Macrobid [nitrofurantoin]  Past Medical, Surgical, Family and Social History reviewed and updated in [facility].    Current Outpatient Medications   Medication Sig Dispense Refill     Acetaminophen (TYLENOL PO) Take 650 mg by mouth 4 times daily       aspirin 81 MG EC tablet Take 1 tablet (81 mg) by mouth daily 90 tablet 3     bisacodyl (DULCOLAX) 10 MG Suppository Every other day if no BM 25  suppository 1     citalopram (CELEXA) 40 MG tablet TAKE 1 TABLET BY MOUTH DAILY 90 tablet 1     Cranberry (CRANBERRY CONCENTRATE) 500 MG CAPS Take 1 capsule (500 mg) by mouth 2 times daily 90 capsule      estradiol (ESTRACE VAGINAL) 0.1 MG/GM cream Place 2 g vaginally three times a week 42.5 g      furosemide (LASIX) 20 MG tablet Take 3 tablets (60 mg) by mouth daily 60 tablet 1     levalbuterol (XOPENEX) 0.63 MG/3ML neb solution Take 3 mLs (0.63 mg) by nebulization 2 times daily 360 mL      levothyroxine (SYNTHROID, LEVOTHROID) 125 MCG tablet Take 1 tablet (125 mcg) by mouth daily 90 tablet 1     LORazepam (ATIVAN) 0.5 MG tablet 0.25 mg in am and 0.5 mg at HS. 4 tablet 0     metFORMIN (GLUCOPHAGE) 500 MG tablet 500mg bid with meals. 180 tablet 1     methenamine hippurate (HIPREX) 1 G TABS TAKE 1 TABLET BY MOUTH ONCE A DAY.  TAKE ALONG WITH 1000MG VITAMIN-C 180 tablet 4     metoprolol tartrate (LOPRESSOR) 25 MG tablet Take 1 tablet (25 mg) by mouth 2 times daily Hold for systolic BP <100 or pulse <60. 90 tablet 3     mirabegron (MYRBETRIQ) 50 MG 24 hr tablet Take 1 tablet (50 mg) by mouth daily 30 tablet 11     OxyCODONE HCl (ROXICODONE PO) Take 5 mg by mouth 2 times daily Give at 0630 and 2100. And q6h prn       pantoprazole (PROTONIX) 40 MG EC tablet Take 1 tablet (40 mg) by mouth every other day Take 30-60 minutes before a meal. 180 tablet 3     polyethylene glycol (MIRALAX) powder Take 17 g (1 capful) by mouth daily 510 g 1     polyethylene glycol (MIRALAX/GLYCOLAX) powder Take 17 g by mouth daily as needed for constipation 119 g      potassium chloride ER (K-DUR/KLOR-CON M) 10 MEQ CR tablet Take 2 tablets (20 mEq) by mouth daily 90 tablet 1     Pseudoephedrine-DM-GG (ROBITUSSIN CF PO) Take 5 mLs by mouth 4 times daily as needed        Selenium Sulfide 2.25 % SHAM Externally apply 1 Application topically twice a week       sodium chloride ([employer] SALINE NASAL SPRAY) 0.65 % nasal spray Spray 2 sprays into both  nostrils 2 times daily       traZODone (DESYREL) 50 MG tablet Take 1 tablet (50 mg) by mouth At Bedtime 30 tablet 1     Medications reviewed:  Medications reconciled to facility chart and changes were made to reflect current medications as identified as above med list. Below are the changes that were made:   Medications stopped since last EPIC medication reconciliation:   There are no discontinued medications.    Medications started since last [facility] medication reconciliation:  No orders of the defined types were placed in this encounter.    Patient Active Problem List   Diagnosis     GERD (gastroesophageal reflux disease)     Systolic murmur     CARDIOVASCULAR SCREENING; LDL GOAL LESS THAN 100     Osteoporosis     Peripheral neuropathy     Anxiety     Vitamin D deficiency     Mixed incontinence urge and stress     Health Care Home     Frequent UTI, Dr Bigg Muhammad, Urologist     Candidiasis of skin     Dependent edema     Benzodiazepine dependence, pt reports daily use of valium since the 1930's.     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     Chronic low back pain with bilateral sciatica     Macular degeneration (senile) of retina     Recurrent major depressive disorder, in partial remission (H)     Seborrheic dermatitis     Morbid obesity (H)     BMI 35.0-35.9,adult     Leg skin lesion, left     Chronic atrial fibrillation (H)     RAD (reactive airway disease) with wheezing     Mild cognitive impairment with memory loss     JAMES (obstructive sleep apnea)     CHF (congestive heart failure), NYHA class III, unspecified failure chronicity, unspecified type (H)     JAYME (generalized anxiety disorder)     Carbon dioxide retention     H/O breast implant, elective bilateral     Acid-base imbalance     Declining functional status     Polyneuropathy associated with underlying disease (H)     Hospice care  [facility]: [phone]     REVIEW OF SYSTEMS:  Limited secondary to cognitive impairment but today pt reports that she is doing okay    Physical Exam:  /69   Pulse 87   Temp 96.7  F (35.9  C)   Resp 20   Ht 1.676 m (5' 6\")   Wt 99.3 kg (219 lb)   SpO2 90%   BMI 35.35 kg/m    GENERAL APPEARANCE: Alert,  female seen. Skin warm and dry.  Pink facial color.   Able to  express self verbally. Talkative with flat affect, but sentences are disjointed. Difficulty with word finding..  HEAD:  Normocephalic.  No facial asymmetry.  ENT: Mouth and posterior oropharynx normal, moist mucous membranes, .  EYES: EOM normal, conjunctiva and lids normal.   RESP:  Diminished lung sounds bilateral lower lobes.  Crackles bilateral bases.  Old surgical scar from thyroidectomy visible at base of neck and upper chest.. ..  No cough during visit..  CV: Irregularly, irregular rate and rhythm with rate of 87,  2/6 systolic murmur. +3/+4 pitting leg (to hips),ankle and pedal edema  Has pitting edema of left arm.  ABDOMEN: Large, rounded abdomen. BS's positive all 4 quadrants. No c/o  abdomen discomfort with palpation..  NEURO:  Very confused with disjointed sentences and no flow with conversation.   PSYCH:  Calm and interactive.  No statements of sadness..    Recent Labs:   CBC RESULTS:   Recent Labs   Lab Test 10/23/18 09/27/18 07/10/18 09/21/13  1455 06/01/11  1845   WBC 6.6  --  5.6 10.9  --    RBC 4.43  --  4.62 4.77  --    HGB 13.0 12.6 12.9 14.9 15.0   HCT 43.2  --  43.7 43.1 49.2   MCV 97.5  --  94.6 90 94.1   MCH  --   --   --  31.2 28.7   MCHC  --   --   --  34.6 30.5*   RDW 16.4*  --  15.9* 12.7 13.7     --  202 128*  --        Last Basic Metabolic Panel:  Recent Labs   Lab Test 01/04/19 12/17/18 12/10/18 11/19/18   NA  --  136 133* 135*   POTASSIUM 4.5 4.4 5.2 5.9*   CHLORIDE  --  94* 94* 99   KURTIS  --  9.7  --  10.1   CO2  --  34* 32* 30   BUN  --  22  --  15   CR  --  0.65  --  0.54*   GLC  --  114*  " --  121*       GFR Estimate   Date Value Ref Range Status   12/17/2018 >60 >60 ml/min/1.73m2 Final   11/19/2018 >60 >60 ml/min/1.73m2 Final   11/05/2018 >60 >60 ml/min/1.73m2 Final   10/29/2018 >60 >60 ml/min/1.73m2 Final   10/25/2018 >60 >60 ml/min/1.73m2 Final       TSH   Date Value Ref Range Status   02/05/2018 2.94 0.30 - 4.50 uIU/mL Final   02/02/2017 0.60 0.20 - 4.50 mcU/mL Final   02/02/2017 0.60 0.20 - 4.50 ulU/ml Final       Lab Results   Component Value Date    A1C 6.3 09/27/2018    A1C 7.3 06/11/2018     Family Communication:  Message left for julia Starks with update.    Assessment/Plan:  (E11.9) Type 2 diabetes mellitus without complication, without long-term current use of insulin (H)  (primary encounter diagnosis)  Comment: Chronic, but controlled  Plan: Continue current POC.    (F33.41) Recurrent major depressive disorder, in partial remission (H)  Comment: Long standing, but appears at baseline  Plan: Continue current antidepressant.    (I50.9) CHF (congestive heart failure), NYHA class III, unspecified failure chronicity, unspecified type (H)  Comment: Chronic and continues with third spacing of fluid  Plan: Continue current dose of lasix as well as continue hospice care.  Comfort is goal of tx.    (I48.2) Chronic atrial fibrillation (H)  Comment: Chronic, rate controlled at this time  Plan: Continue current POC.    (Z51.5) [facility]: [phone]  Comment: Chronic  Plan: Continue hospice care.     Electronically signed by  SABINE Borjas CNP

## 2019-02-07 NOTE — PROGRESS NOTES
[facility] [facility]    Chief Complaint   Patient presents with     detention Acute       [facility] Record Number:  [id number]  Place of Service where encounter took place:  [facility] [[id number]]    HPI:    Mireille Taylor is a 89 year old  (8/2/1929), who is being seen today for an episodic care visit.  HPI information obtained from: facility chart records, facility staff, patient report and [facility] chart review.Today's concern is:  Cellulitis of left upper extremity  Hospice nurse reported on 2/7 that pt had redness of left arm from wrist to elbow that was hot and painful to touch.  Started on doxycycline.  Afebrile.    CHF (congestive heart failure), NYHA class III, unspecified failure chronicity, unspecified type (H)  Remains on hospice care.  Continuous oxygen required to keep O2 sats >90% Did have an episode of non responsiveness on 1/31 during posiition changed with perioral cyanosis. Recovered quickly after oxygen increased to 6l from 1 L.  Sats were at 88% initially and then incerased to 90%.    Mild cognitive impairment with memory loss  Cognitive impairment has declined and she lacks insight into how ill she is. Will report things that are not accurate.    [facility]osp[facility] care patient, [facility]: [phone]  Remains on hospice care.     ALLERGIES: Bactrim [sulfamethoxazole w-trimethoprim]; Gabapentin; Lyrica; Methadone; Nsaids; Penicillin g; Tramadol; Cephalexin hcl; Clindamycin hcl; and Macrobid [nitrofurantoin]  Past Medical, Surgical, Family and Social History reviewed and updated in [facility].    Current Outpatient Medications   Medication Sig Dispense Refill     doxycycline hyclate (VIBRAMYCIN) 100 MG capsule Take 1 capsule (100 mg) by mouth 2 times daily for 7 days 14 capsule 0     Acetaminophen (TYLENOL PO) Take 650 mg by mouth 4 times daily       aspirin 81 MG EC tablet Take 1 tablet (81 mg) by mouth daily 90 tablet 3     bisacodyl (DULCOLAX) 10 MG  Suppository Every other day if no BM 25 suppository 1     citalopram (CELEXA) 40 MG tablet TAKE 1 TABLET BY MOUTH DAILY 90 tablet 1     Cranberry (CRANBERRY CONCENTRATE) 500 MG CAPS Take 1 capsule (500 mg) by mouth 2 times daily 90 capsule      furosemide (LASIX) 20 MG tablet Take 3 tablets (60 mg) by mouth daily 60 tablet 1     levalbuterol (XOPENEX) 0.63 MG/3ML neb solution Take 3 mLs (0.63 mg) by nebulization 2 times daily 360 mL      levothyroxine (SYNTHROID, LEVOTHROID) 125 MCG tablet Take 1 tablet (125 mcg) by mouth daily 90 tablet 1     LORazepam (ATIVAN) 0.5 MG tablet 0.25 mg in am and 0.5 mg at HS. 4 tablet 0     metFORMIN (GLUCOPHAGE) 500 MG tablet 500mg bid with meals. 180 tablet 1     methenamine hippurate (HIPREX) 1 G TABS TAKE 1 TABLET BY MOUTH ONCE A DAY.  TAKE ALONG WITH 1000MG VITAMIN-C 180 tablet 4     metoprolol tartrate (LOPRESSOR) 25 MG tablet Take 1 tablet (25 mg) by mouth 2 times daily Hold for systolic BP <100 or pulse <60. 90 tablet 3     mirabegron (MYRBETRIQ) 50 MG 24 hr tablet Take 1 tablet (50 mg) by mouth daily 30 tablet 11     OxyCODONE HCl (ROXICODONE PO) Take 5 mg by mouth 2 times daily Give at 0630 and 2100. And q6h prn       polyethylene glycol (MIRALAX) powder Take 17 g (1 capful) by mouth daily 510 g 1     polyethylene glycol (MIRALAX/GLYCOLAX) powder Take 17 g by mouth daily as needed for constipation 119 g      potassium chloride ER (K-DUR/KLOR-CON M) 10 MEQ CR tablet Take 2 tablets (20 mEq) by mouth daily 90 tablet 1     Pseudoephedrine-DM-GG (ROBITUSSIN CF PO) Take 5 mLs by mouth 4 times daily as needed        Selenium Sulfide 2.25 % SHAM Externally apply 1 Application topically twice a week       sodium chloride ([employer]S SALINE NASAL SPRAY) 0.65 % nasal spray Spray 2 sprays into both nostrils 2 times daily       traZODone (DESYREL) 50 MG tablet Take 1 tablet (50 mg) by mouth At Bedtime 30 tablet 1     Medications reviewed:  Medications reconciled to facility chart and changes  were made to reflect current medications as identified as above med list. Below are the changes that were made:   Medications stopped since last EPIC medication reconciliation:   There are no discontinued medications.    Medications started since last [facility] medication reconciliation:  No orders of the defined types were placed in this encounter.    Patient Active Problem List   Diagnosis     GERD (gastroesophageal reflux disease)     Systolic murmur     CARDIOVASCULAR SCREENING; LDL GOAL LESS THAN 100     Osteoporosis     Peripheral neuropathy     Anxiety     Vitamin D deficiency     Mixed incontinence urge and stress     Health Care Home     Frequent UTI, Dr Bigg Muhammad, Urologist     Candidiasis of skin     Dependent edema     Benzodiazepine dependence, pt reports daily use of valium since the 1930's.     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     Chronic low back pain with bilateral sciatica     Macular degeneration (senile) of retina     Recurrent major depressive disorder, in partial remission (H)     Seborrheic dermatitis     Morbid obesity (H)     BMI 35.0-35.9,adult     Leg skin lesion, left     Chronic atrial fibrillation (H)     RAD (reactive airway disease) with wheezing     Mild cognitive impairment with memory loss     JAMES (obstructive sleep apnea)     CHF (congestive heart failure), NYHA class III, unspecified failure chronicity, unspecified type (H)     JAYME (generalized anxiety disorder)     Carbon dioxide retention     H/O breast implant, elective bilateral     Acid-base imbalance     Declining functional status     Polyneuropathy associated with underlying disease (H)     [facility] care [facility]: [phone]     REVIEW OF SYSTEMS:  Limited secondary to cognitive impairment but today pt reports that she is doing okay    Physical Exam:  /69   Pulse 92   Temp 96.4   F (35.8  C)   Wt 98.9 kg (218 lb)   BMI 35.19 kg/m    GENERAL APPEARANCE: Alert,  female seen. Skin cool and dry.  Pink facial color.   Able to  express self verbally. Talkative with flat affect, but sentences are disjointed. Difficulty with word finding..  HEAD:  Normocephalic.  No facial asymmetry.  ENT: Mouth and posterior oropharynx normal, moist mucous membranes, .  EYES: EOM normal, conjunctiva and lids normal.   RESP:  Diminished lung sounds bilateral lower lobes.  Crackles bilateral bases.  Old surgical scar from thyroidectomy visible at base of neck and upper chest.. ..  No cough during visit..  CV: Irregularly, irregular rate and rhythm with rate of 92,  2/6 systolic murmur. +3/+4 pitting leg (to hips),ankle and pedal edema  Has pitting edema of left arm.  ABDOMEN: Large, rounded abdomen. BS's positive all 4 quadrants. No c/o  abdomen discomfort with palpation..  NEURO:  Very confused with disjointed sentences and no flow with conversation.   SKIN:  Has scabbed wound above left wrist and left forearm is mildly reddened.  Significant pitting edema involving left hand forearm and bilateral lower extremities from toes to hips.  Skin on dorsal portion of bilateral feet is reddened on bottoms of toes, skin immediately distal to toes and bottom of heels.  No pain to touch.  Area is cold and skin over arches is pale white. Tops of feet are edematous, pale and cold to touch. Heels are floated on pillows.   PSYCH:  Calm and interactive.  No statements of sadness, but appears ill..    Recent Labs:   CBC RESULTS:   Recent Labs   Lab Test 10/23/18 09/27/18 07/10/18   WBC 6.6  --  5.6   RBC 4.43  --  4.62   HGB 13.0 12.6 12.9   HCT 43.2  --  43.7   MCV 97.5  --  94.6   MCH  --   --   --    MCHC  --   --   --    RDW 16.4*  --  15.9*     --  202       Last Basic Metabolic Panel:  Recent Labs   Lab Test 01/04/19 12/17/18 12/10/18 11/19/18   NA  --  136 133* 135*   POTASSIUM 4.5 4.4 5.2 5.9*   CHLORIDE  --  94*  94* 99   KURTIS  --  9.7  --  10.1   CO2  --  34* 32* 30   BUN  --  22  --  15   CR  --  0.65  --  0.54*   GLC  --  114*  --  121*       GFR Estimate   Date Value Ref Range Status   12/17/2018 >60 >60 ml/min/1.73m2 Final   11/19/2018 >60 >60 ml/min/1.73m2 Final   11/05/2018 >60 >60 ml/min/1.73m2 Final   10/29/2018 >60 >60 ml/min/1.73m2 Final   10/25/2018 >60 >60 ml/min/1.73m2 Final       TSH   Date Value Ref Range Status   02/05/2018 2.94 0.30 - 4.50 uIU/mL Final   02/02/2017 0.60 0.20 - 4.50 mcU/mL Final   02/02/2017 0.60 0.20 - 4.50 ulU/ml Final       Lab Results   Component Value Date    A1C 6.3 09/27/2018    A1C 7.3 06/11/2018     Family Communication:  Son Raudel updated via voicemail    Assessment/Plan:  (L03.114) Cellulitis of left upper extremity  (primary encounter diagnosis)  Comment: Likely secondary to self inflicted wound above wrist and inability to heal due to edema  Plan: Complete course of doxycycline.  Monitor for comfort.  Continue hospice care.    (I50.9) CHF (congestive heart failure), NYHA class III, unspecified failure chronicity, unspecified type (H)  Comment: Chronic  Plan: Continue lasix for comfort.  Continue oxygen via [place].  Continue hospice as comfort is the goal of tx.    (G31.84) Mild cognitive impairment with memory loss  Comment: Declining memory  Plan: Support pt for comfort.  Continue with full time skilled nursing care.    (Z51.5) [facility]: [phone]  Comment: Chronic  Plan: Continue hospice and comfort interventions.     Electronically signed by  SABINE Borjas CNP

## 2019-02-11 NOTE — PROGRESS NOTES
[facility]    Chief Complaint   Patient presents with     Nursing Home Acute     Home Care/Hospice       [place] Medical Record Number:  [id number]  Place of Service where encounter took place:      HPI:    Mireille Taylor is a 89 year old  (8/2/1929), who is being seen today for an episodic care visit.  HPI information obtained from: facility chart records, facility staff and [facility] chart review.Pt is unreliable historian due to cognitive loss.     Today's concern is:  Dying care  Pt had been awake per usual this morning. Drinking fluids, took meds, bites of food.  Quickly changed mid morning and now is non responsive and appears to be dying.    CHF (congestive heart failure), NYHA class III, unspecified failure chronicity, unspecified type (H)  Continues with overall anasarca.  Oxygen on via NC.    Cellulitis of left upper extremity  No further redness noted on left arm.    Anasarca  Ongoing issue with her CHF.      ALLERGIES: Bactrim [sulfamethoxazole w-trimethoprim]; Gabapentin; Lyrica; Methadone; Nsaids; Penicillin g; Tramadol; Cephalexin hcl; Clindamycin hcl; and Macrobid [nitrofurantoin]  Past Medical, Surgical, Family and Social History reviewed and updated in [facility].    Current Outpatient Medications   Medication Sig Dispense Refill     Acetaminophen (TYLENOL PO) Take 650 mg by mouth 4 times daily       aspirin 81 MG EC tablet Take 1 tablet (81 mg) by mouth daily 90 tablet 3     bisacodyl (DULCOLAX) 10 MG Suppository Every other day if no BM 25 suppository 1     citalopram (CELEXA) 40 MG tablet TAKE 1 TABLET BY MOUTH DAILY 90 tablet 1     Cranberry (CRANBERRY CONCENTRATE) 500 MG CAPS Take 1 capsule (500 mg) by mouth 2 times daily 90 capsule      doxycycline hyclate (VIBRAMYCIN) 100 MG capsule Take 1 capsule (100 mg) by mouth 2 times daily for 7 days 14 capsule 0     furosemide (LASIX) 20 MG tablet Take 3 tablets (60 mg) by mouth daily 60 tablet 1     levalbuterol (XOPENEX) 0.63  MG/3ML neb solution Take 3 mLs (0.63 mg) by nebulization 2 times daily 360 mL      levothyroxine (SYNTHROID, LEVOTHROID) 125 MCG tablet Take 1 tablet (125 mcg) by mouth daily 90 tablet 1     LORazepam (ATIVAN) 0.5 MG tablet 0.25 mg in am and 0.5 mg at HS. 4 tablet 0     metFORMIN (GLUCOPHAGE) 500 MG tablet 500mg bid with meals. 180 tablet 1     methenamine hippurate (HIPREX) 1 G TABS TAKE 1 TABLET BY MOUTH ONCE A DAY.  TAKE ALONG WITH 1000MG VITAMIN-C 180 tablet 4     metoprolol tartrate (LOPRESSOR) 25 MG tablet Take 1 tablet (25 mg) by mouth 2 times daily Hold for systolic BP <100 or pulse <60. 90 tablet 3     mirabegron (MYRBETRIQ) 50 MG 24 hr tablet Take 1 tablet (50 mg) by mouth daily 30 tablet 11     OxyCODONE HCl (ROXICODONE PO) Take 5 mg by mouth 2 times daily Give at 0630 and 2100. And q6h prn       polyethylene glycol (MIRALAX) powder Take 17 g (1 capful) by mouth daily 510 g 1     polyethylene glycol (MIRALAX/GLYCOLAX) powder Take 17 g by mouth daily as needed for constipation 119 g      potassium chloride ER (K-DUR/KLOR-CON M) 10 MEQ CR tablet Take 2 tablets (20 mEq) by mouth daily 90 tablet 1     Pseudoephedrine-DM-GG (ROBITUSSIN CF PO) Take 5 mLs by mouth 4 times daily as needed        Selenium Sulfide 2.25 % SHAM Externally apply 1 Application topically twice a week       sodium chloride ([employer] SALINE NASAL SPRAY) 0.65 % nasal spray Spray 2 sprays into both nostrils 2 times daily       traZODone (DESYREL) 50 MG tablet Take 1 tablet (50 mg) by mouth At Bedtime 30 tablet 1     Medications reviewed:  Medications reconciled to facility chart and changes were made to reflect current medications as identified as above med list. Below are the changes that were made:   Medications stopped since last EPIC medication reconciliation:   There are no discontinued medications.    Medications started since last [facility] medication reconciliation:  No orders of the defined types were placed in this encounter.    Patient  "Active Problem List   Diagnosis     GERD (gastroesophageal reflux disease)     Systolic murmur     CARDIOVASCULAR SCREENING; LDL GOAL LESS THAN 100     Osteoporosis     Peripheral neuropathy     Anxiety     Vitamin D deficiency     Mixed incontinence urge and stress     Health Care Home     Frequent UTI, Dr Bigg Muhammad, Urologist     Candidiasis of skin     Dependent edema     Benzodiazepine dependence, pt reports daily use of valium since the 1930's.     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     Chronic low back pain with bilateral sciatica     Macular degeneration (senile) of retina     Recurrent major depressive disorder, in partial remission (H)     Seborrheic dermatitis     Morbid obesity (H)     BMI 35.0-35.9,adult     Leg skin lesion, left     Chronic atrial fibrillation (H)     RAD (reactive airway disease) with wheezing     Mild cognitive impairment with memory loss     JAMES (obstructive sleep apnea)     CHF (congestive heart failure), NYHA class III, unspecified failure chronicity, unspecified type (H)     JAYME (generalized anxiety disorder)     Carbon dioxide retention     H/O breast implant, elective bilateral     Acid-base imbalance     Declining functional status     Polyneuropathy associated with underlying disease (H)     [facility] [phone]       REVIEW OF SYSTEMS:  Limited secondary to unresponsive state    Physical Exam:  Ht 1.676 m (5' 6\")   Wt 98.9 kg (218 lb)   BMI 35.19 kg/m    GENERAL APPEARANCE: Unresponsive female seen.Pale facial color.    HEAD:  Normocephalic.  No facial asymmetry.  ENT: Shallow mouth breathing .  RESP:  shallow gasping breaths which are very slow.  CV: Irregularly, irregular rate and rhythm with thready slow pulse. +3/+4 pitting leg (to hips),ankle and pedal edema  Has pitting edema of left arm.  ABDOMEN: Large, rounded " abdomen.  NEURO:  Non responsive   PSYCH:  Calm     Recent Labs:   CBC RESULTS:   Recent Labs   Lab Test 10/23/18 09/27/18 07/10/18 09/21/13  1455 06/01/11  1845   WBC 6.6  --  5.6 10.9  --    RBC 4.43  --  4.62 4.77  --    HGB 13.0 12.6 12.9 14.9 15.0   HCT 43.2  --  43.7 43.1 49.2   MCV 97.5  --  94.6 90 94.1   MCH  --   --   --  31.2 28.7   MCHC  --   --   --  34.6 30.5*   RDW 16.4*  --  15.9* 12.7 13.7     --  202 128*  --        Last Basic Metabolic Panel:  Recent Labs   Lab Test 01/04/19 12/17/18 12/10/18 11/19/18   NA  --  136 133* 135*   POTASSIUM 4.5 4.4 5.2 5.9*   CHLORIDE  --  94* 94* 99   KURTIS  --  9.7  --  10.1   CO2  --  34* 32* 30   BUN  --  22  --  15   CR  --  0.65  --  0.54*   GLC  --  114*  --  121*    < > = values in this interval not displayed.     GFR Estimate   Date Value Ref Range Status   12/17/2018 >60 >60 ml/min/1.73m2 Final   11/19/2018 >60 >60 ml/min/1.73m2 Final   11/05/2018 >60 >60 ml/min/1.73m2 Final   10/29/2018 >60 >60 ml/min/1.73m2 Final   10/25/2018 >60 >60 ml/min/1.73m2 Final     Lab Results   Component Value Date    A1C 6.3 09/27/2018    A1C 7.3 06/11/2018     Hospice notified of impending death    [facility] notified.    Assessment/Plan:  (Z51.5) Dying care  (primary encounter diagnosis)  Comment: Death will be occurring soon.  Plan: Will stay with pt until death.  Appears comfortable    (I50.9) CHF (congestive heart failure), NYHA class III, unspecified failure chronicity, unspecified type (H)  Comment: Nearing death  Plan: Same as above    (L03.114) Cellulitis of left upper extremity  Comment: Nearing death  Plan: Same as above    (R60.1) Anasarca  Comment: Nearing death  Plan: Same as above    ADDENDUM:  10:47.  No heart beat or respirations.    Family notifed    Total time spent with patient visit was 38 min including patient visit, review of past records, phone call to patient contact and meeting with family after death.. Greater than 50% of total time spent  with counseling and coordinating care due to dying process.     Electronically signed by  SABINE Borjas CNP

## 2019-08-05 NOTE — PROGRESS NOTES
[facility]    Chief Complaint   Patient presents with     ARNOLD       [place] Medical Record Number:  [id number]  Place of Service where encounter took place:  [facility] [[id number]]    HPI:    Mireille Taylor is a 89 year old  (8/2/1929), who is being seen today for an episodic care visit.  HPI information obtained from: facility chart records, facility staff, patient report and [facility] chart review. Pt is not reliable in ROS due to advancing confusion. Today's concern is:  CHF (congestive heart failure), NYHA class III, unspecified failure chronicity, unspecified type (H)  Received a dose of Zaroxolyn yesterday due to increased LE edema and elevated BNP.  Yesterday's weight was 227, up three lbs from 5 days earlier. Today, face appears thinner, but LE pitting edema remains.    Chronic atrial fibrillation (H)  Pulse ranges in past week: 60-95.  No reports of chest pain.    Type 2 diabetes mellitus without complication, with long-term current use of insulin (H)  Metformin dose decreased on 10/22.  Accuchecks since then: 0730: 125-148, 1700: 134-204.    Moderate persistent reactive airway disease without complication  O2 dependent.  Due to CO2 retention O2 flow rate at 1-2l/min  No reports of cough.    Mild cognitive impairment with memory loss  Ongoing decline with memory loss as well as delusional thoughts.  This is variable and likely dependent on fluctuating health issues.     ALLERGIES: Bactrim [sulfamethoxazole w-trimethoprim]; Gabapentin; Lyrica; Methadone; Nsaids; Penicillin g; Tramadol; Cephalexin hcl; Clindamycin hcl; and Macrobid [nitrofurantoin]  Past Medical, Surgical, Family and Social History reviewed and updated in [facility].    Current Outpatient Prescriptions   Medication Sig Dispense Refill     Acetaminophen (TYLENOL PO) Take 650 mg by mouth 4 times daily       Ascorbic Acid 1000 MG TABS Take 1,000 mg by mouth daily       aspirin 81 MG EC tablet Take 1  [facility] Brief Operative Note    Pre-operative diagnosis: RIGHT OVARIAN CYST ; MENNORHAGHIA    Post-operative diagnosis same   Procedure: Procedure(s):  LAPAROSCOPY ; RIGHT OVARIAN CYSTECTOMY ; HYSTEROSCOPY, DILATION AND CURETTAGE  - Wound Class: II-Clean Contaminated   - Wound Class: II-Clean Contaminated   Surgeon(s): Surgeon(s) and Role:  Panel 1:     * Mimi Schuster MD - Primary    Panel 2:     * Mimi Schuster MD - Primary   Estimated blood loss: * No values recorded between 2/1/2018  9:48 AM and 2/1/2018 10:25 AM *    Specimens:   ID Type Source Tests Collected by Time Destination   A : Endometrial curettings Tissue Endometrium SURGICAL PATHOLOGY EXAM Mimi Schuster MD 2/1/2018  9:52 AM    B : PELVIC WASHINGS Washings Peritoneum CYTOLOGY NON GYN Mimi Schuster MD 2/1/2018 10:03 AM    C : RIGHT OVARIAN CYST WALL Tissue Ovary, Right SURGICAL PATHOLOGY EXAM Mimi Schuster MD 2/1/2018 10:15 AM       Findings: Normal endometrial cavity,  Right ovarian cyst      tablet (81 mg) by mouth daily 90 tablet 3     bisacodyl (DULCOLAX) 10 MG Suppository Every other day if no BM 25 suppository 1     chlorhexidine (PERIDEX) 0.12 % solution Swish and spit in mouth 2 times daily Rinse with 1/2 oz BID for 30 secs and expectorate       citalopram (CELEXA) 40 MG tablet TAKE 1 TABLET BY MOUTH DAILY 90 tablet 1     Cranberry (CRANBERRY CONCENTRATE) 500 MG CAPS Take 1 capsule (500 mg) by mouth 2 times daily 90 capsule      estradiol (ESTRACE VAGINAL) 0.1 MG/GM cream Place 2 g vaginally three times a week 42.5 g      furosemide (LASIX) 20 MG tablet Take 3 tablets (60 mg) by mouth daily 60 tablet 1     levalbuterol (XOPENEX) 0.63 MG/3ML neb solution Take 3 mLs (0.63 mg) by nebulization 2 times daily 360 mL      levalbuterol (XOPENEX) 0.63 MG/3ML neb solution Take 3 mLs (0.63 mg) by nebulization every 8 hours as needed for shortness of breath / dyspnea or wheezing And bid for two weeks. 360 mL      levothyroxine (SYNTHROID, LEVOTHROID) 125 MCG tablet Take 1 tablet (125 mcg) by mouth daily 90 tablet 1     LORazepam (ATIVAN) 0.5 MG tablet 0.25 mg in am and 0.5 mg at HS. 4 tablet 0     metFORMIN (GLUCOPHAGE) 500 MG tablet 750 mg in am and 500 mg pm. 180 tablet 1     methenamine hippurate (HIPREX) 1 G TABS TAKE 1 TABLET BY MOUTH ONCE A DAY.  TAKE ALONG WITH 1000MG VITAMIN-C 180 tablet 4     MetOLazone (ZAROXOLYN PO) Take 2.5 mg by mouth Give 1/2 hour before lasix if weight is >235 lbs (M-W-F)       metoprolol tartrate (LOPRESSOR) 25 MG tablet Take 1 tablet (25 mg) by mouth 2 times daily Hold for systolic BP <100 or pulse <60. 90 tablet 3     mirabegron (MYRBETRIQ) 50 MG 24 hr tablet Take 1 tablet (50 mg) by mouth daily 30 tablet 11     Multiple Vitamins-Minerals (PRESERVISION AREDS 2 PO) Take 1 tablet by mouth 2 times daily       OxyCODONE HCl (ROXICODONE PO) Take 5 mg by mouth 2 times daily Give at 0630 and 2100. And q6h prn       pantoprazole (PROTONIX) 40 MG enteric coated tablet Take 1 tablet  (40 mg) by mouth daily Take 30-60 minutes before a meal. 180 tablet 3     polyethylene glycol (MIRALAX) powder Take 17 g (1 capful) by mouth every other day 510 g 1     polyethylene glycol (MIRALAX/GLYCOLAX) powder Take 17 g by mouth daily as needed for constipation 119 g      potassium chloride SA (K-DUR/KLOR-CON M) 10 MEQ CR tablet Take 2 tablets (20 mEq) by mouth daily 90 tablet 1     Pseudoephedrine-DM-GG (ROBITUSSIN CF PO) Take 5 mLs by mouth 4 times daily as needed        Selenium Sulfide 2.25 % SHAM Externally apply 1 Application topically twice a week       sodium chloride ([employer]S SALINE NASAL SPRAY) 0.65 % nasal spray Spray 2 sprays into both nostrils 2 times daily       traZODone (DESYREL) 50 MG tablet Take 1 tablet (50 mg) by mouth At Bedtime 30 tablet 1     VITAMIN D, CHOLECALCIFEROL, PO Take 2,000 Units by mouth daily       Medications reviewed:  Medications reconciled to facility chart and changes were made to reflect current medications as identified as above med list. Below are the changes that were made:   Medications stopped since last EPIC medication reconciliation:   There are no discontinued medications.    Medications started since last [facility] medication reconciliation:  No orders of the defined types were placed in this encounter.    Patient Active Problem List   Diagnosis     GERD (gastroesophageal reflux disease)     Systolic murmur     CARDIOVASCULAR SCREENING; LDL GOAL LESS THAN 100     Osteoporosis     Peripheral neuropathy     Anxiety     Vitamin D deficiency     Mixed incontinence urge and stress     Health Care Home     Frequent UTI, Dr Bigg Muhammad, Urologist     Candidiasis of skin     Dependent edema     Benzodiazepine dependence, pt reports daily use of valium since the 1930's.     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     Chronic low back pain with bilateral sciatica  "    Macular degeneration (senile) of retina     Recurrent major depressive disorder, in partial remission (H)     Seborrheic dermatitis     Morbid obesity (H)     BMI 35.0-35.9,adult     Leg skin lesion, left     Chronic atrial fibrillation (H)     Reactive airway disease without complication     Mild cognitive impairment with memory loss     JAMES (obstructive sleep apnea)     CHF (congestive heart failure), NYHA class III, unspecified failure chronicity, unspecified type (H)     JAYME (generalized anxiety disorder)     Carbon dioxide retention     H/O breast implant, elective bilateral     REVIEW OF SYSTEMS:  Limited secondary to cognitive impairment but today pt reports that she is  Doing OK    Physical Exam:  /72  Pulse 95  Temp 97.9  F (36.6  C)  Resp 18  Ht 5' 6\" (1.676 m)  Wt 227 lb (103 kg)  SpO2 91%  BMI 36.64 kg/m2  GENERAL APPEARANCE: Alert, overweight female seen. Skin warm and dry.  Pink facial color.   Able to  express self verbally. Talkative with flat and sad affect..  HEAD:  Normocephalic.  No facial asymmetry.  NECK:  Thick neck,  Trachea midline.  No palpable lymphadenopathy..  ENT: Mouth and posterior oropharynx normal, moist mucous membranes, .  EYES: EOM normal, conjunctiva and lids normal.   RESP:  Diminished lung sounds bilateral lower lobes.  No wheezing or rhonchi.  Old surgical scar from thyroidectomy visible at base of neck and upper chest.. Oxygen on at 1 l/min via NC.  No cough during visit..  CV: Irregularly, irregular rate and rhythm, 2/6 systolic murmur. +3/+4 pitting leg (to hips),ankle and pedal edema.      ABDOMEN: Large, rounded abdomen. BS's positive all 4 quadrants. No discomfort with palpation of abdomen including suprapubic area.  MUSC:  No pain with gentle ROM of upper extremities.  Mild discomfort with flexion & extension of knees.    NEURO:  Alert.  Purposeful movement of extremities without focal weakness, although legs are weaker than arms due to deconditioning. " No tremors.  PSYCH:  Discussed current status.  She is in agreement to have another dose of Zaroxolyn on Saturday with labs on Monday and to increase her KCL       Recent Labs:   CBC RESULTS:   Recent Labs   Lab Test 10/23/18 09/27/18 07/10/18  09/21/13   1455  06/01/11   1845   WBC  6.6   --   5.6  10.9   --    RBC  4.43   --   4.62  4.77   --    HGB  13.0  12.6  12.9  14.9  15.0   HCT  43.2   --   43.7  43.1  49.2   MCV  97.5   --   94.6  90  94.1   MCH   --    --    --   31.2  28.7   MCHC   --    --    --   34.6  30.5*   RDW  16.4*   --   15.9*  12.7  13.7   PLT  218   --   202  128*   --        Last Basic Metabolic Panel:  10/25/18:  BUN: 15, creatinine: 0.57, est GFR >60  Na: 137, K: 3.5, Chl: 86, CO2: 39, glucose: 114    Recent Labs   Lab Test 10/23/18 09/27/18   NA  134*  139   POTASSIUM  4.4  4.1   CHLORIDE  88*  88*   KURTIS  10.5*  10.2   CO2  33*  40*   BUN  17  14   CR  0.64  0.65   GLC  116*  94     GFR Estimate   Date Value Ref Range Status   10/25/2018 >60 >60 ml/min/1.73m2 Final   10/23/2018 >60 >60 mL/min/1.73m2 Final   09/27/2018 >60 >60 mL/min/1.73m2 Final   07/23/2018 >60 ml/min/1.73m2 Final   07/16/2018 >60 >60 mL/min/1.73m2 Final       Lab Results   Component Value Date    A1C 6.3 09/27/2018    A1C 7.3 06/11/2018     BNP:    10/23: 1031  10/25: 989    Assessment/Plan:  (I50.9) CHF (congestive heart failure), NYHA class III, unspecified failure chronicity, unspecified type (H)  (primary encounter diagnosis)  Comment: Ongoing exacerbation.  Plan: Zaroxolyn 5 mg on Saturday X1 (1/2 hour before Lasix).  Increase KCL to 20 meq bid with food.  BMP and BNP on Monday.  Dr. Leon to see on Monday.  Continue weights three times per week.  Comfort is goal of tx and pt does not desire hospitalization.    (I48.2) Chronic atrial fibrillation (H)  Comment: Chronic, rate controlled  Plan: Continue current POC.    (E11.9) Type 2 diabetes mellitus without complication, without long-term current use of insulin  (H)  Comment: Chronic, tolerating decrease of metformin well. HgbA1C goal <9%  Plan: Continue current dose of metformin and current schedule of monitoring. HgbA1C q3m.    (J45.40) Moderate persistent reactive airway disease without complication  Comment:Chronic requiring oxygen   Plan: Continue current POC, but limit O2 flow rate to <2l/min due to CO2 retention.    (G31.84) Mild cognitive impairment with memory loss  Comment: Ongoing and variable  Plan: Continue to monitor.    Total time spent with patient visit was 37 min including patient visit, review of past records and discussion with nsg staff. Greater than 50% of total time spent with counseling and coordinating care due to CHF..     Electronically signed by  SABINE Borjas CNP                   Statement Selected

## 2021-05-31 ENCOUNTER — RECORDS - HEALTHEAST (OUTPATIENT)
Dept: ADMINISTRATIVE | Facility: CLINIC | Age: 86
End: 2021-05-31

## 2021-06-01 ENCOUNTER — RECORDS - HEALTHEAST (OUTPATIENT)
Dept: ADMINISTRATIVE | Facility: CLINIC | Age: 86
End: 2021-06-01

## 2024-04-12 NOTE — PROGRESS NOTES
"  [facility]    Chief Complaint   Patient presents with     senior living Regulatory     HPI:    Mireille Taylor is a 88 year old  (8/2/1929), who is being seen today for a federally mandated E/M visit at [facility].  HPI information obtained from: facility chart records, facility staff, patient report and [facility] chart review. Today's concerns are:  Type 2 diabetes mellitus without complication, without long-term current use of insulin (H)  Fasting accuchecks in March:  154-182.  HgbA1C in February was 6.9%    Postsurgical hypothyroidism, Surgical excision in her 20's.  No recent changes to levothyroxine dose.  TSH in February was 2.94.    Recurrent major depressive disorder, in partial remission (H)  Remains on citalopram.  Pt reports increased anxiety at times as well as some increased sadness with advancing memory loss.  PHQ 9 score of 18 in February.    Chronic atrial fibrillation (H)  Remains on metoprolol for rate control and ASA for anticoagulation.  Pulse ranges in past month: 50-71.  No reports of chest pain.    Slow transit constipation  BM's qd-qod on current medications.    Benzodiazepine dependence, pt reports daily use of valium since the 1930's.  PRN valium dc'd in January.  Pt reports that she does consider at times the benefit to a prn dose to get herself \"off the ceiling\" when she becomes anxious.  Continues with HS valium.     Pain of right hand  Reported to [facility] staff yesterday that she had accidentally shut her hand in a bathroom door some time ago and continues to have pain in her hand.  No bruising or redness. XRays completed on 4/9 showed diffuse degenerative changes but no acute fracture, dislocation or aggressive osseous lesion    Mild cognitive impairment with memory loss  She admits to increasing memory issues. BIMS score of 10.  This is depressing for her, but primarily as she does not feel that her children understand that this will " Please see the attached refill request.   progress.    ALLERGIES: Bactrim [sulfamethoxazole w-trimethoprim]; Gabapentin; Lyrica; Methadone; Nsaids; Penicillin g; Tramadol; Cephalexin hcl; Clindamycin hcl; and Macrobid [nitrofurantoin]  PAST MEDICAL HISTORY:  has a past medical history of Advanced directives, counseling/discussion, POLST completed 8/27/15, DNR/DNI, No tube feedings, No dialysis,  Do not hospitalize unless comfort can be improved.  OK for IV's and antibiotics (8/27/2015); Agoraphobia without mention of panic attacks; Benzodiazepine dependence, pt reports daily use of valium since the 1930's. (8/27/2015); Chronic atrial fibrillation (H) (10/20/2017); Chronic low back pain with bilateral sciatica (6/3/2016); Constipation (8/27/2015); Degenerative disc disease; Dependent edema (8/27/2015); Depressive disorder, not elsewhere classified; Esophageal reflux; Hypothyroidism; Leg skin lesion, left (8/18/2017); Macular degeneration (senile) of retina (8/23/2016); Mild cognitive impairment with memory loss (4/10/2018); Nasal bleeding (1/28/2016); Obstructive sleep apnea (adult) (pediatric); Postsurgical hypothyroidism, Surgical excision in her 20's. (1/28/2016); Reactive airway disease without complication (11/6/2017); Recurrent major depressive disorder, in partial remission (H) (12/6/2016); Seborrheic dermatitis (4/12/2017); Squamous cell carcinoma of skin of upper limb, including shoulder; Type 2 diabetes mellitus without complication (H) (12/16/2015); Unspecified curvature of spine; Unspecified essential hypertension; and Unspecified vitamin D deficiency.  PAST SURGICAL HISTORY:  has a past surgical history that includes Abdomen surgery (1940); hip surgery (2010); Foot surgery (2000); Hysterectomy vaginal (1980'S); Breast surgery (1970'S); Eye surgery (2003); Abdomen surgery (1990's); Head and neck surgery (1960's); knee surgery (9/2004, 11/2007); implant back (4/7/2009); and Implant stimulator and leads sacral nerve (stage one and two) (2/16/2015   2005).  FAMILY HISTORY: family history includes Family History Negative in her son, son, son, and son; HEART DISEASE in her father and mother.  SOCIAL HISTORY:  reports that she quit smoking about 58 years ago. Her smoking use included Cigarettes. She has a 24.00 pack-year smoking history. She has never used smokeless tobacco. She reports that she uses illicit drugs. She reports that she does not drink alcohol.    MEDICATIONS:  Current Outpatient Prescriptions   Medication Sig Dispense Refill     OxyCODONE HCl (ROXICODONE PO) Take 5 mg by mouth 2 times daily Give at 0630 and 2100. And q6h prn       diazepam (VALIUM) 5 MG tablet 2.5 mg at HS 60 tablet      levalbuterol (XOPENEX) 0.63 MG/3ML neb solution Take 3 mLs (0.63 mg) by nebulization every 8 hours as needed for shortness of breath / dyspnea or wheezing And bid for two weeks. 360 mL      furosemide (LASIX) 20 MG tablet Take 2 tablets (40 mg) by mouth 2 times daily 90 tablet 4     potassium chloride SA (K-DUR/KLOR-CON M) 10 MEQ CR tablet Take 2 tablets (20 mEq) by mouth daily 90 tablet 1     metoprolol (LOPRESSOR) 25 MG tablet Take 0.5 tablets (12.5 mg) by mouth 2 times daily Hold for systolic BP <100 or pulse <60. 90 tablet 3     chlorhexidine (PERIDEX) 0.12 % solution Swish and spit in mouth 2 times daily Rinse with 1/2 oz BID for 30 secs and expectorate       estradiol (ESTRACE VAGINAL) 0.1 MG/GM cream Place 2 g vaginally three times a week 42.5 g      Cranberry (CRANBERRY CONCENTRATE) 500 MG CAPS Take 1 capsule (500 mg) by mouth 2 times daily 90 capsule      VITAMIN D, CHOLECALCIFEROL, PO Take 2,000 Units by mouth daily       Selenium Sulfide 2.25 % SHAM Externally apply 1 Application topically twice a week       Multiple Vitamins-Minerals (PRESERVISION AREDS 2 PO) Take 1 tablet by mouth 2 times daily       Pseudoephedrine-DM-GG (ROBITUSSIN CF PO) Take 5 mLs by mouth 4 times daily as needed        Acetaminophen (TYLENOL PO) Take 650 mg by mouth 4 times daily        polyethylene glycol (MIRALAX/GLYCOLAX) powder Take 17 g by mouth daily as needed for constipation 119 g      metFORMIN (GLUCOPHAGE) 500 MG tablet Take 1 tablet (500 mg) by mouth 2 times daily (with meals) 180 tablet 1     pantoprazole (PROTONIX) 40 MG enteric coated tablet Take 1 tablet (40 mg) by mouth daily Take 30-60 minutes before a meal. 180 tablet 3     levothyroxine (SYNTHROID, LEVOTHROID) 125 MCG tablet Take 1 tablet (125 mcg) by mouth daily 90 tablet 1     sodium chloride (CVS SALINE NASAL SPRAY) 0.65 % nasal spray Spray 2 sprays into both nostrils 2 times daily       aspirin 81 MG EC tablet Take 1 tablet (81 mg) by mouth daily 90 tablet 3     Ascorbic Acid 1000 MG TABS Take 1,000 mg by mouth daily       citalopram (CELEXA) 40 MG tablet TAKE 1 TABLET BY MOUTH DAILY 90 tablet 1     methenamine hippurate (HIPREX) 1 G TABS TAKE 1 TABLET BY MOUTH ONCE A DAY.  TAKE ALONG WITH 1000MG VITAMIN-C 180 tablet 4     mirabegron (MYRBETRIQ) 50 MG 24 hr tablet Take 1 tablet (50 mg) by mouth daily 30 tablet 11     Medications reviewed:  Medications reconciled to facility chart and changes were made to reflect current medications as identified as above med list. Below are the changes that were made:   Medications stopped since last EPIC medication reconciliation:   There are no discontinued medications.    Medications started since last [facility] medication reconciliation:  No orders of the defined types were placed in this encounter.    Patient Active Problem List   Diagnosis     GERD (gastroesophageal reflux disease)     Systolic murmur     CARDIOVASCULAR SCREENING; LDL GOAL LESS THAN 100     JAMES on CPAP     Osteoporosis     Peripheral neuropathy     Anxiety     Vitamin D deficiency     Mixed incontinence urge and stress     Health Care Home     Frequent UTI, Dr Bigg Muhammad, Urologist     Candidiasis of skin     Dependent edema     Benzodiazepine dependence, pt reports daily use of valium since the 1930's.     Advance  "Care Planning     Constipation     Type 2 diabetes mellitus without complication (H)     Postsurgical hypothyroidism, Surgical excision in her 20's.     Chronic low back pain with bilateral sciatica     Macular degeneration (senile) of retina     Recurrent major depressive disorder, in partial remission (H)     Seborrheic dermatitis     Morbid obesity (H)     BMI 35.0-35.9,adult     Leg skin lesion, left     Chronic atrial fibrillation (H)     Reactive airway disease without complication     Mild cognitive impairment with memory loss     Case Management:  I have reviewed the care plan and MDS and do agree with the plan. Patient's desire to return to the community is present, but is not able due to care needs .  Information reviewed:  Medications, vital signs, orders, and nursing notes.    ROS:  Negative selected, CONSTITUTIONAL: weight loss, weight gain, poor appetite and fevers, EYES: tearing, redness and Positive for eyeglasses and macular degeneration.  Followed by Dr. Carlson ENT: [place], dysphagia and positive for h/o epistaxis,  CV: chest pain and Positive for dependent edema and now weight gain with irregular heartrate.., RESPIRATORY: , Positive for occasional cough.  No SOB., : Positive for frequent UTI's and dribble incontinence. Has implanted stimulator for bladder control.... GI: N&V.  Positive for occasional loose stools and constipation.  H/o occ rectal bleeding.. NEURO: headaches and Positive for variable memory issues - son reports it is dependent on how much valium and oxycodone she has taken & pt agrees and MUSCULOSKELETAL: Positive for chronic back pain and right hand pain .    Exam:  Vitals: /71  Pulse 50  Temp 97.8  F (36.6  C)  Ht 5' 6\" (1.676 m)  Wt 240 lb (108.9 kg)  BMI 38.74 kg/m2  BMI= Body mass index is 38.74 kg/(m^2).  GENERAL APPEARANCE: Alert, overweight female seen.  Up in custom scooter.  Well groomed. .  Able to  express self verbally. Pale facial color. Some forgetfulness " of past details.   HEAD:  Normocephalic.  No facial asymmetry..  ENT: Mouth and posterior oropharynx normal, moist mucous membranes, .  EYES: EOM normal, conjunctiva and lids normal.   RESP:  No cough during visit.   Diminished lung sounds bilateral lower lobes, with crackles at bases (per baseline), but no wheezes or rhonchi today.  Old surgical scar from thyroidectomy visible at base of neck and upper chest..  CV: Irregularly, irregular rate and rhythm, 2/6 systolic murmur. +2 ankle and pedal edema. DP pulses +1 bilateral and MAHAMED stocking put back on.  No pedal wounds._  ABDOMEN: Large, rounded abdomen. BS's positive all 4 quadrants. No discomfort with palpation of abdomen including suprapubic area.  MUSC:  NO pain with palpation of spinal process or with ROM of lower extremities and hips. Some pain with palpation of top of right hand, but no bruising or redness.  Has some difficulty making a fist due to discomfort,.  NEURO:  Alert and oriented X3.  Purposeful movement of extremities without focal weakness, although legs are weaker than arms due to deconditioning. No tremors or cogwheeling.  Strong hand grasp bilaterally.  PSYCH:  Lengthy discussion regarding current condition and mood.  Discussed possibility of adding Remeron for depression and anxiety and she is interested in trying this.  She will monitor and update me on effects.  Denies any suicidal thoughts.    Lab/Diagnostic data:   CBC RESULTS:   Recent Labs   Lab Test 02/05/18 12/21/17  10/19/17 10/05/17   09/21/13   1455   06/01/11   1845   WBC   --    --    --   8.8  9.3   < >  10.9   --    --    RBC   --    --    --   4.35  4.35   < >  4.77   --    --    HGB  14.5  13.9   < >  13.3  13.2   < >  14.9   --   15.0   HCT   --    --    --   42.6  42.1   < >  43.1   --   49.2   MCV   --    --    --   97.9  96.8   < >  90   --   94.1   MCH   --    --    --    --    --    --   31.2   --   28.7   MCHC   --    --    --    --    --    --   34.6   --   30.5*   RDW    --    --    --   13.1  13.4   < >  12.7   --   13.7   PLT   --    --    --   201  153   < >  128*   < >   --     < > = values in this interval not displayed.       Last Basic Metabolic Panel:  Recent Labs   Lab Test 02/05/18 12/21/17 11/21/17   NA  139  141  139   POTASSIUM  4.3  4.2  4.4   CHLORIDE  98  97*  98   KURTIS  10.1   --   10.0   CO2  31  35*  32*   BUN  20   --   16   CR  0.76   --   0.81   GLC  143*   --   213*     GFR Estimate   Date Value Ref Range Status   02/05/2018 >60 >60 mL/min/1.73m2 Final   11/21/2017 >60 >60 ml/min/1.73m2 Final   11/09/2017 59 (L) >60 ml/min/1.73m2 Final   10/26/2017 >60 >60 mL/min/1.73m2 Final   10/23/2017 >60 >60 ml/min/1.73m2 Final       TSH   Date Value Ref Range Status   02/05/2018 2.94 0.30 - 4.50 uIU/mL Final   02/02/2017 0.60 0.20 - 4.50 mcU/mL Final   02/02/2017 0.60 0.20 - 4.50 ulU/ml Final       Lab Results   Component Value Date    A1C 6.9 02/05/2018    A1C 6.0 08/21/2017       ASSESSMENT/PLAN  (E11.9) Type 2 diabetes mellitus without complication, without long-term current use of insulin (H)  (primary encounter diagnosis)  Comment: Chronic, HgbA1C goal <8%, at goal  Plan: Continue current POC.    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  Comment: Chronic, controlled with levothyroxine  Plan: Continue current dose of levothyroxine and check TSH annually.    (F33.41) Recurrent major depressive disorder, in partial remission (H)  Comment: Ongoing  Plan: Will start Remeron 7.5 mg at HS and monitor for effect.  Will have in house psychologist see pt. Continue citalopram.    (I48.2) Chronic atrial fibrillation (H)  Comment: Chronic  Plan: Continue metoprolol and ASA.  Monitor.    (K59.01) Slow transit constipation  Comment: Chronic but controlled  Plan: Continue POC.    (F13.20) Benzodiazepine dependence, pt reports daily use of valium since the 1930's.  Comment: Chronic  Plan: Continue to support pt without adding more benzodiazepine.  Will  monitor for decreased anxiety with Remeron    (M79.641) Pain of right hand  Comment: Soft tissue injury  Plan: Monitor.     (G31.84) Mild cognitive impairment with memory loss  Comment: Chronic  Plan: Monitor for expected advancement of memory loss.     Electronically signed by:  SABINE Borjas CNP

## 2025-04-02 NOTE — PROGRESS NOTES
Blood thinner approval form faxed to Dr Bonner at [phone].   [facility]    Chief Complaint   Patient presents with     RECHECK     HPI:    Mireille Taylor is a 87 year old  (8/2/1929), who is being seen today for an episodic care visit at [facility]. Today's concern is:  Mixed incontinence urge and stress  Long standing history.  Pt reports that she is upset that she has increasing amounts of large amts of incontinence of urine.  No increase in burning.  No fever.  Followed by Urology Dr. Vera.  She has been having UTI's for 60 years and reports that the symptoms she is having are not similar to those of a UTI.  Remains on Hiprex, Myrbetriq and premarin cream 2X per week.    Chronic UTI  Remains on Hiprex.  Last UTI was one year ago.    Bladder spasms  Improved since premarin cream started, but still occur 1-2 times a month.  When they occur, they last about 1/2 hours which correlates when she takes her pain pill      ALLERGIES: Bactrim [sulfamethoxazole w-trimethoprim]; Gabapentin; Lyrica; Methadone; Nsaids; Penicillin g; Tramadol; Cephalexin hcl; Clindamycin hcl; and Macrobid [nitrofurantoin]  Past Medical, Surgical, Family and Social History reviewed and updated in [email or web address].    Current Outpatient Prescriptions   Medication Sig Dispense Refill     DiazePAM (VALIUM PO) Take 2.5 mg by mouth At Bedtime       Multiple Vitamins-Minerals (PRESERVISION AREDS 2 PO) Take 1 tablet by mouth 2 times daily       Pseudoephedrine-DM-GG (ROBITUSSIN CF PO) Take 5 mLs by mouth as needed       Acetaminophen (TYLENOL PO) Take 650 mg by mouth 4 times daily       conjugated estrogens (PREMARIN) vaginal cream Place 0.5 g vaginally twice a week 30 g 12     polyethylene glycol (MIRALAX/GLYCOLAX) powder Take 17 g by mouth daily as needed for constipation 119 g      oxyCODONE (ROXICODONE) 5 MG immediate release tablet 2.5 mg am and HS and q6h prn pain.  Check with pt before administering morning dose if she has had a prn overnight to see if she would like to  hold the morning dose.  0     metFORMIN (GLUCOPHAGE) 500 MG tablet Take 1 tablet (500 mg) by mouth 2 times daily (with meals) 180 tablet 1     pantoprazole (PROTONIX) 40 MG enteric coated tablet Take 1 tablet (40 mg) by mouth daily Take 30-60 minutes before a meal. 180 tablet 3     levothyroxine (SYNTHROID, LEVOTHROID) 125 MCG tablet Take 1 tablet (125 mcg) by mouth daily 90 tablet 1     sodium chloride (CVS SALINE NASAL SPRAY) 0.65 % nasal spray Spray 2 sprays into both nostrils 2 times daily       aspirin 81 MG EC tablet Take 1 tablet (81 mg) by mouth daily 90 tablet 3     Ascorbic Acid 1000 MG TABS Take 1,000 mg by mouth daily       furosemide (LASIX) 20 MG tablet Take 1 tablet (20 mg) by mouth daily 90 tablet 4     citalopram (CELEXA) 40 MG tablet TAKE 1 TABLET BY MOUTH DAILY 90 tablet 1     potassium chloride SA (K-DUR,KLOR-CON M) 10 MEQ tablet TAKE ONE TABLET BY MOUTH DAILY 90 tablet 1     methenamine hippurate (HIPREX) 1 G TABS TAKE 1 TABLET BY MOUTH ONCE A DAY.  TAKE ALONG WITH 1000MG VITAMIN-C 180 tablet 4     mirabegron (MYRBETRIQ) 50 MG 24 hr tablet Take 1 tablet (50 mg) by mouth daily 30 tablet 11     Medications reviewed:  Medications reconciled to facility chart and changes were made to reflect current medications as identified as above med list. Below are the changes that were made:   Medications stopped since last EPIC medication reconciliation:   There are no discontinued medications.    Medications started since last [facility] medication reconciliation:  No orders of the defined types were placed in this encounter.    Patient Active Problem List   Diagnosis     GERD (gastroesophageal reflux disease)     Systolic murmur     CARDIOVASCULAR SCREENING; LDL GOAL LESS THAN 100     JAMES on CPAP     Osteoporosis     BMI 30-35     Peripheral neuropathy (H)     Anxiety     Vitamin D deficiency     Mixed incontinence urge and stress     Health Care Home     Frequent UTI, Dr Bigg Muhammad, Urologist     Candidiasis  "of skin     Dependent edema     Benzodiazepine dependence, pt reports daily use of valium since the 1930's.     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     Chronic low back pain with bilateral sciatica     Macular degeneration (senile) of retina     Recurrent major depressive disorder, in partial remission (H)       REVIEW OF SYSTEMS:  Negative selected, CONSTITUTIONAL: weight loss, weight gain, poor appetite and fevers, EYES: tearing, redness and Positive for eyeglasses and macular degeneration.  Followed by Dr. Carlson ENT: [place], dysphagia and positive for h/o epistaxis,  CV: chest pain and Positive for dependent edema., RESPIRATORY: shortness of breath, cough and wheezing, : Positive for frequent UTI's and dribble incontinence. Has implanted stimulator for bladder control...SEE HPI. GI: N&V.  Positive for occasional loose stools and constipation.  See HPI for rectal bleeding.. NEURO: headaches and Positive for variable memory issues - son reports it is dependent on how much valium and oxycodone she has taken & pt agrees.. , and MUSCULOSKELETAL: Positive for chronic back pain.    Physical Exam:  /66  Pulse 63  Temp 98.2  F (36.8  C)  Resp 20  Ht 5' 6\" (1.676 m)  Wt 212 lb (96.2 kg)  BMI 34.22 kg/m2  GENERAL APPEARANCE: Alert, overweight female seen.  Sitting up in w/c.  Well groomed. .  Able to  express self verbally. No obvious acute distress.  HEAD:  Normocephalic.  No facial asymmetry..  ENT: Mouth and posterior oropharynx normal, moist mucous membranes, .  EYES: EOM normal, conjunctiva and lids normal  RESP: Quiet, effortless respiration.  No cough.  Diminished lung sounds bilateral lower lobes, otherwise CTA bilaterally.  Old surgical scar from thyroidectomy visible at base of neck and upper chest..  CV: regular rate and rhythm, 2/6 systolic murmur. Trace/+1 ankle and pedal " edema.  ABDOMEN: Large, rounded abdomen. BS's positive all 4 quadrants. No discomfort with palpation of abdomen including suprapubic area. .  PSYCH: oriented X 3, Mood is somewhat flat per baseline, but overall positive.     Recent Labs:    CBC RESULTS:   Recent Labs   Lab Test 12/05/16 09/27/16 09/21/13   1455  06/01/11   1845   WBC  5.2  5.8   < >  10.9   --    RBC  4.23  4.3   < >  4.77   --    HGB  13.1  13.3   < >  14.9  15.0   HCT  40.6  41.2   < >  43.1  49.2   MCV  96.0  95.8   < >  90  94.1   MCH   --    --    --   31.2  28.7   MCHC   --    --    --   34.6  30.5*   RDW  13.3  13.0   < >  12.7  13.7   PLT  175  179   < >  128*   --     < > = values in this interval not displayed.       Last Basic Metabolic Panel:  Recent Labs   Lab Test 02/02/17 12/05/16 09/27/16   NA  142  142  142  139   POTASSIUM  4.5  4.5  4.3  3.8   CHLORIDE  104  104  103  100   KURTIS   --   10.0  10.1   CO2  31  30  31   BUN   --   11  <10   CR   --   0.62  0.72   GLC   --   110*  203*     GFR Estimate   Date Value Ref Range Status   12/05/2016 >60 ml/min/1.73m2 Final   09/27/2016 >60 >60 ml/min/1.73m2 Final   08/25/2016 >60 ml/min/1.73m2 Final   06/09/2016 >60 ml/min/1.73m2 Final   03/17/2016 >60 ml/min/1.73m2 Final       TSH   Date Value Ref Range Status   02/02/2017 0.60 0.20 - 4.50 mcU/mL Final   02/02/2017 0.60 0.20 - 4.50 ulU/ml Final     Assessment/Plan:  (N39.46) Mixed incontinence urge and stress  (primary encounter diagnosis)  Comment: Ongoing  Plan: Pt requesting to see Urology for evaluation to determine if their are new strategies for her incontinence.    (N39.0) Chronic UTI  Comment: No evidence for reoccurence  Plan: Continue Hiprex    (N32.89) Bladder spasms  Comment: Chronic, but improved with premarin cream  Plan: Continue POC.    Electronically signed by  SABINE Borjas CNP